# Patient Record
Sex: MALE | Race: WHITE | NOT HISPANIC OR LATINO | Employment: FULL TIME | URBAN - METROPOLITAN AREA
[De-identification: names, ages, dates, MRNs, and addresses within clinical notes are randomized per-mention and may not be internally consistent; named-entity substitution may affect disease eponyms.]

---

## 2017-04-14 ENCOUNTER — ALLSCRIPTS OFFICE VISIT (OUTPATIENT)
Dept: OTHER | Facility: OTHER | Age: 47
End: 2017-04-14

## 2017-04-17 DIAGNOSIS — J30.9 ALLERGIC RHINITIS: ICD-10-CM

## 2017-04-17 DIAGNOSIS — S29.019A STRAIN OF MUSCLE AND TENDON OF UNSPECIFIED WALL OF THORAX, INITIAL ENCOUNTER: ICD-10-CM

## 2017-04-17 DIAGNOSIS — M79.671 PAIN OF RIGHT FOOT: ICD-10-CM

## 2017-04-17 DIAGNOSIS — R07.9 CHEST PAIN: ICD-10-CM

## 2017-04-17 DIAGNOSIS — D47.3 ESSENTIAL HEMORRHAGIC THROMBOCYTHEMIA (HCC): ICD-10-CM

## 2017-04-17 DIAGNOSIS — K21.9 GASTRO-ESOPHAGEAL REFLUX DISEASE WITHOUT ESOPHAGITIS: ICD-10-CM

## 2017-04-17 DIAGNOSIS — M79.10 MYALGIA: ICD-10-CM

## 2017-04-17 DIAGNOSIS — K22.70 BARRETT'S ESOPHAGUS WITHOUT DYSPLASIA: ICD-10-CM

## 2017-04-17 DIAGNOSIS — E78.5 HYPERLIPIDEMIA: ICD-10-CM

## 2017-04-17 DIAGNOSIS — B00.9 HERPESVIRAL INFECTION: ICD-10-CM

## 2017-04-20 ENCOUNTER — TRANSCRIBE ORDERS (OUTPATIENT)
Dept: LAB | Facility: CLINIC | Age: 47
End: 2017-04-20

## 2017-04-20 ENCOUNTER — APPOINTMENT (OUTPATIENT)
Dept: LAB | Facility: CLINIC | Age: 47
End: 2017-04-20
Payer: COMMERCIAL

## 2017-04-20 DIAGNOSIS — E78.5 HYPERLIPIDEMIA: ICD-10-CM

## 2017-04-20 DIAGNOSIS — S29.019A STRAIN OF MUSCLE AND TENDON OF UNSPECIFIED WALL OF THORAX, INITIAL ENCOUNTER: ICD-10-CM

## 2017-04-20 DIAGNOSIS — D47.3 ESSENTIAL HEMORRHAGIC THROMBOCYTHEMIA (HCC): ICD-10-CM

## 2017-04-20 DIAGNOSIS — K22.70 BARRETT'S ESOPHAGUS WITHOUT DYSPLASIA: ICD-10-CM

## 2017-04-20 DIAGNOSIS — B00.9 HERPESVIRAL INFECTION: ICD-10-CM

## 2017-04-20 DIAGNOSIS — J30.9 ALLERGIC RHINITIS: ICD-10-CM

## 2017-04-20 DIAGNOSIS — K21.9 GASTRO-ESOPHAGEAL REFLUX DISEASE WITHOUT ESOPHAGITIS: ICD-10-CM

## 2017-04-20 LAB
ALBUMIN SERPL BCP-MCNC: 3.7 G/DL (ref 3.5–5)
ALP SERPL-CCNC: 87 U/L (ref 46–116)
ALT SERPL W P-5'-P-CCNC: 35 U/L (ref 12–78)
ANION GAP SERPL CALCULATED.3IONS-SCNC: 8 MMOL/L (ref 4–13)
AST SERPL W P-5'-P-CCNC: 21 U/L (ref 5–45)
BILIRUB SERPL-MCNC: 0.7 MG/DL (ref 0.2–1)
BILIRUB UR QL STRIP: NEGATIVE
BUN SERPL-MCNC: 12 MG/DL (ref 5–25)
CALCIUM SERPL-MCNC: 8.9 MG/DL (ref 8.3–10.1)
CHLORIDE SERPL-SCNC: 104 MMOL/L (ref 100–108)
CHOLEST SERPL-MCNC: 260 MG/DL (ref 50–200)
CLARITY UR: CLEAR
CO2 SERPL-SCNC: 27 MMOL/L (ref 21–32)
COLOR UR: YELLOW
CREAT SERPL-MCNC: 1.05 MG/DL (ref 0.6–1.3)
ERYTHROCYTE [DISTWIDTH] IN BLOOD BY AUTOMATED COUNT: 12.1 % (ref 11.6–15.1)
GFR SERPL CREATININE-BSD FRML MDRD: >60 ML/MIN/1.73SQ M
GLUCOSE P FAST SERPL-MCNC: 95 MG/DL (ref 65–99)
GLUCOSE UR STRIP-MCNC: NEGATIVE MG/DL
HCT VFR BLD AUTO: 43 % (ref 36.5–49.3)
HDLC SERPL-MCNC: 51 MG/DL (ref 40–60)
HGB BLD-MCNC: 14.7 G/DL (ref 12–17)
HGB UR QL STRIP.AUTO: NEGATIVE
KETONES UR STRIP-MCNC: NEGATIVE MG/DL
LDLC SERPL CALC-MCNC: 176 MG/DL (ref 0–100)
LEUKOCYTE ESTERASE UR QL STRIP: NEGATIVE
MCH RBC QN AUTO: 33.9 PG (ref 26.8–34.3)
MCHC RBC AUTO-ENTMCNC: 34.2 G/DL (ref 31.4–37.4)
MCV RBC AUTO: 99 FL (ref 82–98)
NITRITE UR QL STRIP: NEGATIVE
PH UR STRIP.AUTO: 6.5 [PH] (ref 4.5–8)
PLATELET # BLD AUTO: 438 THOUSANDS/UL (ref 149–390)
PMV BLD AUTO: 10.5 FL (ref 8.9–12.7)
POTASSIUM SERPL-SCNC: 3.7 MMOL/L (ref 3.5–5.3)
PROT SERPL-MCNC: 7.6 G/DL (ref 6.4–8.2)
PROT UR STRIP-MCNC: NEGATIVE MG/DL
RBC # BLD AUTO: 4.34 MILLION/UL (ref 3.88–5.62)
SODIUM SERPL-SCNC: 139 MMOL/L (ref 136–145)
SP GR UR STRIP.AUTO: 1.02 (ref 1–1.03)
TRIGL SERPL-MCNC: 163 MG/DL
TSH SERPL DL<=0.05 MIU/L-ACNC: 2.02 UIU/ML (ref 0.36–3.74)
UROBILINOGEN UR QL STRIP.AUTO: 0.2 E.U./DL
WBC # BLD AUTO: 7.08 THOUSAND/UL (ref 4.31–10.16)

## 2017-04-20 PROCEDURE — 85027 COMPLETE CBC AUTOMATED: CPT

## 2017-04-20 PROCEDURE — 80061 LIPID PANEL: CPT

## 2017-04-20 PROCEDURE — 84443 ASSAY THYROID STIM HORMONE: CPT

## 2017-04-20 PROCEDURE — 81003 URINALYSIS AUTO W/O SCOPE: CPT

## 2017-04-20 PROCEDURE — 80053 COMPREHEN METABOLIC PANEL: CPT

## 2017-04-20 PROCEDURE — 36415 COLL VENOUS BLD VENIPUNCTURE: CPT

## 2017-05-05 ENCOUNTER — APPOINTMENT (OUTPATIENT)
Dept: LAB | Facility: CLINIC | Age: 47
End: 2017-05-05
Payer: COMMERCIAL

## 2017-05-05 ENCOUNTER — ALLSCRIPTS OFFICE VISIT (OUTPATIENT)
Dept: OTHER | Facility: OTHER | Age: 47
End: 2017-05-05

## 2017-05-05 DIAGNOSIS — M79.10 MYALGIA: ICD-10-CM

## 2017-05-05 DIAGNOSIS — S29.019A STRAIN OF MUSCLE AND TENDON OF UNSPECIFIED WALL OF THORAX, INITIAL ENCOUNTER: ICD-10-CM

## 2017-05-05 DIAGNOSIS — M79.671 PAIN OF RIGHT FOOT: ICD-10-CM

## 2017-05-05 LAB
25(OH)D3 SERPL-MCNC: 13.2 NG/ML (ref 30–100)
ERYTHROCYTE [SEDIMENTATION RATE] IN BLOOD: 7 MM/HOUR (ref 0–10)
URATE SERPL-MCNC: 7.6 MG/DL (ref 4.2–8)

## 2017-05-05 PROCEDURE — 86431 RHEUMATOID FACTOR QUANT: CPT

## 2017-05-05 PROCEDURE — 86430 RHEUMATOID FACTOR TEST QUAL: CPT

## 2017-05-05 PROCEDURE — 82306 VITAMIN D 25 HYDROXY: CPT

## 2017-05-05 PROCEDURE — 36415 COLL VENOUS BLD VENIPUNCTURE: CPT

## 2017-05-05 PROCEDURE — 86038 ANTINUCLEAR ANTIBODIES: CPT

## 2017-05-05 PROCEDURE — 84550 ASSAY OF BLOOD/URIC ACID: CPT

## 2017-05-05 PROCEDURE — 85652 RBC SED RATE AUTOMATED: CPT

## 2017-05-05 PROCEDURE — 86618 LYME DISEASE ANTIBODY: CPT

## 2017-05-06 ENCOUNTER — GENERIC CONVERSION - ENCOUNTER (OUTPATIENT)
Dept: OTHER | Facility: OTHER | Age: 47
End: 2017-05-06

## 2017-05-08 ENCOUNTER — GENERIC CONVERSION - ENCOUNTER (OUTPATIENT)
Dept: OTHER | Facility: OTHER | Age: 47
End: 2017-05-08

## 2017-05-08 LAB
B BURGDOR IGG SER IA-ACNC: 0.39
B BURGDOR IGM SER IA-ACNC: 0.25
CRYOGLOB RF SER-ACNC: ABNORMAL [IU]/ML
RHEUMATOID FACT SER QL LA: POSITIVE
RYE IGE QN: NEGATIVE

## 2017-05-11 ENCOUNTER — HOSPITAL ENCOUNTER (OUTPATIENT)
Dept: RADIOLOGY | Facility: MEDICAL CENTER | Age: 47
Discharge: HOME | End: 2017-05-11
Payer: COMMERCIAL

## 2017-05-11 ENCOUNTER — TRANSCRIBE ORDERS (OUTPATIENT)
Dept: ADMINISTRATIVE | Facility: HOSPITAL | Age: 47
End: 2017-05-11

## 2017-05-11 DIAGNOSIS — S29.019A STRAIN OF MUSCLE AND TENDON OF UNSPECIFIED WALL OF THORAX, INITIAL ENCOUNTER: ICD-10-CM

## 2017-05-11 DIAGNOSIS — M79.10 MYALGIA: ICD-10-CM

## 2017-05-11 DIAGNOSIS — M79.671 PAIN OF RIGHT FOOT: ICD-10-CM

## 2017-05-11 PROCEDURE — 72072 X-RAY EXAM THORAC SPINE 3VWS: CPT

## 2017-05-12 ENCOUNTER — HOSPITAL ENCOUNTER (OUTPATIENT)
Dept: RADIOLOGY | Facility: MEDICAL CENTER | Age: 47
Discharge: HOME | End: 2017-05-12
Payer: COMMERCIAL

## 2017-05-12 ENCOUNTER — ALLSCRIPTS OFFICE VISIT (OUTPATIENT)
Dept: OTHER | Facility: OTHER | Age: 47
End: 2017-05-12

## 2017-05-12 DIAGNOSIS — R07.9 CHEST PAIN: ICD-10-CM

## 2017-05-12 DIAGNOSIS — S29.019A STRAIN OF MUSCLE AND TENDON OF UNSPECIFIED WALL OF THORAX, INITIAL ENCOUNTER: ICD-10-CM

## 2017-05-12 PROCEDURE — 71101 X-RAY EXAM UNILAT RIBS/CHEST: CPT

## 2017-05-14 ENCOUNTER — GENERIC CONVERSION - ENCOUNTER (OUTPATIENT)
Dept: OTHER | Facility: OTHER | Age: 47
End: 2017-05-14

## 2017-05-29 ENCOUNTER — OFFICE VISIT (OUTPATIENT)
Dept: URGENT CARE | Facility: MEDICAL CENTER | Age: 47
End: 2017-05-29
Payer: COMMERCIAL

## 2017-05-29 ENCOUNTER — HOSPITAL ENCOUNTER (OUTPATIENT)
Dept: RADIOLOGY | Facility: MEDICAL CENTER | Age: 47
Discharge: HOME | End: 2017-05-29
Admitting: FAMILY MEDICINE
Payer: COMMERCIAL

## 2017-05-29 DIAGNOSIS — M79.10 MYALGIA: ICD-10-CM

## 2017-05-29 DIAGNOSIS — B00.9 HERPESVIRAL INFECTION: ICD-10-CM

## 2017-05-29 DIAGNOSIS — E55.9 VITAMIN D DEFICIENCY: ICD-10-CM

## 2017-05-29 DIAGNOSIS — M25.531 PAIN IN RIGHT WRIST: ICD-10-CM

## 2017-05-29 DIAGNOSIS — E78.5 HYPERLIPIDEMIA: ICD-10-CM

## 2017-05-29 DIAGNOSIS — S69.91XA WRIST INJURY, RIGHT, INITIAL ENCOUNTER: ICD-10-CM

## 2017-05-29 DIAGNOSIS — R53.83 OTHER FATIGUE: ICD-10-CM

## 2017-05-29 DIAGNOSIS — R76.8 OTHER SPECIFIED ABNORMAL IMMUNOLOGICAL FINDINGS IN SERUM: ICD-10-CM

## 2017-05-29 PROCEDURE — 99203 OFFICE O/P NEW LOW 30 MIN: CPT

## 2017-05-29 PROCEDURE — 73110 X-RAY EXAM OF WRIST: CPT

## 2017-05-29 PROCEDURE — 29260 STRAPPING OF ELBOW OR WRIST: CPT

## 2017-06-09 ENCOUNTER — ALLSCRIPTS OFFICE VISIT (OUTPATIENT)
Dept: OTHER | Facility: OTHER | Age: 47
End: 2017-06-09

## 2017-08-05 DIAGNOSIS — D47.3 ESSENTIAL HEMORRHAGIC THROMBOCYTHEMIA (HCC): ICD-10-CM

## 2017-08-05 DIAGNOSIS — K22.70 BARRETT'S ESOPHAGUS WITHOUT DYSPLASIA: ICD-10-CM

## 2017-08-05 DIAGNOSIS — R76.8 OTHER SPECIFIED ABNORMAL IMMUNOLOGICAL FINDINGS IN SERUM: ICD-10-CM

## 2017-08-05 DIAGNOSIS — R53.83 OTHER FATIGUE: ICD-10-CM

## 2017-08-05 DIAGNOSIS — Z77.21 CONTACT WITH AND (SUSPECTED) EXPOSURE TO POTENTIALLY HAZARDOUS BODY FLUIDS: ICD-10-CM

## 2017-08-05 DIAGNOSIS — K21.9 GASTRO-ESOPHAGEAL REFLUX DISEASE WITHOUT ESOPHAGITIS: ICD-10-CM

## 2017-08-05 DIAGNOSIS — G47.19 OTHER HYPERSOMNIA: ICD-10-CM

## 2017-08-05 DIAGNOSIS — R31.9 HEMATURIA: ICD-10-CM

## 2017-08-05 DIAGNOSIS — M79.671 PAIN OF RIGHT FOOT: ICD-10-CM

## 2017-08-05 DIAGNOSIS — M79.10 MYALGIA: ICD-10-CM

## 2017-08-05 DIAGNOSIS — E55.9 VITAMIN D DEFICIENCY: ICD-10-CM

## 2017-08-05 DIAGNOSIS — E78.5 HYPERLIPIDEMIA: ICD-10-CM

## 2017-08-05 DIAGNOSIS — S29.019A STRAIN OF MUSCLE AND TENDON OF UNSPECIFIED WALL OF THORAX, INITIAL ENCOUNTER: ICD-10-CM

## 2017-08-15 ENCOUNTER — ALLSCRIPTS OFFICE VISIT (OUTPATIENT)
Dept: OTHER | Facility: OTHER | Age: 47
End: 2017-08-15

## 2017-08-15 ENCOUNTER — TRANSCRIBE ORDERS (OUTPATIENT)
Dept: LAB | Facility: CLINIC | Age: 47
End: 2017-08-15

## 2017-08-15 ENCOUNTER — APPOINTMENT (OUTPATIENT)
Dept: LAB | Facility: CLINIC | Age: 47
End: 2017-08-15
Payer: COMMERCIAL

## 2017-08-15 DIAGNOSIS — G47.19 OTHER HYPERSOMNIA: ICD-10-CM

## 2017-08-15 DIAGNOSIS — D47.3 ESSENTIAL HEMORRHAGIC THROMBOCYTHEMIA (HCC): ICD-10-CM

## 2017-08-15 DIAGNOSIS — Z77.21 CONTACT WITH AND (SUSPECTED) EXPOSURE TO POTENTIALLY HAZARDOUS BODY FLUIDS: ICD-10-CM

## 2017-08-15 DIAGNOSIS — E55.9 VITAMIN D DEFICIENCY: ICD-10-CM

## 2017-08-15 DIAGNOSIS — R76.8 OTHER SPECIFIED ABNORMAL IMMUNOLOGICAL FINDINGS IN SERUM: ICD-10-CM

## 2017-08-15 DIAGNOSIS — R53.83 OTHER FATIGUE: ICD-10-CM

## 2017-08-15 DIAGNOSIS — Z77.21 PERSONAL HISTORY OF EXPOSURE TO POTENTIALLY HAZARDOUS BODY FLUIDS: Primary | ICD-10-CM

## 2017-08-15 DIAGNOSIS — M79.10 MYALGIA: ICD-10-CM

## 2017-08-15 DIAGNOSIS — E78.5 HYPERLIPIDEMIA: ICD-10-CM

## 2017-08-15 LAB
25(OH)D3 SERPL-MCNC: 32.8 NG/ML (ref 30–100)
ALBUMIN SERPL BCP-MCNC: 3.6 G/DL (ref 3.5–5)
ALP SERPL-CCNC: 99 U/L (ref 46–116)
ALT SERPL W P-5'-P-CCNC: 36 U/L (ref 12–78)
ANION GAP SERPL CALCULATED.3IONS-SCNC: 8 MMOL/L (ref 4–13)
AST SERPL W P-5'-P-CCNC: 20 U/L (ref 5–45)
BACTERIA UR QL AUTO: ABNORMAL /HPF
BILIRUB SERPL-MCNC: 0.41 MG/DL (ref 0.2–1)
BILIRUB UR QL STRIP: NEGATIVE
BUN SERPL-MCNC: 10 MG/DL (ref 5–25)
CALCIUM SERPL-MCNC: 9.1 MG/DL (ref 8.3–10.1)
CHLORIDE SERPL-SCNC: 106 MMOL/L (ref 100–108)
CK SERPL-CCNC: 74 U/L (ref 39–308)
CLARITY UR: CLEAR
CO2 SERPL-SCNC: 27 MMOL/L (ref 21–32)
COLOR UR: YELLOW
CREAT SERPL-MCNC: 0.95 MG/DL (ref 0.6–1.3)
ERYTHROCYTE [DISTWIDTH] IN BLOOD BY AUTOMATED COUNT: 12 % (ref 11.6–15.1)
GFR SERPL CREATININE-BSD FRML MDRD: 96 ML/MIN/1.73SQ M
GLUCOSE P FAST SERPL-MCNC: 143 MG/DL (ref 65–99)
GLUCOSE UR STRIP-MCNC: NEGATIVE MG/DL
HCT VFR BLD AUTO: 44.6 % (ref 36.5–49.3)
HGB BLD-MCNC: 15.6 G/DL (ref 12–17)
HGB UR QL STRIP.AUTO: ABNORMAL
HYALINE CASTS #/AREA URNS LPF: ABNORMAL /LPF
KETONES UR STRIP-MCNC: NEGATIVE MG/DL
LEUKOCYTE ESTERASE UR QL STRIP: NEGATIVE
MCH RBC QN AUTO: 33.5 PG (ref 26.8–34.3)
MCHC RBC AUTO-ENTMCNC: 35 G/DL (ref 31.4–37.4)
MCV RBC AUTO: 96 FL (ref 82–98)
NITRITE UR QL STRIP: NEGATIVE
NON-SQ EPI CELLS URNS QL MICRO: ABNORMAL /HPF
PH UR STRIP.AUTO: 6 [PH] (ref 4.5–8)
PLATELET # BLD AUTO: 469 THOUSANDS/UL (ref 149–390)
PMV BLD AUTO: 9.9 FL (ref 8.9–12.7)
POTASSIUM SERPL-SCNC: 4 MMOL/L (ref 3.5–5.3)
PROT SERPL-MCNC: 7.6 G/DL (ref 6.4–8.2)
PROT UR STRIP-MCNC: ABNORMAL MG/DL
RBC # BLD AUTO: 4.66 MILLION/UL (ref 3.88–5.62)
RBC #/AREA URNS AUTO: ABNORMAL /HPF
SODIUM SERPL-SCNC: 141 MMOL/L (ref 136–145)
SP GR UR STRIP.AUTO: 1.01 (ref 1–1.03)
TSH SERPL DL<=0.05 MIU/L-ACNC: 1.3 UIU/ML (ref 0.36–3.74)
UROBILINOGEN UR QL STRIP.AUTO: 0.2 E.U./DL
WBC # BLD AUTO: 5.34 THOUSAND/UL (ref 4.31–10.16)
WBC #/AREA URNS AUTO: ABNORMAL /HPF

## 2017-08-15 PROCEDURE — 86696 HERPES SIMPLEX TYPE 2 TEST: CPT

## 2017-08-15 PROCEDURE — 80074 ACUTE HEPATITIS PANEL: CPT

## 2017-08-15 PROCEDURE — 82550 ASSAY OF CK (CPK): CPT

## 2017-08-15 PROCEDURE — 84443 ASSAY THYROID STIM HORMONE: CPT

## 2017-08-15 PROCEDURE — 87591 N.GONORRHOEAE DNA AMP PROB: CPT

## 2017-08-15 PROCEDURE — 86592 SYPHILIS TEST NON-TREP QUAL: CPT

## 2017-08-15 PROCEDURE — 36415 COLL VENOUS BLD VENIPUNCTURE: CPT

## 2017-08-15 PROCEDURE — 80053 COMPREHEN METABOLIC PANEL: CPT

## 2017-08-15 PROCEDURE — 87389 HIV-1 AG W/HIV-1&-2 AB AG IA: CPT

## 2017-08-15 PROCEDURE — 81001 URINALYSIS AUTO W/SCOPE: CPT

## 2017-08-15 PROCEDURE — 86695 HERPES SIMPLEX TYPE 1 TEST: CPT

## 2017-08-15 PROCEDURE — 86694 HERPES SIMPLEX NES ANTBDY: CPT

## 2017-08-15 PROCEDURE — 82306 VITAMIN D 25 HYDROXY: CPT

## 2017-08-15 PROCEDURE — 85027 COMPLETE CBC AUTOMATED: CPT

## 2017-08-15 PROCEDURE — 87491 CHLMYD TRACH DNA AMP PROBE: CPT

## 2017-08-16 ENCOUNTER — GENERIC CONVERSION - ENCOUNTER (OUTPATIENT)
Dept: OTHER | Facility: OTHER | Age: 47
End: 2017-08-16

## 2017-08-16 ENCOUNTER — TRANSCRIBE ORDERS (OUTPATIENT)
Dept: ADMINISTRATIVE | Facility: HOSPITAL | Age: 47
End: 2017-08-16

## 2017-08-16 DIAGNOSIS — G47.19 EXCESSIVE DAYTIME SLEEPINESS: Primary | ICD-10-CM

## 2017-08-16 LAB
HAV IGM SER QL: NORMAL
HBV CORE IGM SER QL: NORMAL
HBV SURFACE AG SER QL: NORMAL
HCV AB SER QL: NORMAL
HIV 1+2 AB+HIV1 P24 AG SERPL QL IA: NORMAL
HSV1 IGG SER IA-ACNC: <0.91 INDEX (ref 0–0.9)
HSV2 IGG SER IA-ACNC: <0.91 INDEX (ref 0–0.9)
RPR SER QL: NORMAL

## 2017-08-17 ENCOUNTER — TRANSCRIBE ORDERS (OUTPATIENT)
Dept: ADMINISTRATIVE | Facility: HOSPITAL | Age: 47
End: 2017-08-17

## 2017-08-17 ENCOUNTER — GENERIC CONVERSION - ENCOUNTER (OUTPATIENT)
Dept: OTHER | Facility: OTHER | Age: 47
End: 2017-08-17

## 2017-08-17 DIAGNOSIS — R31.9 HEMATURIA: Primary | ICD-10-CM

## 2017-08-17 LAB
CHLAMYDIA DNA CVX QL NAA+PROBE: NORMAL
N GONORRHOEA DNA GENITAL QL NAA+PROBE: NORMAL

## 2017-08-18 ENCOUNTER — GENERIC CONVERSION - ENCOUNTER (OUTPATIENT)
Dept: OTHER | Facility: OTHER | Age: 47
End: 2017-08-18

## 2017-08-18 LAB — HSV1+2 IGM SER IA-ACNC: 1.25 RATIO (ref 0–0.9)

## 2017-08-21 ENCOUNTER — HOSPITAL ENCOUNTER (OUTPATIENT)
Dept: ULTRASOUND IMAGING | Facility: HOSPITAL | Age: 47
Discharge: HOME | End: 2017-08-21
Payer: COMMERCIAL

## 2017-08-21 DIAGNOSIS — R31.9 HEMATURIA: ICD-10-CM

## 2017-08-21 PROCEDURE — 76770 US EXAM ABDO BACK WALL COMP: CPT

## 2017-09-05 ENCOUNTER — GENERIC CONVERSION - ENCOUNTER (OUTPATIENT)
Dept: OTHER | Facility: OTHER | Age: 47
End: 2017-09-05

## 2017-09-05 DIAGNOSIS — E78.5 HYPERLIPIDEMIA: ICD-10-CM

## 2017-09-05 DIAGNOSIS — M79.671 PAIN OF RIGHT FOOT: ICD-10-CM

## 2017-09-05 DIAGNOSIS — R31.9 HEMATURIA: ICD-10-CM

## 2017-09-23 ENCOUNTER — GENERIC CONVERSION - ENCOUNTER (OUTPATIENT)
Dept: OTHER | Facility: OTHER | Age: 47
End: 2017-09-23

## 2017-09-23 ENCOUNTER — LAB CONVERSION - ENCOUNTER (OUTPATIENT)
Dept: OTHER | Facility: OTHER | Age: 47
End: 2017-09-23

## 2017-09-23 LAB
A/G RATIO (HISTORICAL): 1.4 (CALC) (ref 1–2.5)
ALBUMIN SERPL BCP-MCNC: 4 G/DL (ref 3.6–5.1)
ALP SERPL-CCNC: 83 U/L (ref 40–115)
ALT SERPL W P-5'-P-CCNC: 36 U/L (ref 9–46)
AST SERPL W P-5'-P-CCNC: 26 U/L (ref 10–40)
BACTERIA UR QL AUTO: ABNORMAL /HPF
BILIRUB SERPL-MCNC: 0.4 MG/DL (ref 0.2–1.2)
BILIRUB UR QL STRIP: NEGATIVE
BUN SERPL-MCNC: 11 MG/DL (ref 7–25)
BUN/CREA RATIO (HISTORICAL): ABNORMAL (CALC) (ref 6–22)
CALCIUM SERPL-MCNC: 9.8 MG/DL (ref 8.6–10.3)
CHLORIDE SERPL-SCNC: 103 MMOL/L (ref 98–110)
CHOLEST SERPL-MCNC: 174 MG/DL
CHOLEST/HDLC SERPL: 3.7 (CALC)
CO2 SERPL-SCNC: 29 MMOL/L (ref 20–31)
COLOR UR: YELLOW
COMMENT (HISTORICAL): CLEAR
CREAT SERPL-MCNC: 0.99 MG/DL (ref 0.6–1.35)
DEPRECATED RDW RBC AUTO: 11.7 % (ref 11–15)
EGFR AFRICAN AMERICAN (HISTORICAL): 105 ML/MIN/1.73M2
EGFR-AMERICAN CALC (HISTORICAL): 90 ML/MIN/1.73M2
FECAL OCCULT BLOOD DIAGNOSTIC (HISTORICAL): NEGATIVE
GAMMA GLOBULIN (HISTORICAL): 2.8 G/DL (CALC) (ref 1.9–3.7)
GLUCOSE (HISTORICAL): 102 MG/DL (ref 65–99)
GLUCOSE (HISTORICAL): NEGATIVE
HCT VFR BLD AUTO: 42.5 % (ref 38.5–50)
HDLC SERPL-MCNC: 47 MG/DL
HGB BLD-MCNC: 14.5 G/DL (ref 13.2–17.1)
HYALINE CASTS #/AREA URNS LPF: ABNORMAL /LPF
KETONES UR STRIP-MCNC: NEGATIVE MG/DL
LDL CHOLESTEROL (HISTORICAL): 100 MG/DL (CALC)
LEUKOCYTE ESTERASE UR QL STRIP: NEGATIVE
MCH RBC QN AUTO: 33.1 PG (ref 27–33)
MCHC RBC AUTO-ENTMCNC: 34.1 G/DL (ref 32–36)
MCV RBC AUTO: 97 FL (ref 80–100)
NITRITE UR QL STRIP: NEGATIVE
NON-HDL-CHOL (CHOL-HDL) (HISTORICAL): 127 MG/DL (CALC)
PH UR STRIP.AUTO: 6.5 [PH] (ref 5–8)
PLATELET # BLD AUTO: 429 THOUSAND/UL (ref 140–400)
PMV BLD AUTO: 9.9 FL (ref 7.5–12.5)
POTASSIUM SERPL-SCNC: 4.8 MMOL/L (ref 3.5–5.3)
PROT UR STRIP-MCNC: ABNORMAL MG/DL
RBC # BLD AUTO: 4.38 MILLION/UL (ref 4.2–5.8)
RBC (HISTORICAL): ABNORMAL /HPF
SODIUM SERPL-SCNC: 139 MMOL/L (ref 135–146)
SP GR UR STRIP.AUTO: 1.02 (ref 1–1.03)
SQUAMOUS EPITHELIAL CELLS (HISTORICAL): ABNORMAL /HPF
TOTAL PROTEIN (HISTORICAL): 6.8 G/DL (ref 6.1–8.1)
TRIGL SERPL-MCNC: 173 MG/DL
TSH SERPL DL<=0.05 MIU/L-ACNC: 2.25 MIU/L (ref 0.4–4.5)
WBC # BLD AUTO: 7 THOUSAND/UL (ref 3.8–10.8)
WBC # BLD AUTO: ABNORMAL /HPF

## 2017-09-29 ENCOUNTER — GENERIC CONVERSION - ENCOUNTER (OUTPATIENT)
Dept: OTHER | Facility: OTHER | Age: 47
End: 2017-09-29

## 2017-09-30 ENCOUNTER — OFFICE VISIT (OUTPATIENT)
Dept: URGENT CARE | Facility: MEDICAL CENTER | Age: 47
End: 2017-09-30
Payer: COMMERCIAL

## 2017-09-30 ENCOUNTER — APPOINTMENT (OUTPATIENT)
Dept: RADIOLOGY | Facility: MEDICAL CENTER | Age: 47
End: 2017-09-30
Payer: COMMERCIAL

## 2017-09-30 DIAGNOSIS — M79.671 PAIN OF RIGHT FOOT: ICD-10-CM

## 2017-09-30 PROCEDURE — 73630 X-RAY EXAM OF FOOT: CPT

## 2017-09-30 PROCEDURE — G0382 LEV 3 HOSP TYPE B ED VISIT: HCPCS

## 2017-09-30 PROCEDURE — 99283 EMERGENCY DEPT VISIT LOW MDM: CPT

## 2017-10-07 NOTE — PROGRESS NOTES
Assessment  1  Right foot pain (729 5) (C18 795)    Discussion/Summary  Discussion Summary:   1  Right foot painshows no acute abnormality however if pain persists, radiologist recommends having an MRI of your foot performedTylenol/ibuprofen as needed for painup with your podiatrist for re-evaluation within 1 week  to ER with worsening symptoms, fever, worsening pain, redness or any new concerning symptoms  Understands and agrees with treatment plan: The treatment plan was reviewed with the patient/guardian  The patient/guardian understands and agrees with the treatment plan      Chief Complaint  1  Foot Problem  Chief Complaint Free Text Note Form: Pt with complaints of pain and swelling right foot for 2 days  States area of pain is dorsum and planter surface of foot below toes  States has had dull pain for 3 months but worse past 2 days with weight bearing and bending of toes  History of Present Illness  HPI: Patient presents today for right foot pain  Saw PCP for this about 3 months ago and they did see anything abnormal  However he was supposed to have x-ray and start PT however did not do either  Past couple of day, the patient has had increased pain and swelling  The patient states that he is having trouble walking  He rates his pain 7/10  Patient has not taken any meds PTA  No injury or trauma  Hospital Based Practices Required Assessment:   Pain Assessment   the patient states they have pain  The pain is located in the right foot  The patient describes the pain as aching  (on a scale of 0 to 10, the patient rates the pain at 7 )    Prefered Language is  english  Primary Language is  english  Review of Systems  Focused-Male:   Constitutional: no fever-and-no chills  Musculoskeletal: joint swelling  Integumentary: no skin wound  Neurological: no numbness-and-no tingling  ROS Reviewed:   ROS reviewed  Active Problems  1  Allergic rhinitis (477 9) (J30 9)   2   Anxiety (300 00) Ordered  Medication List Reviewed: The medication list was reviewed and updated today  Allergies  1  Zithromax TABS   2  Penicillins    Vitals  Signs   Recorded: [id number] 09:09AM   Temperature: 97 8 F  Heart Rate: 68  Respiration: 16  Systolic: 007  Diastolic: 82  O2 Saturation: 100  Pain Scale: 7    Physical Exam    Constitutional   General appearance: No acute distress, well appearing and well nourished  Pulmonary   Respiratory effort: No increased work of breathing or signs of respiratory distress  Auscultation of lungs: Clear to auscultation  Cardiovascular   Auscultation of heart: Normal rate and rhythm, normal S1 and S2, without murmurs  Musculoskeletal   Inspection/palpation of joints, bones, and muscles: Abnormal  -Mild swelling of the plantar aspect of the foot near the 2nd and 3rd MTP joints  There is tenderness upon palpation of the distal plantar aspect of the foot near the 2nd-4th MTP joints  Great toe is NTTP  2+ DP pulse with brisk cap refill  Skin   Skin and subcutaneous tissue: Normal without rashes or lesions  -No erythema or rash  Neurologic   Sensation: No sensory loss  Psychiatric   Orientation to person, place and time: Normal        Results/Data  * XR FOOT 3+ VIEW RIGHT [id number] 09:35AM Althea Ritchie Order Number: [id number]     Test Name Result Flag Reference   XR FOOT 3+ VW RIGHT (Report)     RIGHT FOOT     INDICATION: Right foot pain  Right foot pain over the past couple days along the distal dorsal and plantar surface of the 2nd metatarsal area  COMPARISON: None     VIEWS: 3     IMAGES: 3     FINDINGS:     There is no acute fracture or dislocation  There is a thin sliver of increased density at the base of the proximal phalanx of the 2nd digit on the oblique only  This likely represents superimposed soft tissue as there is no evidence of fracture  No degenerative changes  Os trigonum and type II accessory navicular bone are present       No lytic or blastic lesions are seen  Soft tissues are unremarkable  IMPRESSION:     No acute osseous abnormality  Consider MRI         Workstation performed: [id number]     Signed by:   Tim Dean MD   9/30/17                               Signatures   Electronically signed by : Sedrick Epstein [facility]; Sep 30 2017 10:41AM EST                       (Author)    Electronically signed by : ANNABEL Escobar ; Oct  6 2017  9:24PM EST                       (Co-author)

## 2017-10-31 ENCOUNTER — GENERIC CONVERSION - ENCOUNTER (OUTPATIENT)
Dept: OTHER | Facility: OTHER | Age: 47
End: 2017-10-31

## 2017-12-12 ENCOUNTER — GENERIC CONVERSION - ENCOUNTER (OUTPATIENT)
Dept: FAMILY MEDICINE CLINIC | Facility: CLINIC | Age: 47
End: 2017-12-12

## 2018-01-10 NOTE — RESULT NOTES
Verified Results  (1) COMPREHENSIVE METABOLIC PANEL [id number] 10:04EL Chrissy Regalado Order Number: [id number]     Test Name Result Flag Reference   GLUCOSE,RANDM 90 mg/dL     If the patient is fasting, the ADA then defines impaired fasting glucose as > 100 mg/dL and diabetes as > or equal to 123 mg/dL  SODIUM 138 mmol/L  136-145   POTASSIUM 4 3 mmol/L  3 5-5 3   CHLORIDE 104 mmol/L  100-108   CARBON DIOXIDE 29 mmol/L  21-32   ANION GAP (CALC) 5 mmol/L  4-13   BLOOD UREA NITROGEN 10 mg/dL  5-25   CREATININE 1 00 mg/dL  0 60-1 30   Standardized to IDMS reference method   CALCIUM 9 4 mg/dL  8 3-10 1   BILI, TOTAL 0 50 mg/dL  0 20-1 00   ALK PHOSPHATAS 87 U/L     ALT (SGPT) 32 U/L  12-78   AST(SGOT) 13 U/L  5-45   ALBUMIN 3 6 g/dL  3 5-5 0   TOTAL PROTEIN 7 3 g/dL  6 4-8 2   eGFR Non-African American      >60 0 ml/min/1 73sq [facility] Disease Education Program recommendations are as follows:  GFR calculation is accurate only with a steady state creatinine  Chronic Kidney disease less than 60 ml/min/1 73 sq  meters  Kidney failure less than 15 ml/min/1 73 sq  meters  (1) CBC/ PLT (NO DIFF) [id number] 12:28PM yjoti Kevin Order Number: [id number]     Test Name Result Flag Reference   HEMATOCRIT 43 4 %  36 5-49 3   HEMOGLOBIN 14 9 g/dL  12 0-17 0   MCHC 34 3 g/dL  31 4-37 4   MCH 33 1 pg  26 8-34 3   MCV 96 fL  82-98   PLATELET COUNT 726 Thousands/uL H 149-390   RBC COUNT 4 50 Million/uL  3 88-5 62   RDW 13 0 %  11 6-15 1   WBC COUNT 6 94 Thousand/uL  4 31-10 16   MPV 9 9 fL  8 9-12 7     (1) LIPID PANEL, FASTING [id number] 12:28PM jyoti Kevin Order Number: [id number]     Test Name Result Flag Reference   CHOLESTEROL 256 mg/dL H    HDL,DIRECT 51 mg/dL  40-60   Specimen collection should occur prior to Metamizole administration due to the potential for falsely depressed results     LDL CHOLESTEROL CALCULATED 167 mg/dL H 0-100   Triglyceride: Normal              <150 mg/dl       Borderline High    150-199 mg/dl       High               200-499 mg/dl       Very High          >499 mg/dl  Cholesterol:         Desirable        <200 mg/dl      Borderline High  200-239 mg/dl      High             >239 mg/dl  HDL Cholesterol:        High    >59 mg/dL      Low     <41 mg/dL  LDL CALCULATED:    This screening LDL is a calculated result  It does not have the accuracy of the Direct Measured LDL in the monitoring of patients with hyperlipidemia and/or statin therapy  Direct Measure LDL (PZI272) must be ordered separately in these patients  TRIGLYCERIDES 190 mg/dL H <=150   Specimen collection should occur prior to N-Acetylcysteine or Metamizole administration due to the potential for falsely depressed results  (1) TSH 08Sep2016 12:28PM [employer] Order Number: [id number]     Test Name Result Flag Reference   TSH 1 460 uIU/mL  0 358-3 740   Patients undergoing fluorescein dye angiography may retain small amounts of fluorescein in the body for 48-72 hours post procedure  Samples containing fluorescein can produce falsely depressed TSH values  If the patient had this procedure,a specimen should be resubmitted post fluorescein clearance       (1) URINALYSIS (will reflex a microscopy if leukocytes, occult blood, protein or nitrites are not within normal limits) 08Sep2016 12:28PM [employer] Order Number: [id number]     Test Name Result Flag Reference   COLOR Yellow     CLARITY Clear     SPECIFIC GRAVITY UA 1 012  1 003-1 030   PH UA 7 5  4 5-8 0   LEUKOCYTE ESTERASE UA Negative  Negative   NITRITE UA Negative  Negative   PROTEIN UA Negative mg/dl  Negative   GLUCOSE UA Negative mg/dl  Negative   KETONES UA Negative mg/dl  Negative   UROBILINOGEN UA 0 2 E U /dl  0 2, 1 0 E U /dl   BILIRUBIN UA Negative  Negative   BLOOD UA Negative  Negative

## 2018-01-11 NOTE — RESULT NOTES
Verified Results  (Q) LIPID PANEL WITH REFLEX TO DIRECT LDL [id number] 09:00AM James Escamilla     Test Name Result Flag Reference   CHOLESTEROL, TOTAL 174 mg/dL  <200   HDL CHOLESTEROL 47 mg/dL  >15   TRIGLICERIDES 427 mg/dL H <150   LDL-CHOLESTEROL 100 mg/dL (calc) H    Reference range: <100     Desirable range <100 mg/dL for patients with CHD or  diabetes and <70 mg/dL for diabetic patients with  known heart disease  LDL-C is now calculated using the Hector-Reed   calculation, which is a validated novel method providing   better accuracy than the Friedewald equation in the   estimation of LDL-C  Jessica Jarquin  [facility]  9864;520(43): 4484-8736   (http://[employer]/faq/WBZ780)   CHOL/HDLC RATIO 3 7 (calc)  <5 0   NON HDL CHOLESTEROL 127 mg/dL (calc)  <130   For patients with diabetes plus 1 major ASCVD risk   factor, treating to a non-HDL-C goal of <100 mg/dL   (LDL-C of <70 mg/dL) is considered a therapeutic   option       (Q) CBC (H/H, RBC, INDICES, WBC, PLT) 22Sep2017 09:00AM James Escamilla     Test Name Result Flag Reference   WHITE BLOOD CELL COUNT 7 0 Thousand/uL  3 8-10 8   RED BLOOD CELL COUNT 4 38 Million/uL  4 20-5 80   HEMOGLOBIN 14 5 g/dL  13 2-17 1   HEMATOCRIT 42 5 %  38 5-50 0   MCV 97 0 fL  80 0-100 0   MCH 33 1 pg H 27 0-33 0   MCHC 34 1 g/dL  32 0-36 0   RDW 11 7 %  11 0-15 0   PLATELET COUNT 516 Thousand/uL H 140-400   MPV 9 9 fL  7 5-12 5     (Q) TSH, 3RD GENERATION [id number] 09:00AM James Escamilla     Test Name Result Flag Reference   TSH 2 25 mIU/L  0 40-4 50     (Q) URINALYSIS REFLEX [id number] 09:00AM James Escamilla   REPORT COMMENT:  FASTING:YES     Test Name Result Flag Reference   COLOR YELLOW  YELLOW   APPEARANCE CLEAR  CLEAR   SPECIFIC GRAVITY 1 016  1 001-1 035   PH 6 5  5 0-8 0   GLUCOSE NEGATIVE  NEGATIVE   BILIRUBIN NEGATIVE  NEGATIVE   KETONES NEGATIVE  NEGATIVE   OCCULT BLOOD NEGATIVE  NEGATIVE   PROTEIN TRACE A NEGATIVE   NITRITE NEGATIVE  NEGATIVE   LEUKOCYTE ESTERASE NEGATIVE  NEGATIVE   WBC 0-5 /HPF  < OR = 5   RBC NONE SEEN /HPF  < OR = 2   SQUAMOUS EPITHELIAL CELLS NONE SEEN /HPF  < OR = 5   BACTERIA NONE SEEN /HPF  NONE SEEN   HYALINE CAST NONE SEEN /LPF  NONE SEEN     (1) COMPREHENSIVE METABOLIC PANEL [id number] 78:40JN James Escamilla     Test Name Result Flag Reference   GLUCOSE 102 mg/dL H 65-99   Fasting reference interval     For someone without known diabetes, a glucose value  between 100 and 125 mg/dL is consistent with  prediabetes and should be confirmed with a  follow-up test    UREA NITROGEN (BUN) 11 mg/dL  7-25   CREATININE 0 99 mg/dL  0 60-1 35   eGFR NON-AFR   AMERICAN 90 mL/min/1 73m2  > OR = 60   eGFR AFRICAN AMERICAN 105 mL/min/1 73m2  > OR = 60   BUN/CREATININE RATIO   8-43   NOT APPLICABLE (calc)   SODIUM 139 mmol/L  135-146   POTASSIUM 4 8 mmol/L  3 5-5 3   CHLORIDE 103 mmol/L     CARBON DIOXIDE 29 mmol/L  20-31   CALCIUM 9 8 mg/dL  8 6-10 3   PROTEIN, TOTAL 6 8 g/dL  6 1-8 1   ALBUMIN 4 0 g/dL  3 6-5 1   GLOBULIN 2 8 g/dL (calc)  1 9-3 7   ALBUMIN/GLOBULIN RATIO 1 4 (calc)  1 0-2 5   BILIRUBIN, TOTAL 0 4 mg/dL  0 2-1 2   ALKALINE PHOSPHATASE 83 U/L     AST 26 U/L  10-40   ALT 36 U/L  9-46

## 2018-01-12 VITALS
WEIGHT: 211 LBS | BODY MASS INDEX: 29.54 KG/M2 | HEART RATE: 88 BPM | HEIGHT: 71 IN | DIASTOLIC BLOOD PRESSURE: 78 MMHG | SYSTOLIC BLOOD PRESSURE: 132 MMHG | RESPIRATION RATE: 16 BRPM

## 2018-01-12 VITALS
DIASTOLIC BLOOD PRESSURE: 90 MMHG | BODY MASS INDEX: 29.61 KG/M2 | SYSTOLIC BLOOD PRESSURE: 118 MMHG | RESPIRATION RATE: 16 BRPM | WEIGHT: 211.5 LBS | HEIGHT: 71 IN | HEART RATE: 72 BPM

## 2018-01-12 NOTE — RESULT NOTES
Verified Results  (1) CHLAMYDIA/GC AMPLIFIED DNA, PCR [id number] 02:18PM James Escamilla     Test Name Result Flag Reference   CHLAMYDIA,AMPLIFIED DNA PROBE   C  trachomatis Amplified DNA Negative   C  trachomatis Amplified DNA Negative   For optimal microbe detection, urine samples should be a first catch specimen (20-60 ml of urine)  Patient should not have urinated for at least 1 hour prior to collection  A specimen not collected in this manner may have falsely negative results     N  GONORRHOEAE AMPLIFIED DNA   N  gonorrhoeae Amplified DNA Negative   N  gonorrhoeae Amplified DNA Negative

## 2018-01-13 VITALS
HEART RATE: 80 BPM | DIASTOLIC BLOOD PRESSURE: 84 MMHG | TEMPERATURE: 98.3 F | SYSTOLIC BLOOD PRESSURE: 118 MMHG | BODY MASS INDEX: 29.68 KG/M2 | RESPIRATION RATE: 20 BRPM | WEIGHT: 212 LBS | HEIGHT: 71 IN

## 2018-01-13 NOTE — RESULT NOTES
Verified Results  (1) RHEUMATOID FACTOR SCREEN [id number] 11:31AM Thenquentin Thong Order Number: [id number]     Test Name Result Flag Reference   RHEUMATOID FACTOR Positive A Negative   See RF Quantitation   RF QUANTITATION 10 IU/mL A (none)

## 2018-01-13 NOTE — RESULT NOTES
Verified Results  (Q) LIPID PANEL WITH REFLEX TO DIRECT LDL 22Sep2017 09:00AM James Escamilla     Test Name Result Flag Reference   CHOLESTEROL, TOTAL 174 mg/dL  <200   HDL CHOLESTEROL 47 mg/dL  >81   TRIGLICERIDES 739 mg/dL H <150   LDL-CHOLESTEROL 100 mg/dL (calc) H    Reference range: <100     Desirable range <100 mg/dL for patients with CHD or  diabetes and <70 mg/dL for diabetic patients with  known heart disease  LDL-C is now calculated using the Hector-Reed   calculation, which is a validated novel method providing   better accuracy than the Friedewald equation in the   estimation of LDL-C  Valinda Boast et al  [place] 415727;219(51): 5643-8133   (http://[employer]/faq/KNT199)   CHOL/HDLC RATIO 3 7 (calc)  <5 0   NON HDL CHOLESTEROL 127 mg/dL (calc)  <130   For patients with diabetes plus 1 major ASCVD risk   factor, treating to a non-HDL-C goal of <100 mg/dL   (LDL-C of <70 mg/dL) is considered a therapeutic   option       (Q) CBC (H/H, RBC, INDICES, WBC, PLT) 22Sep2017 09:00AM James Escamilla     Test Name Result Flag Reference   WHITE BLOOD CELL COUNT 7 0 Thousand/uL  3 8-10 8   RED BLOOD CELL COUNT 4 38 Million/uL  4 20-5 80   HEMOGLOBIN 14 5 g/dL  13 2-17 1   HEMATOCRIT 42 5 %  38 5-50 0   MCV 97 0 fL  80 0-100 0   MCH 33 1 pg H 27 0-33 0   MCHC 34 1 g/dL  32 0-36 0   RDW 11 7 %  11 0-15 0   PLATELET COUNT 651 Thousand/uL H 140-400   MPV 9 9 fL  7 5-12 5     (Q) TSH, 3RD GENERATION [id number] 09:00AM James Escamilla     Test Name Result Flag Reference   TSH 2 25 mIU/L  0 40-4 50     (Q) URINALYSIS REFLEX [id number] 09:00AM James Escamilla   REPORT COMMENT:  FASTING:YES     Test Name Result Flag Reference   COLOR YELLOW  YELLOW   APPEARANCE CLEAR  CLEAR   SPECIFIC GRAVITY 1 016  1 001-1 035   PH 6 5  5 0-8 0   GLUCOSE NEGATIVE  NEGATIVE   BILIRUBIN NEGATIVE  NEGATIVE   KETONES NEGATIVE  NEGATIVE   OCCULT BLOOD NEGATIVE  NEGATIVE   PROTEIN TRACE A NEGATIVE   NITRITE NEGATIVE  NEGATIVE   LEUKOCYTE ESTERASE NEGATIVE  NEGATIVE   WBC 0-5 /HPF  < OR = 5   RBC NONE SEEN /HPF  < OR = 2   SQUAMOUS EPITHELIAL CELLS NONE SEEN /HPF  < OR = 5   BACTERIA NONE SEEN /HPF  NONE SEEN   HYALINE CAST NONE SEEN /LPF  NONE SEEN

## 2018-01-13 NOTE — RESULT NOTES
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

## 2018-01-14 VITALS
BODY MASS INDEX: 29.75 KG/M2 | WEIGHT: 212.5 LBS | RESPIRATION RATE: 20 BRPM | SYSTOLIC BLOOD PRESSURE: 120 MMHG | HEIGHT: 71 IN | HEART RATE: 80 BPM | DIASTOLIC BLOOD PRESSURE: 64 MMHG

## 2018-01-14 VITALS
SYSTOLIC BLOOD PRESSURE: 118 MMHG | DIASTOLIC BLOOD PRESSURE: 72 MMHG | WEIGHT: 209.13 LBS | RESPIRATION RATE: 16 BRPM | HEIGHT: 71 IN | HEART RATE: 80 BPM | TEMPERATURE: 98.6 F | BODY MASS INDEX: 29.28 KG/M2

## 2018-01-15 NOTE — RESULT NOTES
Verified Results  (1) LYME ANTIBODY PROFILE W/REFLEX TO WESTERN BLOT [id number] 11:31AM Asa Cuff Order Number: [id number]     Test Name Result Flag Reference   LYME IGG 0 39  0 00-0 79   NEGATIVE(0 00-0 79)-Absence of detectable Borrelia IgG Antibodies  A negative result does not exclude the possibility of Borrelia infection  If early Lyme disease is suspected,a second sample should be collected & tested 4 weeks after initial testing  LYME IGM 0 25  0 00-0 79   NEGATIVE (0 00-0 79)-Absence of detectable Borrelia IgM antibodies  A negative result does not exclude the possibility of Borrelia infection  If early lyme disease is suspected, a second sample should be collected & tested 4 weeks after initial testing

## 2018-01-15 NOTE — RESULT NOTES
Verified Results  * XR CHEST PA & LATERAL [id number] 01:38PM Aleta Mercer Order Number: [id number]     Test Name Result Flag Reference   XR CHEST PA & LATERAL (Report)     CHEST - DUAL ENERGY     INDICATION: Left side rib/chest pain  COMPARISON: 1/20/2015 and prior     VIEWS: PA (including soft tissue/bone algorithms) and lateral projections; 4 images     FINDINGS:     The cardiomediastinal silhouette is stable  The lungs are clear  No pleural effusion  Questionable thickening of the cortex of the lateral aspect of the left 9th rib  Possible old, healing or healed rib fracture  No evidence for acute fracture or complication  IMPRESSION:     No active disease  Questionable thickening of the cortex of the lateral aspect of the left 9th rib  Possible old, healing or healed rib fracture  No evidence for acute fracture or complication  Workstation performed: [id number]     Signed by:   Dilan Cedillo MD   2/11/16     XR RIBS 2 VIEW LEFT [id number] 01:38PM Kai Ivory Order Number: [id number]     Test Name Result Flag Reference   XR RIBS 2 VW LEFT (Report)     LEFT RIBS     INDICATION: Left rib/medial chest pain  COMPARISON:  Concurrent chest  Prior chest 11/1/2015 and earlier studies     VIEWS: 3 views left hemithorax; 4 images     FINDINGS:     There is no acute fracture or pathologic bone lesion  Questionable cortical thickening of the lateral aspect of the left 9th rib versus bony overlap  Possible old, healed injury  No acute or displaced fracture  Soft tissues are unremarkable  The visualized left hemithorax is unremarkable  There is no pneumothorax  IMPRESSION:     No acute injury, pneumothorax or hemothorax  Questionable thickening of the cortex of the lateral aspect of the left 9th rib  Possible old, healing or healed rib fracture          Workstation performed: [id number]     Signed by:   Dilan Cedillo MD   2/11/16

## 2018-01-16 NOTE — RESULT NOTES
Verified Results  (1) HIV AG/AB [employer] GEN [id number] 01:31PM Asa Cuff Order Number: [id number]     Test Name Result Flag Reference   HIV 1/2 AND P24 Non-Reactive  Non-Reactive   This test detects HIV 1, HIV2 and p24 Antigen

## 2018-01-16 NOTE — RESULT NOTES
Verified Results  (1) CBC/ PLT (NO DIFF) [id number] 01:31PM Gokul Escamilla     Test Name Result Flag Reference   HEMATOCRIT 44 6 %  36 5-49 3   HEMOGLOBIN 15 6 g/dL  12 0-17 0   MCHC 35 0 g/dL  31 4-37 4   MCH 33 5 pg  26 8-34 3   MCV 96 fL  82-98   PLATELET COUNT 829 Thousands/uL H 149-390   RBC COUNT 4 66 Million/uL  3 88-5 62   RDW 12 0 %  11 6-15 1   WBC COUNT 5 34 Thousand/uL  4 31-10 16   MPV 9 9 fL  8 9-12 7     (1) COMPREHENSIVE METABOLIC PANEL [id number] 74:56MW James Escamilla     Test Name Result Flag Reference   SODIUM 141 mmol/L  136-145   POTASSIUM 4 0 mmol/L  3 5-5 3   CHLORIDE 106 mmol/L  100-108   CARBON DIOXIDE 27 mmol/L  21-32   ANION GAP (CALC) 8 mmol/L  4-13   BLOOD UREA NITROGEN 10 mg/dL  5-25   CREATININE 0 95 mg/dL  0 60-1 30   Standardized to IDMS reference method   CALCIUM 9 1 mg/dL  8 3-10 1   BILI, TOTAL 0 41 mg/dL  0 20-1 00   ALK PHOSPHATAS 99 U/L     ALT (SGPT) 36 U/L  12-78   Specimen collection should occur prior to Sulfasalazine and/or Sulfapyridine administration due to the potential for falsely depressed results  AST(SGOT) 20 U/L  5-45   Specimen collection should occur prior to Sulfasalazine administration due to the potential for falsely depressed results  ALBUMIN 3 6 g/dL  3 5-5 0   TOTAL PROTEIN 7 6 g/dL  6 4-8 2   eGFR 96 ml/min/1 73sq m     [facility] Disease Education Program recommendations are as follows:  GFR calculation is accurate only with a steady state creatinine  Chronic Kidney disease less than 60 ml/min/1 73 sq  meters  Kidney failure less than 15 ml/min/1 73 sq  meters  GLUCOSE FASTING 143 mg/dL H 65-99   Specimen collection should occur prior to Sulfasalazine administration due to the potential for falsely depressed results  Specimen collection should occur prior to Sulfapyridine administration due to the potential for falsely elevated results       (1) TSH [id number] 01:31PGokul Hartmann     Test Name Result Flag Reference   TSH 1 300 uIU/mL  0 358-3 740   Patients undergoing fluorescein dye angiography may retain small amounts of fluorescein in the body for 48-72 hours post procedure  Samples containing fluorescein can produce falsely depressed TSH values  If the patient had this procedure,a specimen should be resubmitted post fluorescein clearance       (1) URINALYSIS (will reflex a microscopy if leukocytes, occult blood, protein or nitrites are not within normal limits) [id number] 01:31PANNABEL ShankarEb thompsonothy     Test Name Result Flag Reference   COLOR Yellow     CLARITY Clear     SPECIFIC GRAVITY UA 1 014  1 003-1 030   PH UA 6 0  4 5-8 0   LEUKOCYTE ESTERASE UA Negative  Negative   NITRITE UA Negative  Negative   PROTEIN UA 30 (1+) mg/dl A Negative   GLUCOSE UA Negative mg/dl  Negative   KETONES UA Negative mg/dl  Negative   UROBILINOGEN UA 0 2 E U /dl  0 2, 1 0 E U /dl   BILIRUBIN UA Negative  Negative   BLOOD UA Large A Negative   BACTERIA None Seen /hpf  None Seen, Occasional   EPITHELIAL CELLS None Seen /hpf  None Seen, Occasional   HYALINE CASTS 25-50 /lpf A None Seen   RBC UA None Seen /hpf  None Seen   WBC UA None Seen /hpf  None Seen     (1) ACUTE HEPATITIS PANEL [id number] 01:KORIN ShankarJames thompson     Test Name Result Flag Reference   HEPATITIS B SURFACE ANTIGEN Non-reactive  Non-reactive, NonReactive - Confirmed   HEPATITIS A IGM ANTIBODY Non-reactive  Non-reactive, Equivocal-Suggest Recollect   HEPATITIS C ANTIBODY Non-reactive  Non-reactive   HEPATITIS B CORE IGM ANTIBODY Non-reactive  Non-reactive     (1) VITAMIN D 25-HYDROXY [id number] 01:KORIN James Escamilla     Test Name Result Flag Reference   VIT D 25-HYDROX 32 8 ng/mL  30 0-100 0   This assay is a certified procedure of the CDC Vitamin D Standardization Certification Program (VDSCP)     Deficiency <20ng/ml   Insufficiency 20-30ng/ml   Sufficient  ng/ml     *Patients undergoing fluorescein dye angiography may retain small amounts of fluorescein in the body for 48-72 hours post procedure  Samples containing fluorescein can produce falsely elevated Vitamin D values  If the patient had this procedure, a specimen should be resubmitted post fluorescein clearance       (1) CK (CPK) [id number] 01:31PM Sara Escamilla     Test Name Result Flag Reference   CK (CPK) 74 U/L

## 2018-01-16 NOTE — RESULT NOTES
Verified Results  (1) RPR [id number] 01:31PM Katlyn Donald Order Number: [id number]     Test Name Result Flag Reference   RPR Non-Reactive  Non-Reactive

## 2018-01-16 NOTE — RESULT NOTES
Verified Results  (1) BANG SCREEN W/REFLEX TO TITER/PATTERN [id number] 11:31AM Caro Breed Order Number: [id number]     Test Name Result Flag Reference   BANG SCREEN   Negative  Negative

## 2018-01-18 NOTE — RESULT NOTES
Verified Results  (1) VITAMIN D 25-HYDROXY [id number] 11:31AM [email or web address] Order Number: [id number]     Test Name Result Flag Reference   VIT D 25-HYDROX 13 2 ng/mL L 30 0-100 0   This assay is a certified procedure of the CDC Vitamin D Standardization Certification Program (VDSCP)     Deficiency <20ng/ml   Insufficiency 20-30ng/ml   Sufficient  ng/ml     *Patients undergoing fluorescein dye angiography may retain small amounts of fluorescein in the body for 48-72 hours post procedure  Samples containing fluorescein can produce falsely elevated Vitamin D values  If the patient had this procedure, a specimen should be resubmitted post fluorescein clearance  (1) SED RATE [id number] 11:31AM [email or web address] Order Number: [id number]     Test Name Result Flag Reference   SED RATE 7 mm/hour  0-10     (1) URIC ACID [id number] 11:31AM [email or web address] Order Number: [id number]     Test Name Result Flag Reference   URIC ACID 7 6 mg/dL  4 2-8 0   Specimen collection should occur prior to Metamizole administration due to the potential for falsely depressed results  Plan  Vitamin D deficiency    · Vitamin D3 5000 UNIT Oral Tablet; One daily   · (1) VITAMIN D 25-HYDROXY; Status:Active;  Requested for:[id number];

## 2018-01-18 NOTE — RESULT NOTES
Verified Results  (1) HERPES I/II ANTIBODIES, IGM [id number] 01:31PM Loral[facility] Meigs Order Number: [id number]     Test Name Result Flag Reference   HSVI/II COMB AB IGM 1 25 Ratio H 0 00 - 0 90   Negative        <0 91                                   Equivocal 0 91 - 1 09                                   Positive        >1 09    Performed at:  [address] [place] [address]  [id number]  : Annie Carreon MD, Phone:  [phone]

## 2018-01-22 VITALS
RESPIRATION RATE: 68 BRPM | BODY MASS INDEX: 29.7 KG/M2 | HEART RATE: 16 BPM | HEIGHT: 71 IN | WEIGHT: 212.13 LBS | DIASTOLIC BLOOD PRESSURE: 74 MMHG | SYSTOLIC BLOOD PRESSURE: 120 MMHG

## 2018-03-05 DIAGNOSIS — K22.70 BARRETT'S ESOPHAGUS WITHOUT DYSPLASIA: ICD-10-CM

## 2018-03-05 DIAGNOSIS — E78.5 HYPERLIPIDEMIA: ICD-10-CM

## 2018-03-05 DIAGNOSIS — R31.9 HEMATURIA: ICD-10-CM

## 2018-03-05 DIAGNOSIS — G47.19 OTHER HYPERSOMNIA: ICD-10-CM

## 2018-03-05 DIAGNOSIS — K21.9 GASTRO-ESOPHAGEAL REFLUX DISEASE WITHOUT ESOPHAGITIS: ICD-10-CM

## 2018-03-05 DIAGNOSIS — M79.10 MYALGIA: ICD-10-CM

## 2018-03-05 DIAGNOSIS — E55.9 VITAMIN D DEFICIENCY: ICD-10-CM

## 2018-03-05 DIAGNOSIS — R76.8 OTHER SPECIFIED ABNORMAL IMMUNOLOGICAL FINDINGS IN SERUM: ICD-10-CM

## 2018-03-05 DIAGNOSIS — B00.9 HERPESVIRAL INFECTION: ICD-10-CM

## 2018-03-05 DIAGNOSIS — D47.3 ESSENTIAL HEMORRHAGIC THROMBOCYTHEMIA (HCC): ICD-10-CM

## 2018-04-10 ENCOUNTER — TRANSCRIBE ORDERS (OUTPATIENT)
Dept: ADMINISTRATIVE | Facility: HOSPITAL | Age: 48
End: 2018-04-10

## 2018-05-13 DIAGNOSIS — B00.9 HERPES SIMPLEX TYPE 1 INFECTION: Primary | ICD-10-CM

## 2018-05-14 PROBLEM — G47.19 EXCESSIVE DAYTIME SLEEPINESS: Status: ACTIVE | Noted: 2017-08-15

## 2018-05-14 PROBLEM — R76.8 ELEVATED RHEUMATOID FACTOR: Status: ACTIVE | Noted: 2017-05-09

## 2018-05-14 PROBLEM — R53.83 FATIGUE: Status: ACTIVE | Noted: 2017-06-09

## 2018-05-14 PROBLEM — E55.9 VITAMIN D DEFICIENCY: Status: ACTIVE | Noted: 2017-05-06

## 2018-05-14 PROBLEM — R31.9 HEMATURIA: Status: ACTIVE | Noted: 2017-08-17

## 2018-05-14 RX ORDER — VALACYCLOVIR HYDROCHLORIDE 500 MG/1
TABLET, FILM COATED ORAL
Qty: 30 TABLET | Refills: 5 | Status: SHIPPED | OUTPATIENT
Start: 2018-05-14 | End: 2018-11-19 | Stop reason: SDUPTHER

## 2018-06-17 DIAGNOSIS — E78.5 HYPERLIPIDEMIA, UNSPECIFIED HYPERLIPIDEMIA TYPE: Primary | ICD-10-CM

## 2018-06-18 RX ORDER — ATORVASTATIN CALCIUM 10 MG/1
TABLET, FILM COATED ORAL
Qty: 90 TABLET | Refills: 3 | Status: SHIPPED | OUTPATIENT
Start: 2018-06-18 | End: 2019-06-26 | Stop reason: SDUPTHER

## 2018-08-31 ENCOUNTER — TELEPHONE (OUTPATIENT)
Dept: FAMILY MEDICINE CLINIC | Facility: CLINIC | Age: 48
End: 2018-08-31

## 2018-08-31 DIAGNOSIS — K21.9 GASTROESOPHAGEAL REFLUX DISEASE, ESOPHAGITIS PRESENCE NOT SPECIFIED: Primary | ICD-10-CM

## 2018-08-31 DIAGNOSIS — R59.1 LYMPHADENOPATHY: ICD-10-CM

## 2018-08-31 DIAGNOSIS — E55.9 VITAMIN D DEFICIENCY: ICD-10-CM

## 2018-08-31 DIAGNOSIS — D75.839 THROMBOCYTOSIS: ICD-10-CM

## 2018-08-31 DIAGNOSIS — E78.5 HYPERLIPIDEMIA, UNSPECIFIED HYPERLIPIDEMIA TYPE: ICD-10-CM

## 2018-11-19 DIAGNOSIS — B00.9 HERPES SIMPLEX TYPE 1 INFECTION: ICD-10-CM

## 2018-11-19 RX ORDER — VALACYCLOVIR HYDROCHLORIDE 500 MG/1
TABLET, FILM COATED ORAL
Qty: 30 TABLET | Refills: 5 | Status: SHIPPED | OUTPATIENT
Start: 2018-11-19 | End: 2019-05-21 | Stop reason: SDUPTHER

## 2018-12-13 LAB
25(OH)D3 SERPL-MCNC: 26 NG/ML (ref 30–100)
ALBUMIN SERPL-MCNC: 4 G/DL (ref 3.6–5.1)
ALBUMIN/GLOB SERPL: 1.5 (CALC) (ref 1–2.5)
ALP SERPL-CCNC: 92 U/L (ref 40–115)
ALT SERPL-CCNC: 33 U/L (ref 9–46)
AST SERPL-CCNC: 27 U/L (ref 10–40)
BACTERIA UR QL AUTO: NORMAL /HPF
BASOPHILS # BLD AUTO: 90 CELLS/UL (ref 0–200)
BASOPHILS NFR BLD AUTO: 1.3 %
BILIRUB SERPL-MCNC: 0.5 MG/DL (ref 0.2–1.2)
BUN SERPL-MCNC: 14 MG/DL (ref 7–25)
BUN/CREAT SERPL: NORMAL (CALC) (ref 6–22)
CALCIUM SERPL-MCNC: 9.4 MG/DL (ref 8.6–10.3)
CHLORIDE SERPL-SCNC: 105 MMOL/L (ref 98–110)
CHOLEST SERPL-MCNC: 232 MG/DL
CHOLEST/HDLC SERPL: 4.5 (CALC)
CO2 SERPL-SCNC: 26 MMOL/L (ref 20–32)
CREAT SERPL-MCNC: 0.97 MG/DL (ref 0.6–1.35)
EOSINOPHIL # BLD AUTO: 297 CELLS/UL (ref 15–500)
EOSINOPHIL NFR BLD AUTO: 4.3 %
ERYTHROCYTE [DISTWIDTH] IN BLOOD BY AUTOMATED COUNT: 11.8 % (ref 11–15)
GLOBULIN SER CALC-MCNC: 2.7 G/DL (CALC) (ref 1.9–3.7)
GLUCOSE SERPL-MCNC: 90 MG/DL (ref 65–99)
HCT VFR BLD AUTO: 41 % (ref 38.5–50)
HDLC SERPL-MCNC: 51 MG/DL
HGB BLD-MCNC: 14.1 G/DL (ref 13.2–17.1)
HYALINE CASTS #/AREA URNS LPF: NORMAL /LPF
LDLC SERPL CALC-MCNC: 142 MG/DL (CALC)
LYMPHOCYTES # BLD AUTO: 2270 CELLS/UL (ref 850–3900)
LYMPHOCYTES NFR BLD AUTO: 32.9 %
MCH RBC QN AUTO: 32.6 PG (ref 27–33)
MCHC RBC AUTO-ENTMCNC: 34.4 G/DL (ref 32–36)
MCV RBC AUTO: 94.9 FL (ref 80–100)
MONOCYTES # BLD AUTO: 573 CELLS/UL (ref 200–950)
MONOCYTES NFR BLD AUTO: 8.3 %
NEUTROPHILS # BLD AUTO: 3671 CELLS/UL (ref 1500–7800)
NEUTROPHILS NFR BLD AUTO: 53.2 %
NONHDLC SERPL-MCNC: 181 MG/DL (CALC)
PLATELET # BLD AUTO: 390 THOUSAND/UL (ref 140–400)
PMV BLD REES-ECKER: 10 FL (ref 7.5–12.5)
POTASSIUM SERPL-SCNC: 4.4 MMOL/L (ref 3.5–5.3)
PROT SERPL-MCNC: 6.7 G/DL (ref 6.1–8.1)
RBC # BLD AUTO: 4.32 MILLION/UL (ref 4.2–5.8)
RBC #/AREA URNS HPF: NORMAL /HPF
SL AMB EGFR AFRICAN AMERICAN: 107 ML/MIN/1.73M2
SL AMB EGFR NON AFRICAN AMERICAN: 92 ML/MIN/1.73M2
SODIUM SERPL-SCNC: 141 MMOL/L (ref 135–146)
SQUAMOUS #/AREA URNS HPF: NORMAL /HPF
TRIGL SERPL-MCNC: 251 MG/DL
TSH SERPL-ACNC: 2.3 MIU/L (ref 0.4–4.5)
WBC # BLD AUTO: 6.9 THOUSAND/UL (ref 3.8–10.8)
WBC #/AREA URNS HPF: NORMAL /HPF

## 2018-12-14 RX ORDER — OMEPRAZOLE 40 MG/1
40 CAPSULE, DELAYED RELEASE ORAL
COMMUNITY
End: 2018-12-17 | Stop reason: ALTCHOICE

## 2018-12-14 RX ORDER — MAG HYDROX/ALUMINUM HYD/SIMETH 400-400-40
SUSPENSION, ORAL (FINAL DOSE FORM) ORAL DAILY
COMMUNITY
Start: 2017-05-06 | End: 2018-12-17

## 2018-12-17 ENCOUNTER — OFFICE VISIT (OUTPATIENT)
Dept: FAMILY MEDICINE CLINIC | Facility: CLINIC | Age: 48
End: 2018-12-17
Payer: COMMERCIAL

## 2018-12-17 VITALS
HEIGHT: 72 IN | HEART RATE: 88 BPM | WEIGHT: 215 LBS | BODY MASS INDEX: 29.12 KG/M2 | DIASTOLIC BLOOD PRESSURE: 88 MMHG | SYSTOLIC BLOOD PRESSURE: 120 MMHG

## 2018-12-17 DIAGNOSIS — E66.3 OVERWEIGHT (BMI 25.0-29.9): ICD-10-CM

## 2018-12-17 DIAGNOSIS — K21.9 GASTROESOPHAGEAL REFLUX DISEASE, ESOPHAGITIS PRESENCE NOT SPECIFIED: Primary | ICD-10-CM

## 2018-12-17 DIAGNOSIS — K22.70 BARRETT'S ESOPHAGUS WITHOUT DYSPLASIA: ICD-10-CM

## 2018-12-17 DIAGNOSIS — Z00.00 HEALTH CARE MAINTENANCE: ICD-10-CM

## 2018-12-17 DIAGNOSIS — J30.9 ALLERGIC RHINITIS, UNSPECIFIED SEASONALITY, UNSPECIFIED TRIGGER: ICD-10-CM

## 2018-12-17 DIAGNOSIS — Z23 NEED FOR INFLUENZA VACCINATION: ICD-10-CM

## 2018-12-17 DIAGNOSIS — E78.5 HYPERLIPIDEMIA, UNSPECIFIED HYPERLIPIDEMIA TYPE: ICD-10-CM

## 2018-12-17 DIAGNOSIS — E55.9 VITAMIN D DEFICIENCY: ICD-10-CM

## 2018-12-17 DIAGNOSIS — B00.9 HERPES SIMPLEX TYPE 1 INFECTION: ICD-10-CM

## 2018-12-17 PROCEDURE — 90686 IIV4 VACC NO PRSV 0.5 ML IM: CPT

## 2018-12-17 PROCEDURE — 99214 OFFICE O/P EST MOD 30 MIN: CPT | Performed by: FAMILY MEDICINE

## 2018-12-17 PROCEDURE — 90471 IMMUNIZATION ADMIN: CPT

## 2018-12-17 RX ORDER — ERGOCALCIFEROL 1.25 MG/1
50000 CAPSULE ORAL WEEKLY
Qty: 4 CAPSULE | Refills: 11 | Status: SHIPPED | OUTPATIENT
Start: 2018-12-17 | End: 2020-08-06

## 2018-12-17 NOTE — PATIENT INSTRUCTIONS
Low Fat Diet   AMBULATORY CARE:   A low-fat diet  is an eating plan that is low in total fat, unhealthy fat, and cholesterol  You may need to follow a low-fat diet if you have trouble digesting or absorbing fat  You may also need to follow this diet if you have high cholesterol  You can also lower your cholesterol by increasing the amount of fiber in your diet  Soluble fiber is a type of fiber that helps to decrease cholesterol levels  Different types of fat in food:   · Limit unhealthy fats  A diet that is high in cholesterol, saturated fat, and trans fat may cause unhealthy cholesterol levels  Unhealthy cholesterol levels increase your risk of heart disease  ¨ Cholesterol:  Limit intake of cholesterol to less than 200 mg per day  Cholesterol is found in meat, eggs, and dairy  ¨ Saturated fat:  Limit saturated fat to less than 7% of your total daily calories  Ask your dietitian how many calories you need each day  Saturated fat is found in butter, cheese, ice cream, whole milk, and palm oil  Saturated fat is also found in meat, such as beef, pork, chicken skin, and processed meats  Processed meats include sausage, hot dogs, and bologna  ¨ Trans fat:  Avoid trans fat as much as possible  Trans fat is used in fried and baked foods  Foods that say trans fat free on the label may still have up to 0 5 grams of trans fat per serving  · Include healthy fats  Replace foods that are high in saturated and trans fat with foods high in healthy fats  This may help to decrease high cholesterol levels  ¨ Monounsaturated fats: These are found in avocados, nuts, and vegetable oils, such as olive, canola, and sunflower oil  ¨ Polyunsaturated fats: These can be found in vegetable oils, such as soybean or corn oil  Omega-3 fats can help to decrease the risk of heart disease  Omega-3 fats are found in fish, such as salmon, herring, trout, and tuna   Omega-3 fats can also be found in plant foods, such as walnuts, flaxseed, soybeans, and canola oil    Foods to limit or avoid:   · Grains:      ¨ Snacks that are made with partially hydrogenated oils, such as chips, regular crackers, and butter-flavored popcorn    ¨ High-fat baked goods, such as biscuits, croissants, doughnuts, pies, cookies, and pastries    · Dairy:      ¨ Whole milk, 2% milk, and yogurt and ice cream made with whole milk    ¨ Half and half creamer, heavy cream, and whipping cream    ¨ Cheese, cream cheese, and sour cream    · Meats and proteins:      ¨ High-fat cuts of meat (T-bone steak, regular hamburger, and ribs)    ¨ Fried meat, poultry (turkey and chicken), and fish    ¨ Poultry (chicken and turkey) with skin    ¨ Cold cuts (salami or bologna), hot dogs, santiago, and sausage    ¨ Whole eggs and egg yolks    · Vegetables and fruits with added fat:      ¨ Fried vegetables or vegetables in butter or high-fat sauces, such as cream or cheese sauces    ¨ Fried fruit or fruit served with butter or cream    · Fats:      ¨ Butter, stick margarine, and shortening    ¨ Coconut, palm oil, and palm kernel oil  Foods to include:   · Grains:      ¨ Whole-grain breads, cereals, pasta, and brown rice    ¨ Low-fat crackers and pretzels    · Vegetables and fruits:      ¨ Fresh, frozen, or canned vegetables (no salt or low-sodium)    ¨ Fresh, frozen, dried, or canned fruit (canned in light syrup or fruit juice)    ¨ Avocado    · Low-fat dairy products:      ¨ Nonfat (skim) or 1% milk    ¨ Nonfat or low-fat cheese, yogurt, and cottage cheese    · Meats and proteins:      ¨ Chicken or turkey with no skin    ¨ Baked or broiled fish    ¨ Lean beef and pork (loin, round, extra lean hamburger)    ¨ Beans and peas, unsalted nuts, soy products    ¨ Egg whites and substitutes    ¨ Seeds and nuts    · Fats:      ¨ Unsaturated oil, such as canola, olive, peanut, soybean, or sunflower oil    ¨ Soft or liquid margarine and vegetable oil spread    ¨ Low-fat salad dressing  Other ways to decrease fat:   · Read food labels before you buy foods  Choose foods that have less than 30% of calories from fat  Choose low-fat or fat-free dairy products  Remember that fat free does not mean calorie free  These foods still contain calories, and too many calories can lead to weight gain  · Trim fat from meat and avoid fried food  Trim all visible fat from meat before you cook it  Remove the skin from poultry  Do not márquez meat, fish, or poultry  Bake, roast, boil, or broil these foods instead  Avoid fried foods  Eat a baked potato instead of Western Scarlett fries  Steam vegetables instead of sautéing them in butter  · Add less fat to foods  Use imitation santiago bits on salads and baked potatoes instead of regular santiago bits  Use fat-free or low-fat salad dressings instead of regular dressings  Use low-fat or nonfat butter-flavored topping instead of regular butter or margarine on popcorn and other foods  Ways to decrease fat in recipes:  Replace high-fat ingredients with low-fat or nonfat ones  This may cause baked goods to be drier than usual  You may need to use nonfat cooking spray on pans to prevent food from sticking  You also may need to change the amount of other ingredients, such as water, in the recipe  Try the following:  · Use low-fat or light margarine instead of regular margarine or shortening  · Use lean ground turkey breast or chicken, or lean ground beef (less than 5% fat) instead of hamburger  · Add 1 teaspoon of canola oil to 8 ounces of skim milk instead of using cream or half and half  · Use grated zucchini, carrots, or apples in breads instead of coconut  · Use blenderized, low-fat cottage cheese, plain tofu, or low-fat ricotta cheese instead of cream cheese  · Use 1 egg white and 1 teaspoon of canola oil, or use ¼ cup (2 ounces) of fat-free egg substitute instead of a whole egg       · Replace half of the oil that is called for in a recipe with applesauce when you bake  Use 3 tablespoons of cocoa powder and 1 tablespoon of canola oil instead of a square of baking chocolate  How to increase fiber:  Eat enough high-fiber foods to get 20 to 30 grams of fiber every day  Slowly increase your fiber intake to avoid stomach cramps, gas, and other problems  · Eat 3 ounces of whole-grain foods each day  An ounce is about 1 slice of bread  Eat whole-grain breads, such as whole-wheat bread  Whole wheat, whole-wheat flour, or other whole grains should be listed as the first ingredient on the food label  Replace white flour with whole-grain flour or use half of each in recipes  Whole-grain flour is heavier than white flour, so you may have to add more yeast or baking powder  · Eat a high-fiber cereal for breakfast   Oatmeal is a good source of soluble fiber  Look for cereals that have bran or fiber in the name  Choose whole-grain products, such as brown rice, barley, and whole-wheat pasta  · Eat more beans, peas, and lentils  For example, add beans to soups or salads  Eat at least 5 cups of fruits and vegetables each day  Eat fruits and vegetables with the peel because the peel is high in fiber  © 2017 [employer] Information is for End User's use only and may not be sold, redistributed or otherwise used for commercial purposes  All illustrations and images included in CareNotes® are the copyrighted property of [employer]  or Timmy Mckenzie  The above information is an  only  It is not intended as medical advice for individual conditions or treatments  Talk to your doctor, nurse or pharmacist before following any medical regimen to see if it is safe and effective for you    Return in 4 months for office visit blood work

## 2018-12-17 NOTE — ASSESSMENT & PLAN NOTE
Patient follow low-fat low-cholesterol diet increase exercise repeat in 4 months    If not at goal increase statin

## 2018-12-17 NOTE — PROGRESS NOTES
Assessment/Plan:  1  GERD/Brennan's esophagus, stable status post EGD 12/17 patient follows with Gastroenterology  2  Allergic rhinitis, currently asymptomatic  3  Vitamin-D deficiency I changed to vitamin-D 85894 units weekly  4  Hyperlipidemia  Patient wishes not increase statin at the present time will increase his exercise and follow low cholesterol low fat diet  If not at goal in 4 months I will consider raising statin therapy dose  5  Herpes simplex, stable continue present therapy  6  Health care maintenance, influenza vaccine given today  7  Patient return in 4 months for office visit blood work, sooner if needed  Health care maintenance  Influenza vaccine given    GERD (gastroesophageal reflux disease)  Stable follows with Gastroenterology    Brennan esophagus  Status post EGD 12/17 follows with Gastroenterology    Allergic rhinitis  Asymptomatic at present    Vitamin D deficiency  Change vitamin-D to 86013 units weekly    Hyperlipidemia  Patient follow low-fat low-cholesterol diet increase exercise repeat in 4 months  If not at goal increase statin    Herpes simplex type 1 infection  Stable continue present therapy       Diagnoses and all orders for this visit:    Gastroesophageal reflux disease, esophagitis presence not specified  -     Comprehensive metabolic panel; Future  -     Lipid Panel with Direct LDL reflex; Future  -     Vitamin D 25 hydroxy; Future    Brennan's esophagus without dysplasia  -     Comprehensive metabolic panel; Future  -     Lipid Panel with Direct LDL reflex; Future  -     Vitamin D 25 hydroxy; Future    Allergic rhinitis, unspecified seasonality, unspecified trigger  -     Comprehensive metabolic panel; Future  -     Lipid Panel with Direct LDL reflex; Future  -     Vitamin D 25 hydroxy; Future    Vitamin D deficiency  -     ergocalciferol (VITAMIN D2) 50,000 units;  Take 1 capsule (50,000 Units total) by mouth once a week  -     Comprehensive metabolic panel; Future  -     Lipid Panel with Direct LDL reflex; Future  -     Vitamin D 25 hydroxy; Future    Hyperlipidemia, unspecified hyperlipidemia type  -     Comprehensive metabolic panel; Future  -     Lipid Panel with Direct LDL reflex; Future  -     Vitamin D 25 hydroxy; Future    Health care maintenance  -     Comprehensive metabolic panel; Future  -     Lipid Panel with Direct LDL reflex; Future  -     Vitamin D 25 hydroxy; Future    Herpes simplex type 1 infection  -     Comprehensive metabolic panel; Future  -     Lipid Panel with Direct LDL reflex; Future  -     Vitamin D 25 hydroxy; Future    Need for influenza vaccination  -     SYRINGE/SINGLE-DOSE VIAL: influenza vaccine, [id number], quadrivalent, 0 5 mL, preservative-free (AFLURIA, FLUARIX, FLULAVAL, FLUZONE)    Overweight (BMI 25 0-29  9)          Subjective: Follow up to chronic conditions and review bw results  [facility] [place]     Patient ID: Cooper Reynaga is a 50 y o  male  Patient states for the past months he has not really been eating well stops exercising  His cholesterol has risen  Patient does forget to take his vitamin-D many days  Patient states he does take his atorvastatin 10 mg daily  Blood work was reviewed with the patient        The following portions of the patient's history were reviewed and updated as appropriate: allergies, current medications, past family history, past medical history, past social history, past surgical history and problem list     Review of Systems   Constitutional: Negative  HENT: Negative  Eyes: Negative  Respiratory: Negative  Cardiovascular: Negative  Gastrointestinal: Negative  Endocrine: Negative  Genitourinary: Negative  Musculoskeletal: Negative  Skin: Negative  Allergic/Immunologic: Positive for environmental allergies  Neurological: Negative  Hematological: Negative  Psychiatric/Behavioral: Negative            Objective:      /88   Pulse 88   Ht 6' (1 829 m) Wt 97 5 kg (215 lb)   BMI 29 16 kg/m²          Physical Exam   Constitutional: He is oriented to person, place, and time  He appears well-developed and well-nourished  HENT:   Head: Normocephalic and atraumatic  Mouth/Throat: Oropharynx is clear and moist    Eyes: Pupils are equal, round, and reactive to light  Conjunctivae and EOM are normal  No scleral icterus  Neck: Neck supple  No thyromegaly present  Cardiovascular: Normal rate, regular rhythm and normal heart sounds  Pulmonary/Chest: Effort normal and breath sounds normal    Abdominal: Soft  Bowel sounds are normal  There is no tenderness  Musculoskeletal: He exhibits no edema  Lymphadenopathy:     He has no cervical adenopathy  Neurological: He is alert and oriented to person, place, and time  No cranial nerve deficit  Skin: Skin is warm and dry  Psychiatric: He has a normal mood and affect  BMI Counseling: Body mass index is 29 16 kg/m²  Discussed the patient's BMI with him  The BMI is above average  BMI counseling and education was provided to the patient  Nutrition recommendations include reducing intake of cholesterol  Exercise recommendations include moderate aerobic physical activity for 150 minutes/week

## 2019-02-05 ENCOUNTER — OFFICE VISIT (OUTPATIENT)
Dept: FAMILY MEDICINE CLINIC | Facility: CLINIC | Age: 49
End: 2019-02-05
Payer: COMMERCIAL

## 2019-02-05 VITALS
WEIGHT: 213 LBS | TEMPERATURE: 97.8 F | SYSTOLIC BLOOD PRESSURE: 112 MMHG | HEIGHT: 72 IN | DIASTOLIC BLOOD PRESSURE: 80 MMHG | BODY MASS INDEX: 28.85 KG/M2

## 2019-02-05 DIAGNOSIS — J30.9 ALLERGIC RHINITIS, UNSPECIFIED SEASONALITY, UNSPECIFIED TRIGGER: Primary | ICD-10-CM

## 2019-02-05 DIAGNOSIS — K21.9 GASTROESOPHAGEAL REFLUX DISEASE, ESOPHAGITIS PRESENCE NOT SPECIFIED: ICD-10-CM

## 2019-02-05 DIAGNOSIS — H65.03 BILATERAL ACUTE SEROUS OTITIS MEDIA, RECURRENCE NOT SPECIFIED: ICD-10-CM

## 2019-02-05 DIAGNOSIS — E66.3 OVERWEIGHT (BMI 25.0-29.9): ICD-10-CM

## 2019-02-05 DIAGNOSIS — J02.9 SORE THROAT: ICD-10-CM

## 2019-02-05 DIAGNOSIS — H69.80 DYSFUNCTION OF EUSTACHIAN TUBE, UNSPECIFIED LATERALITY: ICD-10-CM

## 2019-02-05 DIAGNOSIS — K22.70 BARRETT'S ESOPHAGUS WITHOUT DYSPLASIA: ICD-10-CM

## 2019-02-05 LAB — S PYO AG THROAT QL: NEGATIVE

## 2019-02-05 PROCEDURE — 3008F BODY MASS INDEX DOCD: CPT | Performed by: FAMILY MEDICINE

## 2019-02-05 PROCEDURE — 96372 THER/PROPH/DIAG INJ SC/IM: CPT | Performed by: FAMILY MEDICINE

## 2019-02-05 PROCEDURE — 1036F TOBACCO NON-USER: CPT | Performed by: FAMILY MEDICINE

## 2019-02-05 PROCEDURE — 87880 STREP A ASSAY W/OPTIC: CPT | Performed by: FAMILY MEDICINE

## 2019-02-05 PROCEDURE — 99214 OFFICE O/P EST MOD 30 MIN: CPT | Performed by: FAMILY MEDICINE

## 2019-02-05 RX ORDER — TRIAMCINOLONE ACETONIDE 40 MG/ML
40 INJECTION, SUSPENSION INTRA-ARTICULAR; INTRAMUSCULAR ONCE
Status: COMPLETED | OUTPATIENT
Start: 2019-02-05 | End: 2019-02-05

## 2019-02-05 RX ORDER — OMEPRAZOLE 20 MG/1
20 CAPSULE, DELAYED RELEASE ORAL DAILY
Qty: 30 CAPSULE | Refills: 5 | Status: SHIPPED | OUTPATIENT
Start: 2019-02-05 | End: 2019-10-29 | Stop reason: SDUPTHER

## 2019-02-05 RX ADMIN — TRIAMCINOLONE ACETONIDE 40 MG: 40 INJECTION, SUSPENSION INTRA-ARTICULAR; INTRAMUSCULAR at 09:46

## 2019-02-05 NOTE — PATIENT INSTRUCTIONS
Complete barium swallow  Return in 1 week if still symptoms  BMI is 28 prefer to 25 follow low-fat low-cholesterol diet increase exercise    Recheck 1 month

## 2019-02-05 NOTE — PROGRESS NOTES
Assessment/Plan:  1  Allergic rhinitis/eustachian tube dysfunction/serous otitis media, Kenalog 40 mg IM was given  2  GERD 3  Brennan's esophagus, omeprazole started  Barium swallow was ordered  4  Sore throat  Probably secondary to above rapid strep was negative  5  Recheck 1 month, sooner if needed       Allergic rhinitis  Kenalog 40 IM    GERD (gastroesophageal reflux disease)  Barium swallow was ordered, omeprazole was started    Brennan esophagus  Status post EGD 12/12/2017 follows with Gastroenterology       Diagnoses and all orders for this visit:    Allergic rhinitis, unspecified seasonality, unspecified trigger  -     triamcinolone acetonide (KENALOG-40) 40 mg/mL injection 40 mg; Inject 1 mL (40 mg total) into a muscle once     Gastroesophageal reflux disease, esophagitis presence not specified  -     omeprazole (PriLOSEC) 20 mg delayed release capsule; Take 1 capsule (20 mg total) by mouth daily  -     FL barium swallow; Future    Bilateral acute serous otitis media, recurrence not specified  -     triamcinolone acetonide (KENALOG-40) 40 mg/mL injection 40 mg; Inject 1 mL (40 mg total) into a muscle once     Dysfunction of Eustachian tube, unspecified laterality  -     triamcinolone acetonide (KENALOG-40) 40 mg/mL injection 40 mg; Inject 1 mL (40 mg total) into a muscle once     Brennan's esophagus without dysplasia  -     omeprazole (PriLOSEC) 20 mg delayed release capsule; Take 1 capsule (20 mg total) by mouth daily  -     FL barium swallow; Future    Overweight (BMI 25 0-29  9)    Sore throat          Subjective:   C/o lost voice for a couple days  He questions if it's from reflux  He had reflux surgery  This symptom was prior to surgery too   mjs     Patient ID: Zabrina Schwarz is a 50 y o  male  Patient has been having some postnasal drip also some reflux symptoms he did have GERD surgery approximately 2-3 years ago  Patient was worse last week    No fever chills no sinus pain or pressure  The following portions of the patient's history were reviewed and updated as appropriate: allergies, current medications, past family history, past medical history, past social history, past surgical history and problem list     Review of Systems   Constitutional:        HPI   HENT:        HPI   Eyes: Negative  Respiratory: Negative  Cardiovascular: Negative  Gastrointestinal:        HPI   Endocrine: Negative  Genitourinary: Negative  Musculoskeletal: Negative  Skin: Negative  Allergic/Immunologic: Positive for environmental allergies  Neurological: Negative  Hematological: Negative  Psychiatric/Behavioral: Negative  Objective:      /80   Temp 97 8 °F (36 6 °C)   Ht 6' (1 829 m)   Wt 96 6 kg (213 lb)   BMI 28 89 kg/m²          Physical Exam   Constitutional: He is oriented to person, place, and time  He appears well-developed and well-nourished  HENT:   Head: Normocephalic and atraumatic  Mouth/Throat: No oropharyngeal exudate  Both tympanic membranes dull with fluid no injection positive allergic turbinates negative sinus tenderness to percussion positive clear postnasal drip negative pharyngeal injection or exudate   Eyes: Pupils are equal, round, and reactive to light  Conjunctivae and EOM are normal  No scleral icterus  Neck: Neck supple  No thyromegaly present  Cardiovascular: Normal rate, regular rhythm and normal heart sounds  Pulmonary/Chest: Effort normal and breath sounds normal    Abdominal: Soft  Bowel sounds are normal  There is no tenderness  Musculoskeletal: He exhibits no edema  Lymphadenopathy:     He has no cervical adenopathy  Neurological: He is alert and oriented to person, place, and time  No cranial nerve deficit  Skin: Skin is warm and dry  Psychiatric: He has a normal mood and affect  BMI Counseling: Body mass index is 28 89 kg/m²  Discussed the patient's BMI with him  The BMI is above average  BMI counseling and education was provided to the patient  Nutrition recommendations include reducing intake of cholesterol  Exercise recommendations include exercising 3-5 times per week

## 2019-02-12 ENCOUNTER — HOSPITAL ENCOUNTER (OUTPATIENT)
Dept: RADIOLOGY | Facility: HOSPITAL | Age: 49
Discharge: HOME | End: 2019-02-12
Payer: COMMERCIAL

## 2019-02-12 DIAGNOSIS — K22.70 BARRETT'S ESOPHAGUS WITHOUT DYSPLASIA: ICD-10-CM

## 2019-02-12 DIAGNOSIS — K21.9 GASTROESOPHAGEAL REFLUX DISEASE, ESOPHAGITIS PRESENCE NOT SPECIFIED: ICD-10-CM

## 2019-02-12 PROCEDURE — 74220 X-RAY XM ESOPHAGUS 1CNTRST: CPT

## 2019-05-21 DIAGNOSIS — B00.9 HERPES SIMPLEX TYPE 1 INFECTION: ICD-10-CM

## 2019-05-21 RX ORDER — VALACYCLOVIR HYDROCHLORIDE 500 MG/1
TABLET, FILM COATED ORAL
Qty: 30 TABLET | Refills: 5 | Status: SHIPPED | OUTPATIENT
Start: 2019-05-21 | End: 2019-10-29

## 2019-06-26 DIAGNOSIS — E78.5 HYPERLIPIDEMIA, UNSPECIFIED HYPERLIPIDEMIA TYPE: ICD-10-CM

## 2019-06-26 RX ORDER — ATORVASTATIN CALCIUM 10 MG/1
TABLET, FILM COATED ORAL
Qty: 30 TABLET | Refills: 11 | Status: SHIPPED | OUTPATIENT
Start: 2019-06-26 | End: 2020-08-06 | Stop reason: SDUPTHER

## 2019-10-09 ENCOUNTER — APPOINTMENT (OUTPATIENT)
Dept: LAB | Facility: CLINIC | Age: 49
End: 2019-10-09
Payer: COMMERCIAL

## 2019-10-09 DIAGNOSIS — K21.9 GASTROESOPHAGEAL REFLUX DISEASE, ESOPHAGITIS PRESENCE NOT SPECIFIED: ICD-10-CM

## 2019-10-09 DIAGNOSIS — Z00.00 HEALTH CARE MAINTENANCE: ICD-10-CM

## 2019-10-09 DIAGNOSIS — J30.9 ALLERGIC RHINITIS, UNSPECIFIED SEASONALITY, UNSPECIFIED TRIGGER: ICD-10-CM

## 2019-10-09 DIAGNOSIS — E78.5 HYPERLIPIDEMIA, UNSPECIFIED HYPERLIPIDEMIA TYPE: ICD-10-CM

## 2019-10-09 DIAGNOSIS — E55.9 VITAMIN D DEFICIENCY: ICD-10-CM

## 2019-10-09 DIAGNOSIS — B00.9 HERPES SIMPLEX TYPE 1 INFECTION: ICD-10-CM

## 2019-10-09 DIAGNOSIS — K22.70 BARRETT'S ESOPHAGUS WITHOUT DYSPLASIA: ICD-10-CM

## 2019-10-09 LAB
25(OH)D3 SERPL-MCNC: 48.7 NG/ML (ref 30–100)
ALBUMIN SERPL BCP-MCNC: 3.6 G/DL (ref 3.5–5)
ALP SERPL-CCNC: 94 U/L (ref 46–116)
ALT SERPL W P-5'-P-CCNC: 39 U/L (ref 12–78)
ANION GAP SERPL CALCULATED.3IONS-SCNC: 3 MMOL/L (ref 4–13)
AST SERPL W P-5'-P-CCNC: 25 U/L (ref 5–45)
BILIRUB SERPL-MCNC: 0.53 MG/DL (ref 0.2–1)
BUN SERPL-MCNC: 14 MG/DL (ref 5–25)
CALCIUM SERPL-MCNC: 8.9 MG/DL (ref 8.3–10.1)
CHLORIDE SERPL-SCNC: 106 MMOL/L (ref 100–108)
CHOLEST SERPL-MCNC: 217 MG/DL (ref 50–200)
CO2 SERPL-SCNC: 29 MMOL/L (ref 21–32)
CREAT SERPL-MCNC: 0.98 MG/DL (ref 0.6–1.3)
GFR SERPL CREATININE-BSD FRML MDRD: 90 ML/MIN/1.73SQ M
GLUCOSE P FAST SERPL-MCNC: 96 MG/DL (ref 65–99)
HDLC SERPL-MCNC: 57 MG/DL (ref 40–60)
LDLC SERPL CALC-MCNC: 109 MG/DL (ref 0–100)
POTASSIUM SERPL-SCNC: 3.9 MMOL/L (ref 3.5–5.3)
PROT SERPL-MCNC: 7.6 G/DL (ref 6.4–8.2)
SODIUM SERPL-SCNC: 138 MMOL/L (ref 136–145)
TRIGL SERPL-MCNC: 256 MG/DL

## 2019-10-09 PROCEDURE — 80053 COMPREHEN METABOLIC PANEL: CPT

## 2019-10-09 PROCEDURE — 80061 LIPID PANEL: CPT

## 2019-10-09 PROCEDURE — 36415 COLL VENOUS BLD VENIPUNCTURE: CPT

## 2019-10-09 PROCEDURE — 82306 VITAMIN D 25 HYDROXY: CPT

## 2019-10-29 ENCOUNTER — OFFICE VISIT (OUTPATIENT)
Dept: FAMILY MEDICINE CLINIC | Facility: CLINIC | Age: 49
End: 2019-10-29
Payer: COMMERCIAL

## 2019-10-29 VITALS
DIASTOLIC BLOOD PRESSURE: 96 MMHG | HEIGHT: 72 IN | HEART RATE: 76 BPM | RESPIRATION RATE: 18 BRPM | SYSTOLIC BLOOD PRESSURE: 142 MMHG | WEIGHT: 217 LBS | BODY MASS INDEX: 29.39 KG/M2 | TEMPERATURE: 97 F

## 2019-10-29 DIAGNOSIS — K22.70 BARRETT'S ESOPHAGUS WITHOUT DYSPLASIA: ICD-10-CM

## 2019-10-29 DIAGNOSIS — K21.9 GASTROESOPHAGEAL REFLUX DISEASE, ESOPHAGITIS PRESENCE NOT SPECIFIED: ICD-10-CM

## 2019-10-29 DIAGNOSIS — R07.9 CHEST PAIN, UNSPECIFIED TYPE: ICD-10-CM

## 2019-10-29 DIAGNOSIS — R07.81 RIB PAIN ON LEFT SIDE: ICD-10-CM

## 2019-10-29 DIAGNOSIS — Z00.00 HEALTH CARE MAINTENANCE: ICD-10-CM

## 2019-10-29 DIAGNOSIS — Z23 ENCOUNTER FOR IMMUNIZATION: Primary | ICD-10-CM

## 2019-10-29 PROCEDURE — 93000 ELECTROCARDIOGRAM COMPLETE: CPT | Performed by: FAMILY MEDICINE

## 2019-10-29 PROCEDURE — 90686 IIV4 VACC NO PRSV 0.5 ML IM: CPT | Performed by: FAMILY MEDICINE

## 2019-10-29 PROCEDURE — 99214 OFFICE O/P EST MOD 30 MIN: CPT | Performed by: FAMILY MEDICINE

## 2019-10-29 PROCEDURE — 3008F BODY MASS INDEX DOCD: CPT | Performed by: FAMILY MEDICINE

## 2019-10-29 PROCEDURE — 90471 IMMUNIZATION ADMIN: CPT | Performed by: FAMILY MEDICINE

## 2019-10-29 RX ORDER — OMEPRAZOLE 40 MG/1
40 CAPSULE, DELAYED RELEASE ORAL DAILY
Qty: 30 CAPSULE | Refills: 5 | Status: SHIPPED | OUTPATIENT
Start: 2019-10-29 | End: 2020-08-06

## 2019-10-29 NOTE — PROGRESS NOTES
Assessment and Plan:  1  Chest pain 2  Left leg pain, EKG shows no acute changes and no change from previous  Will check rib x-ray chest x-ray  Patient use ice and Tylenol   3  Brennan's esophagus 4  GERD, will check barium swallow  Increase omeprazole to 40 mg daily  5  Health care maintenance, influenza vaccine given today  6  Patient to return in 2 weeks for recheck  If worsen report to [facility] Emergency Department    Problem List Items Addressed This Visit        Digestive    Brennan esophagus      Barium swallow ordered         Relevant Orders    FL barium swallow    XR ribs left w pa chest min 3 views    GERD (gastroesophageal reflux disease)      Barium swallow ordered         Relevant Orders    FL barium swallow    XR ribs left w pa chest min 3 views       Other    Health care maintenance      Influenza vaccine given           Other Visit Diagnoses     Encounter for immunization    -  Primary    Relevant Orders    influenza vaccine, [id number], quadrivalent, 0 5 mL, preservative-free, for adult and pediatric patients 6 mos+ (AFLURIA, FLUARIX, FLULAVAL, FLUZONE)    Chest pain, unspecified type        Relevant Orders    FL barium swallow    XR ribs left w pa chest min 3 views    Rib pain on left side        Relevant Orders    FL barium swallow    XR ribs left w pa chest min 3 views                 Diagnoses and all orders for this visit:    Encounter for immunization  -     influenza vaccine, [id number], quadrivalent, 0 5 mL, preservative-free, for adult and pediatric patients 6 mos+ (AFLURIA, FLUARIX, FLULAVAL, FLUZONE)    Chest pain, unspecified type  -     FL barium swallow; Future  -     XR ribs left w pa chest min 3 views; Future    Brennan's esophagus without dysplasia  -     FL barium swallow; Future  -     XR ribs left w pa chest min 3 views; Future    Gastroesophageal reflux disease, esophagitis presence not specified  -     FL barium swallow;  Future  -     XR ribs left w pa chest min 3 views; Future    Rib pain on left side  -     FL barium swallow; Future  -     XR ribs left w pa chest min 3 views; Future    Health care maintenance              Subjective:      Patient ID: Roger Boston is a 52 y o  male  CC:    Chief Complaint   Patient presents with    Sternum Pain     Patient present today for a dull pain on his right next to his sternum for the past week  Per patient it is getting worse and he stated it might be reflux related  Pt has history of Esophagogastric Fundoplasty 3 years ago  HPI:     The past 2 weeks patient has had chest pain mainly left-sided anterior chest wall  No substernal chest pain no shortness of breath nausea vomiting palpitations diaphoresis  Patient denies any history of trauma to the area  No shortness of breath a rapid pulse      The following portions of the patient's history were reviewed and updated as appropriate: allergies, current medications, past family history, past medical history, past social history, past surgical history and problem list       Review of Systems   Constitutional: Negative for chills and fever  HENT: Negative  Eyes: Negative  Respiratory:         HPI   Cardiovascular:         HPI   Gastrointestinal:         HPI   Endocrine: Negative  Genitourinary: Negative  Musculoskeletal:         HPI   Skin: Negative for rash  Allergic/Immunologic: Negative  Neurological: Negative  Psychiatric/Behavioral: Negative  Data to review:       Objective:    Vitals:    10/29/19 1312   BP: 142/96   BP Location: Left arm   Patient Position: Sitting   Cuff Size: Standard   Pulse: 76   Resp: 18   Temp: (!) 97 °F (36 1 °C)   TempSrc: Temporal   Weight: 98 4 kg (217 lb)   Height: 6' (1 829 m)        Physical Exam   Constitutional: He is oriented to person, place, and time  He appears well-developed and well-nourished  HENT:   Head: Normocephalic and atraumatic     Mouth/Throat: Oropharynx is clear and moist  Eyes: Pupils are equal, round, and reactive to light  Conjunctivae and EOM are normal  No scleral icterus  Neck: Neck supple  No JVD present  Cardiovascular: Normal rate, regular rhythm and normal heart sounds  Pulmonary/Chest: Effort normal and breath sounds normal  He exhibits tenderness  Between 8-9 rib anterior sternal attachment mild pain negative deformity negative crepitus   Abdominal: Soft  Bowel sounds are normal  He exhibits no distension and no mass  There is no tenderness  There is no rebound and no guarding  Musculoskeletal: He exhibits tenderness  He exhibits no edema or deformity  Chest wall tenderness see pulmonary   Neurological: He is alert and oriented to person, place, and time  No cranial nerve deficit  Skin: Skin is warm and dry  Psychiatric: He has a normal mood and affect

## 2019-10-29 NOTE — PATIENT INSTRUCTIONS
Complete rib/chest x-ray and barium swallow  Increase omeprazole to 40 mg daily  Patient use ice and Tylenol for the pain    If pain worsen report to [facility] Emergency Department

## 2020-01-10 ENCOUNTER — LAB REQUISITION (OUTPATIENT)
Dept: LAB | Facility: HOSPITAL | Age: 50
End: 2020-01-10
Payer: COMMERCIAL

## 2020-01-10 DIAGNOSIS — K22.70 BARRETT'S ESOPHAGUS WITHOUT DYSPLASIA: ICD-10-CM

## 2020-01-10 PROCEDURE — 88305 TISSUE EXAM BY PATHOLOGIST: CPT | Performed by: PATHOLOGY

## 2020-04-19 ENCOUNTER — APPOINTMENT (EMERGENCY)
Dept: CT IMAGING | Facility: HOSPITAL | Age: 50
End: 2020-04-19
Payer: COMMERCIAL

## 2020-04-19 ENCOUNTER — HOSPITAL ENCOUNTER (EMERGENCY)
Facility: HOSPITAL | Age: 50
Discharge: HOME | End: 2020-04-19
Attending: EMERGENCY MEDICINE | Admitting: EMERGENCY MEDICINE
Payer: COMMERCIAL

## 2020-04-19 ENCOUNTER — APPOINTMENT (EMERGENCY)
Dept: RADIOLOGY | Facility: HOSPITAL | Age: 50
End: 2020-04-19
Payer: COMMERCIAL

## 2020-04-19 VITALS
HEART RATE: 93 BPM | BODY MASS INDEX: 29.42 KG/M2 | SYSTOLIC BLOOD PRESSURE: 152 MMHG | DIASTOLIC BLOOD PRESSURE: 88 MMHG | TEMPERATURE: 98.6 F | OXYGEN SATURATION: 97 % | WEIGHT: 216.93 LBS | RESPIRATION RATE: 14 BRPM
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
## 2020-04-19 DIAGNOSIS — S42.209A PROXIMAL HUMERUS FRACTURE: Primary | ICD-10-CM

## 2020-04-19 LAB
ALBUMIN SERPL BCP-MCNC: 4 G/DL (ref 3.5–5)
ALP SERPL-CCNC: 97 U/L (ref 46–116)
ALT SERPL W P-5'-P-CCNC: 56 U/L (ref 12–78)
ANION GAP SERPL CALCULATED.3IONS-SCNC: 9 MMOL/L (ref 4–13)
AST SERPL W P-5'-P-CCNC: 33 U/L (ref 5–45)
BASOPHILS # BLD AUTO: 0.08 THOUSANDS/ΜL (ref 0–0.1)
BASOPHILS NFR BLD AUTO: 1 % (ref 0–1)
BILIRUB SERPL-MCNC: 0.3 MG/DL (ref 0.2–1)
BUN SERPL-MCNC: 18 MG/DL (ref 5–25)
CALCIUM SERPL-MCNC: 9.3 MG/DL (ref 8.3–10.1)
CHLORIDE SERPL-SCNC: 101 MMOL/L (ref 100–108)
CO2 SERPL-SCNC: 28 MMOL/L (ref 21–32)
CREAT SERPL-MCNC: 1.09 MG/DL (ref 0.6–1.3)
EOSINOPHIL # BLD AUTO: 0.21 THOUSAND/ΜL (ref 0–0.61)
EOSINOPHIL NFR BLD AUTO: 2 % (ref 0–6)
ERYTHROCYTE [DISTWIDTH] IN BLOOD BY AUTOMATED COUNT: 11.9 % (ref 11.6–15.1)
GFR SERPL CREATININE-BSD FRML MDRD: 79 ML/MIN/1.73SQ M
GLUCOSE SERPL-MCNC: 108 MG/DL (ref 65–140)
HCT VFR BLD AUTO: 42.2 % (ref 36.5–49.3)
HGB BLD-MCNC: 14.1 G/DL (ref 12–17)
IMM GRANULOCYTES # BLD AUTO: 0.04 THOUSAND/UL (ref 0–0.2)
IMM GRANULOCYTES NFR BLD AUTO: 0 % (ref 0–2)
LYMPHOCYTES # BLD AUTO: 2.25 THOUSANDS/ΜL (ref 0.6–4.47)
LYMPHOCYTES NFR BLD AUTO: 23 % (ref 14–44)
MCH RBC QN AUTO: 31.8 PG (ref 26.8–34.3)
MCHC RBC AUTO-ENTMCNC: 33.4 G/DL (ref 31.4–37.4)
MCV RBC AUTO: 95 FL (ref 82–98)
MONOCYTES # BLD AUTO: 0.71 THOUSAND/ΜL (ref 0.17–1.22)
MONOCYTES NFR BLD AUTO: 7 % (ref 4–12)
NEUTROPHILS # BLD AUTO: 6.43 THOUSANDS/ΜL (ref 1.85–7.62)
NEUTS SEG NFR BLD AUTO: 67 % (ref 43–75)
NRBC BLD AUTO-RTO: 0 /100 WBCS
PLATELET # BLD AUTO: 439 THOUSANDS/UL (ref 149–390)
PMV BLD AUTO: 8.7 FL (ref 8.9–12.7)
POTASSIUM SERPL-SCNC: 3.7 MMOL/L (ref 3.5–5.3)
PROT SERPL-MCNC: 7.8 G/DL (ref 6.4–8.2)
RBC # BLD AUTO: 4.43 MILLION/UL (ref 3.88–5.62)
SODIUM SERPL-SCNC: 138 MMOL/L (ref 136–145)
WBC # BLD AUTO: 9.72 THOUSAND/UL (ref 4.31–10.16)

## 2020-04-19 PROCEDURE — 29105 APPLICATION LONG ARM SPLINT: CPT | Performed by: EMERGENCY MEDICINE

## 2020-04-19 PROCEDURE — 96375 TX/PRO/DX INJ NEW DRUG ADDON: CPT

## 2020-04-19 PROCEDURE — 36415 COLL VENOUS BLD VENIPUNCTURE: CPT | Performed by: EMERGENCY MEDICINE

## 2020-04-19 PROCEDURE — 72125 CT NECK SPINE W/O DYE: CPT

## 2020-04-19 PROCEDURE — 70450 CT HEAD/BRAIN W/O DYE: CPT

## 2020-04-19 PROCEDURE — 99284 EMERGENCY DEPT VISIT MOD MDM: CPT

## 2020-04-19 PROCEDURE — 96374 THER/PROPH/DIAG INJ IV PUSH: CPT

## 2020-04-19 PROCEDURE — 73060 X-RAY EXAM OF HUMERUS: CPT

## 2020-04-19 PROCEDURE — 71260 CT THORAX DX C+: CPT

## 2020-04-19 PROCEDURE — 85025 COMPLETE CBC W/AUTO DIFF WBC: CPT | Performed by: EMERGENCY MEDICINE

## 2020-04-19 PROCEDURE — 96361 HYDRATE IV INFUSION ADD-ON: CPT

## 2020-04-19 PROCEDURE — 80053 COMPREHEN METABOLIC PANEL: CPT | Performed by: EMERGENCY MEDICINE

## 2020-04-19 PROCEDURE — 99285 EMERGENCY DEPT VISIT HI MDM: CPT | Performed by: EMERGENCY MEDICINE

## 2020-04-19 PROCEDURE — 74177 CT ABD & PELVIS W/CONTRAST: CPT

## 2020-04-19 RX ORDER — NAPROXEN 500 MG/1
500 TABLET ORAL 2 TIMES DAILY WITH MEALS
Qty: 14 TABLET | Refills: 0 | Status: SHIPPED | OUTPATIENT
Start: 2020-04-19 | End: 2021-11-03

## 2020-04-19 RX ORDER — FENTANYL CITRATE 50 UG/ML
50 INJECTION, SOLUTION INTRAMUSCULAR; INTRAVENOUS ONCE
Status: COMPLETED | OUTPATIENT
Start: 2020-04-19 | End: 2020-04-19

## 2020-04-19 RX ORDER — ONDANSETRON 2 MG/ML
4 INJECTION INTRAMUSCULAR; INTRAVENOUS ONCE
Status: COMPLETED | OUTPATIENT
Start: 2020-04-19 | End: 2020-04-19

## 2020-04-19 RX ORDER — FAMOTIDINE 20 MG/1
20 TABLET, FILM COATED ORAL 2 TIMES DAILY
Qty: 14 TABLET | Refills: 0 | Status: SHIPPED | OUTPATIENT
Start: 2020-04-19 | End: 2021-11-03

## 2020-04-19 RX ADMIN — SODIUM CHLORIDE 1000 ML: 0.9 INJECTION, SOLUTION INTRAVENOUS at 13:34

## 2020-04-19 RX ADMIN — IOHEXOL 100 ML: 350 INJECTION, SOLUTION INTRAVENOUS at 14:08

## 2020-04-19 RX ADMIN — ONDANSETRON 4 MG: 2 INJECTION INTRAMUSCULAR; INTRAVENOUS at 13:36

## 2020-04-19 RX ADMIN — FENTANYL CITRATE 50 MCG: 50 INJECTION, SOLUTION INTRAMUSCULAR; INTRAVENOUS at 13:39

## 2020-07-25 DIAGNOSIS — E78.5 HYPERLIPIDEMIA, UNSPECIFIED HYPERLIPIDEMIA TYPE: ICD-10-CM

## 2020-07-25 RX ORDER — ATORVASTATIN CALCIUM 10 MG/1
TABLET, FILM COATED ORAL
Qty: 30 TABLET | Refills: 11 | OUTPATIENT
Start: 2020-07-25

## 2020-07-30 ENCOUNTER — TELEPHONE (OUTPATIENT)
Dept: FAMILY MEDICINE CLINIC | Facility: CLINIC | Age: 50
End: 2020-07-30

## 2020-07-30 DIAGNOSIS — E55.9 VITAMIN D DEFICIENCY: ICD-10-CM

## 2020-07-30 DIAGNOSIS — K21.9 GASTROESOPHAGEAL REFLUX DISEASE, ESOPHAGITIS PRESENCE NOT SPECIFIED: ICD-10-CM

## 2020-07-30 DIAGNOSIS — R53.83 FATIGUE, UNSPECIFIED TYPE: ICD-10-CM

## 2020-07-30 DIAGNOSIS — E78.5 HYPERLIPIDEMIA, UNSPECIFIED HYPERLIPIDEMIA TYPE: Primary | ICD-10-CM

## 2020-08-04 ENCOUNTER — APPOINTMENT (OUTPATIENT)
Dept: LAB | Facility: CLINIC | Age: 50
End: 2020-08-04
Payer: COMMERCIAL

## 2020-08-04 DIAGNOSIS — E78.5 HYPERLIPIDEMIA, UNSPECIFIED HYPERLIPIDEMIA TYPE: ICD-10-CM

## 2020-08-04 DIAGNOSIS — E55.9 VITAMIN D DEFICIENCY: ICD-10-CM

## 2020-08-04 DIAGNOSIS — R53.83 FATIGUE, UNSPECIFIED TYPE: ICD-10-CM

## 2020-08-04 DIAGNOSIS — K21.9 GASTROESOPHAGEAL REFLUX DISEASE, ESOPHAGITIS PRESENCE NOT SPECIFIED: ICD-10-CM

## 2020-08-04 LAB
25(OH)D3 SERPL-MCNC: 59.9 NG/ML (ref 30–100)
ALBUMIN SERPL BCP-MCNC: 3.5 G/DL (ref 3.5–5)
ALP SERPL-CCNC: 96 U/L (ref 46–116)
ALT SERPL W P-5'-P-CCNC: 41 U/L (ref 12–78)
ANION GAP SERPL CALCULATED.3IONS-SCNC: 5 MMOL/L (ref 4–13)
AST SERPL W P-5'-P-CCNC: 24 U/L (ref 5–45)
BACTERIA UR QL AUTO: ABNORMAL /HPF
BILIRUB SERPL-MCNC: 0.53 MG/DL (ref 0.2–1)
BILIRUB UR QL STRIP: NEGATIVE
BUN SERPL-MCNC: 17 MG/DL (ref 5–25)
CALCIUM SERPL-MCNC: 9 MG/DL (ref 8.3–10.1)
CHLORIDE SERPL-SCNC: 108 MMOL/L (ref 100–108)
CHOLEST SERPL-MCNC: 213 MG/DL (ref 50–200)
CLARITY UR: CLEAR
CO2 SERPL-SCNC: 27 MMOL/L (ref 21–32)
COLOR UR: YELLOW
CREAT SERPL-MCNC: 0.88 MG/DL (ref 0.6–1.3)
ERYTHROCYTE [DISTWIDTH] IN BLOOD BY AUTOMATED COUNT: 12.4 % (ref 11.6–15.1)
GFR SERPL CREATININE-BSD FRML MDRD: 101 ML/MIN/1.73SQ M
GLUCOSE P FAST SERPL-MCNC: 85 MG/DL (ref 65–99)
GLUCOSE UR STRIP-MCNC: NEGATIVE MG/DL
HCT VFR BLD AUTO: 43.1 % (ref 36.5–49.3)
HDLC SERPL-MCNC: 50 MG/DL
HGB BLD-MCNC: 14.3 G/DL (ref 12–17)
HGB UR QL STRIP.AUTO: NEGATIVE
HYALINE CASTS #/AREA URNS LPF: ABNORMAL /LPF
KETONES UR STRIP-MCNC: NEGATIVE MG/DL
LDLC SERPL CALC-MCNC: 129 MG/DL (ref 0–100)
LEUKOCYTE ESTERASE UR QL STRIP: NEGATIVE
MCH RBC QN AUTO: 32.3 PG (ref 26.8–34.3)
MCHC RBC AUTO-ENTMCNC: 33.2 G/DL (ref 31.4–37.4)
MCV RBC AUTO: 97 FL (ref 82–98)
NITRITE UR QL STRIP: NEGATIVE
NON-SQ EPI CELLS URNS QL MICRO: ABNORMAL /HPF
PH UR STRIP.AUTO: 6 [PH]
PLATELET # BLD AUTO: 420 THOUSANDS/UL (ref 149–390)
PMV BLD AUTO: 9.4 FL (ref 8.9–12.7)
POTASSIUM SERPL-SCNC: 4.1 MMOL/L (ref 3.5–5.3)
PROT SERPL-MCNC: 7.3 G/DL (ref 6.4–8.2)
PROT UR STRIP-MCNC: ABNORMAL MG/DL
RBC # BLD AUTO: 4.43 MILLION/UL (ref 3.88–5.62)
RBC #/AREA URNS AUTO: ABNORMAL /HPF
SODIUM SERPL-SCNC: 140 MMOL/L (ref 136–145)
SP GR UR STRIP.AUTO: 1.02 (ref 1–1.03)
TRIGL SERPL-MCNC: 168 MG/DL
TSH SERPL DL<=0.05 MIU/L-ACNC: 2.15 UIU/ML (ref 0.36–3.74)
UROBILINOGEN UR QL STRIP.AUTO: 0.2 E.U./DL
WBC # BLD AUTO: 7.39 THOUSAND/UL (ref 4.31–10.16)
WBC #/AREA URNS AUTO: ABNORMAL /HPF

## 2020-08-04 PROCEDURE — 85027 COMPLETE CBC AUTOMATED: CPT

## 2020-08-04 PROCEDURE — 82306 VITAMIN D 25 HYDROXY: CPT

## 2020-08-04 PROCEDURE — 80053 COMPREHEN METABOLIC PANEL: CPT

## 2020-08-04 PROCEDURE — 80061 LIPID PANEL: CPT

## 2020-08-04 PROCEDURE — 81001 URINALYSIS AUTO W/SCOPE: CPT

## 2020-08-04 PROCEDURE — 36415 COLL VENOUS BLD VENIPUNCTURE: CPT

## 2020-08-04 PROCEDURE — 84443 ASSAY THYROID STIM HORMONE: CPT

## 2020-08-06 ENCOUNTER — OFFICE VISIT (OUTPATIENT)
Dept: FAMILY MEDICINE CLINIC | Facility: CLINIC | Age: 50
End: 2020-08-06
Payer: COMMERCIAL

## 2020-08-06 VITALS
TEMPERATURE: 98 F | SYSTOLIC BLOOD PRESSURE: 128 MMHG | DIASTOLIC BLOOD PRESSURE: 76 MMHG | HEART RATE: 72 BPM | HEIGHT: 72 IN | WEIGHT: 209 LBS | RESPIRATION RATE: 16 BRPM | BODY MASS INDEX: 28.31 KG/M2

## 2020-08-06 DIAGNOSIS — K21.9 GASTROESOPHAGEAL REFLUX DISEASE, ESOPHAGITIS PRESENCE NOT SPECIFIED: ICD-10-CM

## 2020-08-06 DIAGNOSIS — E55.9 VITAMIN D DEFICIENCY: ICD-10-CM

## 2020-08-06 DIAGNOSIS — E78.5 HYPERLIPIDEMIA, UNSPECIFIED HYPERLIPIDEMIA TYPE: ICD-10-CM

## 2020-08-06 DIAGNOSIS — R20.2 PARESTHESIA OF LEFT LEG: ICD-10-CM

## 2020-08-06 DIAGNOSIS — K22.70 BARRETT'S ESOPHAGUS WITHOUT DYSPLASIA: Primary | ICD-10-CM

## 2020-08-06 PROBLEM — R76.8 ELEVATED RHEUMATOID FACTOR: Status: RESOLVED | Noted: 2017-05-09 | Resolved: 2020-08-06

## 2020-08-06 PROCEDURE — 3008F BODY MASS INDEX DOCD: CPT | Performed by: FAMILY MEDICINE

## 2020-08-06 PROCEDURE — 3725F SCREEN DEPRESSION PERFORMED: CPT | Performed by: FAMILY MEDICINE

## 2020-08-06 PROCEDURE — 1036F TOBACCO NON-USER: CPT | Performed by: FAMILY MEDICINE

## 2020-08-06 PROCEDURE — 99214 OFFICE O/P EST MOD 30 MIN: CPT | Performed by: FAMILY MEDICINE

## 2020-08-06 RX ORDER — ATORVASTATIN CALCIUM 10 MG/1
10 TABLET, FILM COATED ORAL DAILY
Qty: 30 TABLET | Refills: 11 | Status: SHIPPED | OUTPATIENT
Start: 2020-08-06 | End: 2021-09-29

## 2020-08-06 NOTE — PATIENT INSTRUCTIONS
Loosen belt and use vitamin-B complex daily    If left thigh still with any symptomatology remaining next 2-3 weeks return to office  Continue low-fat diet diet exercise  Return in 6 months for office visit blood work

## 2020-08-06 NOTE — PROGRESS NOTES
Assessment and Plan:  1  Hyperlipidemia, stable atorvastatin 10 mg reordered  2  Brennan's esophagus, stable follow-up Gastroenterology per their instructions  3  GERD, stable status post Carlos fundoplication is on Pepcid p r n   4  Vitamin-D deficiency, stable off of vitamin-D 09307 units prescription  5  Paresthesia left anterior thigh  Question meralgia paresthetica verses mild neuritis  Patient is to lose his belt  Use vitamin-B complex daily  Return in 2-3 weeks if still symptoms  6  BMI 28 35  Diet exercise weight loss recommended patient did lose 7 lb since last office visit  7   Return in 6 months for office visit blood work         Problem List Items Addressed This Visit        Digestive    Brennan esophagus - Primary     Using Pepcid follow-up with Gastroenterology per their instructions         Relevant Orders    CBC    Comprehensive metabolic panel    Lipid Panel with Direct LDL reflex    UA (URINE) with reflex to Scope    Vitamin D 25 hydroxy    GERD (gastroesophageal reflux disease)     Stable on Pepcid status post Carlos fundoplication         Relevant Orders    CBC    Comprehensive metabolic panel    Lipid Panel with Direct LDL reflex    UA (URINE) with reflex to Scope    Vitamin D 25 hydroxy       Other    Hyperlipidemia     Stable continue atorvastatin 10 mg daily low-fat diet recommended         Relevant Medications    atorvastatin (LIPITOR) 10 mg tablet    Other Relevant Orders    CBC    Comprehensive metabolic panel    Lipid Panel with Direct LDL reflex    UA (URINE) with reflex to Scope    Vitamin D 25 hydroxy    Vitamin D deficiency     Stable patient is off of his vitamin-D 17916 units         Relevant Orders    CBC    Comprehensive metabolic panel    Lipid Panel with Direct LDL reflex    UA (URINE) with reflex to Scope    Vitamin D 25 hydroxy      Other Visit Diagnoses     Paresthesia of left leg        Relevant Orders    CBC    Comprehensive metabolic panel    Lipid Panel with Direct LDL reflex    UA (URINE) with reflex to Scope    Vitamin D 25 hydroxy                 Diagnoses and all orders for this visit:    Brennan's esophagus without dysplasia  -     CBC; Future  -     Comprehensive metabolic panel; Future  -     Lipid Panel with Direct LDL reflex; Future  -     UA (URINE) with reflex to Scope; Future  -     Vitamin D 25 hydroxy; Future    Hyperlipidemia, unspecified hyperlipidemia type  -     atorvastatin (LIPITOR) 10 mg tablet; Take 1 tablet (10 mg total) by mouth daily  -     CBC; Future  -     Comprehensive metabolic panel; Future  -     Lipid Panel with Direct LDL reflex; Future  -     UA (URINE) with reflex to Scope; Future  -     Vitamin D 25 hydroxy; Future    Gastroesophageal reflux disease, esophagitis presence not specified  -     CBC; Future  -     Comprehensive metabolic panel; Future  -     Lipid Panel with Direct LDL reflex; Future  -     UA (URINE) with reflex to Scope; Future  -     Vitamin D 25 hydroxy; Future    Vitamin D deficiency  -     CBC; Future  -     Comprehensive metabolic panel; Future  -     Lipid Panel with Direct LDL reflex; Future  -     UA (URINE) with reflex to Scope; Future  -     Vitamin D 25 hydroxy; Future    Paresthesia of left leg  -     CBC; Future  -     Comprehensive metabolic panel; Future  -     Lipid Panel with Direct LDL reflex; Future  -     UA (URINE) with reflex to Scope; Future  -     Vitamin D 25 hydroxy; Future    Other orders  -     b complex vitamins tablet; Take 1 tablet by mouth daily              Subjective:      Patient ID: Dwight Rosales is a 52 y o  male  CC:    Chief Complaint   Patient presents with    Follow-up     pt here for a follow up and to review lab results  PREET Gomez       HPI:    Patient doing well at the present time is lost 7 lb    Patient tells me 4 months ago he did fracture his right shoulder he is doing well out of the splint he is following up with orthopedics and has no restrictions at the present time blood work was discussed  The following portions of the patient's history were reviewed and updated as appropriate: allergies, current medications, past family history, past medical history, past social history, past surgical history and problem list       Review of Systems   Constitutional: Negative  HENT: Negative  Eyes: Negative  Respiratory: Negative  Cardiovascular: Negative  Gastrointestinal: Negative  Endocrine: Negative  Genitourinary: Negative  Musculoskeletal:        HPI   Skin: Negative  Allergic/Immunologic: Negative  Neurological:        Patient does note occasionally the anterior left thigh feels "numb" no weakness or rash   Hematological: Negative  Psychiatric/Behavioral: Negative  Data to review:       Objective:    Vitals:    08/06/20 1823   BP: 128/76   BP Location: Left arm   Patient Position: Sitting   Cuff Size: Large   Pulse: 72   Resp: 16   Temp: 98 °F (36 7 °C)   TempSrc: Temporal   Weight: 94 8 kg (209 lb)   Height: 6' (1 829 m)        Physical Exam   Constitutional: He is oriented to person, place, and time  HENT:   Head: Normocephalic and atraumatic  Eyes: No scleral icterus  Neck: Normal range of motion  Neck supple  No muscular tenderness present  Carotid bruit is not present  No neck rigidity  Cardiovascular: Normal rate, regular rhythm and normal heart sounds  Pulmonary/Chest: Effort normal and breath sounds normal    Abdominal: Soft  Normal appearance and bowel sounds are normal  There is no abdominal tenderness  Musculoskeletal:      Right lower leg: No edema  Left lower leg: No edema  Comments: Left thigh without tenderness rash   Lymphadenopathy:     He has no cervical adenopathy  Neurological: He is alert and oriented to person, place, and time  He displays no weakness  No cranial nerve deficit or sensory deficit  Coordination normal    Skin: Skin is warm and dry  No rash noted     Psychiatric: Mood normal  BMI Counseling: Body mass index is 28 35 kg/m²  The BMI is above normal  Nutrition recommendations include moderation in carbohydrate intake and reducing intake of cholesterol  Exercise recommendations include exercising 3-5 times per week

## 2020-12-15 ENCOUNTER — TELEMEDICINE (OUTPATIENT)
Dept: FAMILY MEDICINE CLINIC | Facility: CLINIC | Age: 50
End: 2020-12-15
Payer: COMMERCIAL

## 2020-12-15 DIAGNOSIS — R09.81 HEAD CONGESTION: ICD-10-CM

## 2020-12-15 DIAGNOSIS — R05.9 COUGH: ICD-10-CM

## 2020-12-15 DIAGNOSIS — J30.9 ALLERGIC RHINITIS, UNSPECIFIED SEASONALITY, UNSPECIFIED TRIGGER: ICD-10-CM

## 2020-12-15 DIAGNOSIS — J02.9 SORE THROAT: ICD-10-CM

## 2020-12-15 DIAGNOSIS — R09.89 CHEST CONGESTION: ICD-10-CM

## 2020-12-15 DIAGNOSIS — M79.10 MYALGIA: ICD-10-CM

## 2020-12-15 DIAGNOSIS — J01.90 ACUTE SINUSITIS, RECURRENCE NOT SPECIFIED, UNSPECIFIED LOCATION: ICD-10-CM

## 2020-12-15 DIAGNOSIS — J02.9 SORE THROAT: Primary | ICD-10-CM

## 2020-12-15 PROCEDURE — U0003 INFECTIOUS AGENT DETECTION BY NUCLEIC ACID (DNA OR RNA); SEVERE ACUTE RESPIRATORY SYNDROME CORONAVIRUS 2 (SARS-COV-2) (CORONAVIRUS DISEASE [COVID-19]), AMPLIFIED PROBE TECHNIQUE, MAKING USE OF HIGH THROUGHPUT TECHNOLOGIES AS DESCRIBED BY CMS-2020-01-R: HCPCS | Performed by: FAMILY MEDICINE

## 2020-12-15 PROCEDURE — 99214 OFFICE O/P EST MOD 30 MIN: CPT | Performed by: FAMILY MEDICINE

## 2020-12-15 RX ORDER — DOXYCYCLINE HYCLATE 100 MG/1
100 CAPSULE ORAL EVERY 12 HOURS SCHEDULED
Qty: 20 CAPSULE | Refills: 0 | Status: SHIPPED | OUTPATIENT
Start: 2020-12-15 | End: 2020-12-25

## 2020-12-15 RX ORDER — METHYLPREDNISOLONE 4 MG/1
TABLET ORAL
Qty: 1 EACH | Refills: 0 | Status: SHIPPED | OUTPATIENT
Start: 2020-12-15 | End: 2020-12-21

## 2020-12-15 RX ORDER — BENZONATATE 200 MG/1
200 CAPSULE ORAL 3 TIMES DAILY PRN
Qty: 30 CAPSULE | Refills: 1 | Status: SHIPPED | OUTPATIENT
Start: 2020-12-15 | End: 2020-12-25

## 2020-12-15 RX ORDER — AZELASTINE 1 MG/ML
2 SPRAY, METERED NASAL 2 TIMES DAILY
Qty: 30 ML | Refills: 3 | Status: SHIPPED | OUTPATIENT
Start: 2020-12-15 | End: 2021-03-09

## 2020-12-16 ENCOUNTER — TELEMEDICINE (OUTPATIENT)
Dept: FAMILY MEDICINE CLINIC | Facility: CLINIC | Age: 50
End: 2020-12-16
Payer: COMMERCIAL

## 2020-12-16 DIAGNOSIS — U07.1 COVID-19 VIRUS INFECTION: Primary | ICD-10-CM

## 2020-12-16 LAB — SARS-COV-2 RNA SPEC QL NAA+PROBE: DETECTED

## 2020-12-16 PROCEDURE — 99213 OFFICE O/P EST LOW 20 MIN: CPT | Performed by: FAMILY MEDICINE

## 2020-12-18 ENCOUNTER — TELEMEDICINE (OUTPATIENT)
Dept: FAMILY MEDICINE CLINIC | Facility: CLINIC | Age: 50
End: 2020-12-18
Payer: COMMERCIAL

## 2020-12-18 DIAGNOSIS — U07.1 COVID-19 VIRUS INFECTION: Primary | ICD-10-CM

## 2020-12-18 PROCEDURE — 1036F TOBACCO NON-USER: CPT | Performed by: FAMILY MEDICINE

## 2020-12-18 PROCEDURE — 99213 OFFICE O/P EST LOW 20 MIN: CPT | Performed by: FAMILY MEDICINE

## 2020-12-18 RX ORDER — ALBUTEROL SULFATE 90 UG/1
2 AEROSOL, METERED RESPIRATORY (INHALATION) EVERY 4 HOURS PRN
Qty: 1 INHALER | Refills: 0 | Status: SHIPPED | OUTPATIENT
Start: 2020-12-18 | End: 2021-01-17

## 2020-12-21 ENCOUNTER — TELEMEDICINE (OUTPATIENT)
Dept: FAMILY MEDICINE CLINIC | Facility: CLINIC | Age: 50
End: 2020-12-21
Payer: COMMERCIAL

## 2020-12-21 DIAGNOSIS — U07.1 COVID-19 VIRUS INFECTION: Primary | ICD-10-CM

## 2020-12-21 PROCEDURE — 99213 OFFICE O/P EST LOW 20 MIN: CPT | Performed by: FAMILY MEDICINE

## 2020-12-22 ENCOUNTER — TELEMEDICINE (OUTPATIENT)
Dept: FAMILY MEDICINE CLINIC | Facility: CLINIC | Age: 50
End: 2020-12-22
Payer: COMMERCIAL

## 2020-12-22 DIAGNOSIS — U07.1 COVID-19 VIRUS INFECTION: Primary | ICD-10-CM

## 2020-12-22 PROCEDURE — 99213 OFFICE O/P EST LOW 20 MIN: CPT | Performed by: FAMILY MEDICINE

## 2020-12-22 RX ORDER — DEXTROMETHORPHAN HYDROBROMIDE AND PROMETHAZINE HYDROCHLORIDE 15; 6.25 MG/5ML; MG/5ML
5 SOLUTION ORAL 4 TIMES DAILY PRN
Qty: 240 ML | Refills: 0 | Status: SHIPPED | OUTPATIENT
Start: 2020-12-22 | End: 2021-11-03

## 2020-12-23 ENCOUNTER — TELEMEDICINE (OUTPATIENT)
Dept: FAMILY MEDICINE CLINIC | Facility: CLINIC | Age: 50
End: 2020-12-23
Payer: COMMERCIAL

## 2020-12-23 DIAGNOSIS — U07.1 COVID-19 VIRUS INFECTION: Primary | ICD-10-CM

## 2020-12-23 PROCEDURE — 99213 OFFICE O/P EST LOW 20 MIN: CPT | Performed by: FAMILY MEDICINE

## 2020-12-28 ENCOUNTER — TELEMEDICINE (OUTPATIENT)
Dept: FAMILY MEDICINE CLINIC | Facility: CLINIC | Age: 50
End: 2020-12-28
Payer: COMMERCIAL

## 2020-12-28 VITALS — TEMPERATURE: 99.8 F | OXYGEN SATURATION: 96 %

## 2020-12-28 DIAGNOSIS — U07.1 COVID-19 VIRUS INFECTION: Primary | ICD-10-CM

## 2020-12-28 PROCEDURE — 99213 OFFICE O/P EST LOW 20 MIN: CPT | Performed by: NURSE PRACTITIONER

## 2020-12-28 PROCEDURE — 1036F TOBACCO NON-USER: CPT | Performed by: NURSE PRACTITIONER

## 2020-12-30 ENCOUNTER — TELEMEDICINE (OUTPATIENT)
Dept: FAMILY MEDICINE CLINIC | Facility: CLINIC | Age: 50
End: 2020-12-30
Payer: COMMERCIAL

## 2020-12-30 VITALS — TEMPERATURE: 99 F

## 2020-12-30 DIAGNOSIS — U07.1 COVID-19 VIRUS INFECTION: Primary | ICD-10-CM

## 2020-12-30 PROCEDURE — 99213 OFFICE O/P EST LOW 20 MIN: CPT | Performed by: NURSE PRACTITIONER

## 2021-01-02 ENCOUNTER — OFFICE VISIT (OUTPATIENT)
Dept: URGENT CARE | Facility: MEDICAL CENTER | Age: 51
End: 2021-01-02
Payer: COMMERCIAL

## 2021-01-02 ENCOUNTER — APPOINTMENT (OUTPATIENT)
Dept: RADIOLOGY | Facility: MEDICAL CENTER | Age: 51
End: 2021-01-02
Payer: COMMERCIAL

## 2021-01-02 VITALS
DIASTOLIC BLOOD PRESSURE: 85 MMHG | SYSTOLIC BLOOD PRESSURE: 139 MMHG | HEART RATE: 103 BPM | OXYGEN SATURATION: 96 % | TEMPERATURE: 98.6 F | BODY MASS INDEX: 27.09 KG/M2 | RESPIRATION RATE: 20 BRPM | WEIGHT: 200 LBS | HEIGHT: 72 IN

## 2021-01-02 DIAGNOSIS — S93.401A SPRAIN OF RIGHT ANKLE, UNSPECIFIED LIGAMENT, INITIAL ENCOUNTER: ICD-10-CM

## 2021-01-02 DIAGNOSIS — S99.911A ANKLE INJURY, RIGHT, INITIAL ENCOUNTER: ICD-10-CM

## 2021-01-02 DIAGNOSIS — S99.911A ANKLE INJURY, RIGHT, INITIAL ENCOUNTER: Primary | ICD-10-CM

## 2021-01-02 PROCEDURE — 73610 X-RAY EXAM OF ANKLE: CPT

## 2021-01-02 PROCEDURE — 99213 OFFICE O/P EST LOW 20 MIN: CPT | Performed by: FAMILY MEDICINE

## 2021-01-02 NOTE — PROGRESS NOTES
[facility] [address] [address] Now        NAME: Karen Bowman is a 48 y o  male  : 1970    MRN: [id number]  DATE: 2021  TIME: 6:50 PM    Assessment and Plan   Ankle injury, right, initial encounter [D26 913S]  1  Ankle injury, right, initial encounter  XR ankle 3+ vw right   2  Sprain of right ankle, unspecified ligament, initial encounter           Patient Instructions       Follow up with PCP in 3-5 days  Proceed to  ER if symptoms worsen  Chief Complaint     Chief Complaint   Patient presents with    Ankle Pain     Patient states he started with some mild pain inhis R ankle yesterday  Today th epain is worse  IT is worse when he walks on it or puts any pressrue on it  Patient denies any trauma         History of Present Illness       51-year-old male here today with right ankle pain since yesterday  Denies any recent fall or trauma  He has some pain when he squeezes the medial aspect of his right ankle  He has also noticed some swelling of the medial and lateral aspect  Describes previous history of ankle sprain but cannot remember which ankle he sprain  He has been taking Tylenol with some mild improvement  He is able to bear weight  Review of Systems   Review of Systems   Constitutional: Negative  Musculoskeletal: Positive for arthralgias and joint swelling           Current Medications       Current Outpatient Medications:     atorvastatin (LIPITOR) 10 mg tablet, Take 1 tablet (10 mg total) by mouth daily, Disp: 30 tablet, Rfl: 11    azelastine (ASTELIN) 0 1 % nasal spray, 2 sprays into each nostril 2 (two) times a day Use in each nostril as directed, Disp: 30 mL, Rfl: 3    b complex vitamins tablet, Take 1 tablet by mouth daily, Disp: , Rfl:     albuterol (Ventolin HFA) 90 mcg/act inhaler, Inhale 2 puffs every 4 (four) hours as needed for wheezing or shortness of breath (cough) (Patient not taking: Reported on 2021), Disp: 1 Inhaler, Rfl: 0    famotidine (PEPCID) 20 mg tablet, Take 1 tablet (20 mg total) by mouth 2 (two) times a day for 7 days, Disp: 14 tablet, Rfl: 0    naproxen (NAPROSYN) 500 mg tablet, Take 1 tablet (500 mg total) by mouth 2 (two) times a day with meals (Patient not taking: Reported on 1/2/2021), Disp: 14 tablet, Rfl: 0    Promethazine-DM (PHENERGAN-DM) 6 25-15 mg/5 mL oral syrup, Take 5 mL by mouth 4 (four) times a day as needed for cough (Patient not taking: Reported on 1/2/2021), Disp: 240 mL, Rfl: 0    Current Allergies     Allergies as of 01/02/2021 - Reviewed 01/02/2021   Allergen Reaction Noted    Azithromycin GI Intolerance 06/20/2014    Penicillins  11/22/2008            The following portions of the patient's history were reviewed and updated as appropriate: allergies, current medications, past family history, past medical history, past social history, past surgical history and problem list      Past Medical History:   Diagnosis Date    COVID-19        Past Surgical History:   Procedure Laterality Date    DEEP NECK LYMPH NODE BIOPSY / EXCISION      cervical node biopsy with dissection of jugular nodes    ESOPHAGOGASTRIC FUNDOPLASTY      Nissen Fundoplication       Family History   Problem Relation Age of Onset    Heart attack Father         Anteroapical MI         Medications have been verified  Objective   /85   Pulse 103   Temp 98 6 °F (37 °C)   Resp 20   Ht 6' (1 829 m)   Wt 90 7 kg (200 lb)   SpO2 96%   BMI 27 12 kg/m²   No LMP for male patient  Physical Exam     Physical Exam  Vitals signs and nursing note reviewed  Constitutional:       Appearance: Normal appearance  Musculoskeletal:         General: Tenderness present  Comments: Right lower extremity:  Full range on plantar flexion dorsiflexion  Noticeable effusion of the medial and lateral malleolus, warm to touch but no evidence of ecchymosis  There is some pain on inversion of the ankle    There is good capillary refill and tactile sensation distal to the injury  Neurological:      Mental Status: He is alert

## 2021-01-02 NOTE — PATIENT INSTRUCTIONS
X-ray of right ankle reveals no fracture subluxation there is significant soft tissue swelling of the medial and lateral malleolus  Patient's right foot was wrapped with Ace wrap, placed in ankle air brace  Advised to apply ice when needed, keep right foot elevated, recommended ibuprofen  If pain persists or worsen after 7 days he is to consult with primary care provider  He expressed understanding  Ankle Sprain   WHAT YOU NEED TO KNOW:   An ankle sprain happens when 1 or more ligaments in your ankle joint stretch or tear  Ligaments are tough tissues that connect bones  Ligaments support your joints and keep your bones in place  DISCHARGE INSTRUCTIONS:   Return to the emergency department if:   · You have severe pain in your ankle  · Your foot or toes are cold or numb  · Your ankle becomes more weak or unstable (wobbly)  · You are unable to put any weight on your ankle or foot  · Your swelling has increased or returned  Call your doctor if:   · Your pain does not go away, even after treatment  · You have questions or concerns about your condition or care  Medicines: You may need any of the following:  · NSAIDs , such as ibuprofen, help decrease swelling, pain, and fever  This medicine is available with or without a doctor's order  NSAIDs can cause stomach bleeding or kidney problems in certain people  If you take blood thinner medicine, always ask your healthcare provider if NSAIDs are safe for you  Always read the medicine label and follow directions  · Acetaminophen  decreases pain and fever  It is available without a doctor's order  Ask how much to take and how often to take it  Follow directions  Read the labels of all other medicines you are using to see if they also contain acetaminophen, or ask your doctor or pharmacist  Acetaminophen can cause liver damage if not taken correctly  Do not use more than 4 grams (4,000 milligrams) total of acetaminophen in one day  · Prescription pain medicine  may be given  Ask your healthcare provider how to take this medicine safely  Some prescription pain medicines contain acetaminophen  Do not take other medicines that contain acetaminophen without talking to your healthcare provider  Too much acetaminophen may cause liver damage  Prescription pain medicine may cause constipation  Ask your healthcare provider how to prevent or treat constipation  · Take your medicine as directed  Contact your healthcare provider if you think your medicine is not helping or if you have side effects  Tell him or her if you are allergic to any medicine  Keep a list of the medicines, vitamins, and herbs you take  Include the amounts, and when and why you take them  Bring the list or the pill bottles to follow-up visits  Carry your medicine list with you in case of an emergency  Self-care:   · Use support devices,  such as a brace, cast, or splint, to limit your movement and protect your joint  You may need to use crutches to decrease your pain as you move around  · Go to physical therapy as directed  A physical therapist teaches you exercises to help improve movement and strength, and to decrease pain  · Rest  your ankle so that it can heal  Return to normal activities as directed  · Apply ice  on your ankle for 15 to 20 minutes every hour or as directed  Use an ice pack, or put crushed ice in a plastic bag  Cover it with a towel  Ice helps prevent tissue damage and decreases swelling and pain  · Compress  your ankle  Ask if you should wrap an elastic bandage around your injured ligament  An elastic bandage provides support and helps decrease swelling and movement so your joint can heal  Wear as long as directed  · Elevate  your ankle above the level of your heart as often as you can  This will help decrease swelling and pain  Prop your ankle on pillows or blankets to keep it elevated comfortably         Prevent another ankle sprain:   · Let your ankle heal   Find out how long your ligament needs to heal  Do not do any physical activity until your healthcare provider says it is okay  If you start activity too soon, you may develop a more serious injury  · Always warm up and stretch  before you exercise or play sports  · Use the right equipment  Always wear shoes that fit well and are made for the activity that you are doing  You may also need ankle supports, elbow and knee pads, or braces  Follow up with your doctor as directed:  Write down your questions so you remember to ask them during your visits  © Copyright [address] [place] [address] Drive Information is for End User's use only and may not be sold, redistributed or otherwise used for commercial purposes  All illustrations and images included in CareNotes® are the copyrighted property of A D A M , Inc  or [facility] [employer] [employer]   The above information is an  only  It is not intended as medical advice for individual conditions or treatments  Talk to your doctor, nurse or pharmacist before following any medical regimen to see if it is safe and effective for you

## 2021-01-04 ENCOUNTER — LAB (OUTPATIENT)
Dept: LAB | Facility: HOSPITAL | Age: 51
End: 2021-01-04
Payer: COMMERCIAL

## 2021-01-04 ENCOUNTER — TRANSCRIBE ORDERS (OUTPATIENT)
Dept: ADMINISTRATIVE | Facility: HOSPITAL | Age: 51
End: 2021-01-04

## 2021-01-04 DIAGNOSIS — M10.271: Primary | ICD-10-CM

## 2021-01-04 DIAGNOSIS — M10.271: ICD-10-CM

## 2021-01-04 LAB
CRP SERPL QL: 160 MG/L
ERYTHROCYTE [SEDIMENTATION RATE] IN BLOOD: 43 MM/HOUR (ref 0–19)
URATE SERPL-MCNC: 5.1 MG/DL (ref 4.2–8)

## 2021-01-04 PROCEDURE — 86140 C-REACTIVE PROTEIN: CPT

## 2021-01-04 PROCEDURE — 85652 RBC SED RATE AUTOMATED: CPT

## 2021-01-04 PROCEDURE — 84550 ASSAY OF BLOOD/URIC ACID: CPT

## 2021-01-04 PROCEDURE — 36415 COLL VENOUS BLD VENIPUNCTURE: CPT

## 2021-01-05 ENCOUNTER — TELEMEDICINE (OUTPATIENT)
Dept: FAMILY MEDICINE CLINIC | Facility: CLINIC | Age: 51
End: 2021-01-05
Payer: COMMERCIAL

## 2021-01-05 VITALS — TEMPERATURE: 99 F

## 2021-01-05 DIAGNOSIS — U07.1 COVID-19 VIRUS INFECTION: Primary | ICD-10-CM

## 2021-01-05 PROCEDURE — 1036F TOBACCO NON-USER: CPT | Performed by: PHYSICIAN ASSISTANT

## 2021-01-05 PROCEDURE — 99213 OFFICE O/P EST LOW 20 MIN: CPT | Performed by: PHYSICIAN ASSISTANT

## 2021-01-05 RX ORDER — CEFUROXIME AXETIL 250 MG/1
250 TABLET ORAL EVERY 12 HOURS SCHEDULED
Qty: 20 TABLET | Refills: 0 | Status: SHIPPED | OUTPATIENT
Start: 2021-01-05 | End: 2021-01-15

## 2021-01-05 NOTE — ASSESSMENT & PLAN NOTE
Day 26 or greater  Patient continues to have low-grade fevers  at night with fatigue weakness  He is status post doxycycline in December and has only a minimal cough  Over the weekend he developed swelling in his ankle and had a negative x-ray negative uric acid and a very elevated CRP at 160 and was given prednisone by his podiatrist   At this point time I am covering him for possible so secondary bacterial infection with Ceftin twice daily  He is going to try to go back to work  Further workup should be initiated if he finishes his prednisone taper and antibiotic and a symptoms do not resolve

## 2021-01-05 NOTE — PATIENT INSTRUCTIONS
Problem List Items Addressed This Visit        Other    COVID-19 virus infection - Primary     Day 32 or greater  Patient continues to have low-grade fevers  at night with fatigue weakness  He is status post doxycycline in December and has only a minimal cough  Over the weekend he developed swelling in his ankle and had a negative x-ray negative uric acid and a very elevated CRP at 160 and was given prednisone by his podiatrist   At this point time I am covering him for possible so secondary bacterial infection with Ceftin twice daily  He is going to try to go back to work  Further workup should be initiated if he finishes his prednisone taper and antibiotic and a symptoms do not resolve           Relevant Medications    cefuroxime (CEFTIN) 250 mg tablet

## 2021-01-15 ENCOUNTER — TRANSCRIBE ORDERS (OUTPATIENT)
Dept: ADMINISTRATIVE | Facility: HOSPITAL | Age: 51
End: 2021-01-15

## 2021-01-15 ENCOUNTER — LAB (OUTPATIENT)
Dept: LAB | Facility: HOSPITAL | Age: 51
End: 2021-01-15
Attending: INTERNAL MEDICINE
Payer: COMMERCIAL

## 2021-01-15 DIAGNOSIS — Z79.899 ENCOUNTER FOR LONG-TERM (CURRENT) USE OF OTHER MEDICATIONS: Primary | ICD-10-CM

## 2021-01-15 DIAGNOSIS — Z79.899 ENCOUNTER FOR LONG-TERM (CURRENT) USE OF OTHER MEDICATIONS: ICD-10-CM

## 2021-01-15 DIAGNOSIS — M25.571 RIGHT ANKLE PAIN, UNSPECIFIED CHRONICITY: ICD-10-CM

## 2021-01-15 LAB
ALBUMIN SERPL BCP-MCNC: 3.3 G/DL (ref 3.5–5)
ALP SERPL-CCNC: 100 U/L (ref 46–116)
ALT SERPL W P-5'-P-CCNC: 58 U/L (ref 12–78)
ANION GAP SERPL CALCULATED.3IONS-SCNC: 3 MMOL/L (ref 4–13)
APAP SERPL-MCNC: <2 UG/ML (ref 10–20)
AST SERPL W P-5'-P-CCNC: 34 U/L (ref 5–45)
BASOPHILS # BLD AUTO: 0.06 THOUSANDS/ΜL (ref 0–0.1)
BASOPHILS NFR BLD AUTO: 1 % (ref 0–1)
BILIRUB SERPL-MCNC: 0.29 MG/DL (ref 0.2–1)
BUN SERPL-MCNC: 14 MG/DL (ref 5–25)
CALCIUM ALBUM COR SERPL-MCNC: 9.6 MG/DL (ref 8.3–10.1)
CALCIUM SERPL-MCNC: 9 MG/DL (ref 8.3–10.1)
CHLORIDE SERPL-SCNC: 104 MMOL/L (ref 100–108)
CO2 SERPL-SCNC: 31 MMOL/L (ref 21–32)
CREAT SERPL-MCNC: 0.91 MG/DL (ref 0.6–1.3)
CRP SERPL QL: 11.2 MG/L
EOSINOPHIL # BLD AUTO: 0.23 THOUSAND/ΜL (ref 0–0.61)
EOSINOPHIL NFR BLD AUTO: 3 % (ref 0–6)
ERYTHROCYTE [DISTWIDTH] IN BLOOD BY AUTOMATED COUNT: 12.2 % (ref 11.6–15.1)
ERYTHROCYTE [SEDIMENTATION RATE] IN BLOOD: 34 MM/HOUR (ref 0–19)
GFR SERPL CREATININE-BSD FRML MDRD: 98 ML/MIN/1.73SQ M
GLUCOSE SERPL-MCNC: 93 MG/DL (ref 65–140)
HCT VFR BLD AUTO: 39.2 % (ref 36.5–49.3)
HGB BLD-MCNC: 12.8 G/DL (ref 12–17)
IMM GRANULOCYTES # BLD AUTO: 0.06 THOUSAND/UL (ref 0–0.2)
IMM GRANULOCYTES NFR BLD AUTO: 1 % (ref 0–2)
LYMPHOCYTES # BLD AUTO: 2.23 THOUSANDS/ΜL (ref 0.6–4.47)
LYMPHOCYTES NFR BLD AUTO: 29 % (ref 14–44)
MCH RBC QN AUTO: 31.3 PG (ref 26.8–34.3)
MCHC RBC AUTO-ENTMCNC: 32.7 G/DL (ref 31.4–37.4)
MCV RBC AUTO: 96 FL (ref 82–98)
MONOCYTES # BLD AUTO: 0.55 THOUSAND/ΜL (ref 0.17–1.22)
MONOCYTES NFR BLD AUTO: 7 % (ref 4–12)
NEUTROPHILS # BLD AUTO: 4.61 THOUSANDS/ΜL (ref 1.85–7.62)
NEUTS SEG NFR BLD AUTO: 59 % (ref 43–75)
NRBC BLD AUTO-RTO: 0 /100 WBCS
PLATELET # BLD AUTO: 437 THOUSANDS/UL (ref 149–390)
PMV BLD AUTO: 9.6 FL (ref 8.9–12.7)
POTASSIUM SERPL-SCNC: 4.3 MMOL/L (ref 3.5–5.3)
PROT SERPL-MCNC: 7.2 G/DL (ref 6.4–8.2)
RBC # BLD AUTO: 4.09 MILLION/UL (ref 3.88–5.62)
SODIUM SERPL-SCNC: 138 MMOL/L (ref 136–145)
WBC # BLD AUTO: 7.74 THOUSAND/UL (ref 4.31–10.16)

## 2021-01-15 PROCEDURE — 85652 RBC SED RATE AUTOMATED: CPT

## 2021-01-15 PROCEDURE — 86038 ANTINUCLEAR ANTIBODIES: CPT

## 2021-01-15 PROCEDURE — 82164 ANGIOTENSIN I ENZYME TEST: CPT

## 2021-01-15 PROCEDURE — 86235 NUCLEAR ANTIGEN ANTIBODY: CPT

## 2021-01-15 PROCEDURE — 84165 PROTEIN E-PHORESIS SERUM: CPT

## 2021-01-15 PROCEDURE — 86140 C-REACTIVE PROTEIN: CPT

## 2021-01-15 PROCEDURE — 86618 LYME DISEASE ANTIBODY: CPT

## 2021-01-15 PROCEDURE — 86225 DNA ANTIBODY NATIVE: CPT

## 2021-01-15 PROCEDURE — 80053 COMPREHEN METABOLIC PANEL: CPT

## 2021-01-15 PROCEDURE — 86200 CCP ANTIBODY: CPT

## 2021-01-15 PROCEDURE — 81374 HLA I TYPING 1 ANTIGEN LR: CPT

## 2021-01-15 PROCEDURE — 86617 LYME DISEASE ANTIBODY: CPT

## 2021-01-15 PROCEDURE — 36415 COLL VENOUS BLD VENIPUNCTURE: CPT

## 2021-01-15 PROCEDURE — 84165 PROTEIN E-PHORESIS SERUM: CPT | Performed by: PATHOLOGY

## 2021-01-15 PROCEDURE — 80143 DRUG ASSAY ACETAMINOPHEN: CPT

## 2021-01-15 PROCEDURE — 85025 COMPLETE CBC W/AUTO DIFF WBC: CPT

## 2021-01-15 PROCEDURE — 86430 RHEUMATOID FACTOR TEST QUAL: CPT

## 2021-01-16 LAB
DSDNA AB SER-ACNC: <1 IU/ML (ref 0–9)
ENA RNP AB SER-ACNC: 0.2 AI (ref 0–0.9)
ENA SM AB SER-ACNC: <0.2 AI (ref 0–0.9)
ENA SS-A AB SER-ACNC: <0.2 AI (ref 0–0.9)
ENA SS-B AB SER-ACNC: <0.2 AI (ref 0–0.9)

## 2021-01-18 LAB
ACE SERPL-CCNC: 27 U/L (ref 14–82)
ALBUMIN SERPL ELPH-MCNC: 3.77 G/DL (ref 3.5–5)
ALBUMIN SERPL ELPH-MCNC: 57.1 % (ref 52–65)
ALPHA1 GLOB SERPL ELPH-MCNC: 0.3 G/DL (ref 0.1–0.4)
ALPHA1 GLOB SERPL ELPH-MCNC: 4.5 % (ref 2.5–5)
ALPHA2 GLOB SERPL ELPH-MCNC: 0.62 G/DL (ref 0.4–1.2)
ALPHA2 GLOB SERPL ELPH-MCNC: 9.4 % (ref 7–13)
BETA GLOB ABNORMAL SERPL ELPH-MCNC: 0.5 G/DL (ref 0.4–0.8)
BETA1 GLOB SERPL ELPH-MCNC: 7.5 % (ref 5–13)
BETA2 GLOB SERPL ELPH-MCNC: 6.9 % (ref 2–8)
BETA2+GAMMA GLOB SERPL ELPH-MCNC: 0.46 G/DL (ref 0.2–0.5)
GAMMA GLOB ABNORMAL SERPL ELPH-MCNC: 0.96 G/DL (ref 0.5–1.6)
GAMMA GLOB SERPL ELPH-MCNC: 14.6 % (ref 12–22)
IGG/ALB SER: 1.33 {RATIO} (ref 1.1–1.8)
PROT PATTERN SERPL ELPH-IMP: NORMAL
PROT SERPL-MCNC: 6.6 G/DL (ref 6.4–8.2)
RHEUMATOID FACT SER QL LA: NEGATIVE
RYE IGE QN: NEGATIVE

## 2021-01-19 LAB
B BURGDOR IGG+IGM SER-ACNC: 214
CCP IGA+IGG SERPL IA-ACNC: 5 UNITS (ref 0–19)

## 2021-01-20 LAB

## 2021-01-23 LAB — HLA-B27 QL NAA+PROBE: NEGATIVE

## 2021-03-09 DIAGNOSIS — J30.9 ALLERGIC RHINITIS, UNSPECIFIED SEASONALITY, UNSPECIFIED TRIGGER: ICD-10-CM

## 2021-03-09 RX ORDER — AZELASTINE 1 MG/ML
SPRAY, METERED NASAL
Qty: 1 BOTTLE | Refills: 1 | Status: SHIPPED | OUTPATIENT
Start: 2021-03-09 | End: 2021-03-23

## 2021-03-23 DIAGNOSIS — J30.9 ALLERGIC RHINITIS, UNSPECIFIED SEASONALITY, UNSPECIFIED TRIGGER: ICD-10-CM

## 2021-03-23 RX ORDER — AZELASTINE 1 MG/ML
SPRAY, METERED NASAL
Qty: 1 BOTTLE | Refills: 1 | Status: SHIPPED | OUTPATIENT
Start: 2021-03-23 | End: 2021-11-03

## 2021-05-22 ENCOUNTER — APPOINTMENT (OUTPATIENT)
Dept: LAB | Facility: HOSPITAL | Age: 51
End: 2021-05-22
Payer: COMMERCIAL

## 2021-05-22 DIAGNOSIS — K21.9 GASTROESOPHAGEAL REFLUX DISEASE, UNSPECIFIED WHETHER ESOPHAGITIS PRESENT: ICD-10-CM

## 2021-05-22 DIAGNOSIS — R14.0 BLOATING: ICD-10-CM

## 2021-05-22 DIAGNOSIS — Z98.890 HISTORY OF NISSEN FUNDOPLICATION: ICD-10-CM

## 2021-05-22 DIAGNOSIS — K22.70 BARRETT'S ESOPHAGUS WITHOUT DYSPLASIA: ICD-10-CM

## 2021-05-22 DIAGNOSIS — R11.0 NAUSEA: ICD-10-CM

## 2021-05-22 LAB
ALBUMIN SERPL BCP-MCNC: 3.7 G/DL (ref 3.5–5)
ALP SERPL-CCNC: 91 U/L (ref 46–116)
ALT SERPL W P-5'-P-CCNC: 53 U/L (ref 12–78)
ANION GAP SERPL CALCULATED.3IONS-SCNC: 8 MMOL/L (ref 4–13)
AST SERPL W P-5'-P-CCNC: 28 U/L (ref 5–45)
BASOPHILS # BLD AUTO: 0.08 THOUSANDS/ΜL (ref 0–0.1)
BASOPHILS NFR BLD AUTO: 1 % (ref 0–1)
BILIRUB SERPL-MCNC: 0.55 MG/DL (ref 0.2–1)
BUN SERPL-MCNC: 13 MG/DL (ref 5–25)
CALCIUM SERPL-MCNC: 9.4 MG/DL (ref 8.3–10.1)
CHLORIDE SERPL-SCNC: 105 MMOL/L (ref 100–108)
CO2 SERPL-SCNC: 28 MMOL/L (ref 21–32)
CREAT SERPL-MCNC: 0.96 MG/DL (ref 0.6–1.3)
EOSINOPHIL # BLD AUTO: 0.31 THOUSAND/ΜL (ref 0–0.61)
EOSINOPHIL NFR BLD AUTO: 5 % (ref 0–6)
ERYTHROCYTE [DISTWIDTH] IN BLOOD BY AUTOMATED COUNT: 12.1 % (ref 11.6–15.1)
GFR SERPL CREATININE-BSD FRML MDRD: 92 ML/MIN/1.73SQ M
GLUCOSE P FAST SERPL-MCNC: 106 MG/DL (ref 65–99)
HCT VFR BLD AUTO: 44.1 % (ref 36.5–49.3)
HGB BLD-MCNC: 14.5 G/DL (ref 12–17)
IMM GRANULOCYTES # BLD AUTO: 0.01 THOUSAND/UL (ref 0–0.2)
IMM GRANULOCYTES NFR BLD AUTO: 0 % (ref 0–2)
LYMPHOCYTES # BLD AUTO: 1.88 THOUSANDS/ΜL (ref 0.6–4.47)
LYMPHOCYTES NFR BLD AUTO: 27 % (ref 14–44)
MCH RBC QN AUTO: 31.2 PG (ref 26.8–34.3)
MCHC RBC AUTO-ENTMCNC: 32.9 G/DL (ref 31.4–37.4)
MCV RBC AUTO: 95 FL (ref 82–98)
MONOCYTES # BLD AUTO: 0.61 THOUSAND/ΜL (ref 0.17–1.22)
MONOCYTES NFR BLD AUTO: 9 % (ref 4–12)
NEUTROPHILS # BLD AUTO: 4.03 THOUSANDS/ΜL (ref 1.85–7.62)
NEUTS SEG NFR BLD AUTO: 58 % (ref 43–75)
NRBC BLD AUTO-RTO: 0 /100 WBCS
PLATELET # BLD AUTO: 403 THOUSANDS/UL (ref 149–390)
PMV BLD AUTO: 9.2 FL (ref 8.9–12.7)
POTASSIUM SERPL-SCNC: 4.2 MMOL/L (ref 3.5–5.3)
PROT SERPL-MCNC: 7.4 G/DL (ref 6.4–8.2)
RBC # BLD AUTO: 4.65 MILLION/UL (ref 3.88–5.62)
SODIUM SERPL-SCNC: 141 MMOL/L (ref 136–145)
WBC # BLD AUTO: 6.92 THOUSAND/UL (ref 4.31–10.16)

## 2021-05-22 PROCEDURE — 80053 COMPREHEN METABOLIC PANEL: CPT

## 2021-05-22 PROCEDURE — 85025 COMPLETE CBC W/AUTO DIFF WBC: CPT

## 2021-05-22 PROCEDURE — 36415 COLL VENOUS BLD VENIPUNCTURE: CPT

## 2021-06-09 ENCOUNTER — OFFICE VISIT (OUTPATIENT)
Dept: URGENT CARE | Facility: MEDICAL CENTER | Age: 51
End: 2021-06-09
Payer: COMMERCIAL

## 2021-06-09 VITALS
OXYGEN SATURATION: 98 % | HEART RATE: 88 BPM | SYSTOLIC BLOOD PRESSURE: 167 MMHG | DIASTOLIC BLOOD PRESSURE: 91 MMHG | RESPIRATION RATE: 16 BRPM | TEMPERATURE: 99.3 F

## 2021-06-09 DIAGNOSIS — L03.90 CELLULITIS, UNSPECIFIED CELLULITIS SITE: ICD-10-CM

## 2021-06-09 DIAGNOSIS — B35.6 TINEA CRURIS: Primary | ICD-10-CM

## 2021-06-09 PROCEDURE — 99213 OFFICE O/P EST LOW 20 MIN: CPT | Performed by: PHYSICIAN ASSISTANT

## 2021-06-09 RX ORDER — CLOTRIMAZOLE 1 %
CREAM (GRAM) TOPICAL 2 TIMES DAILY
Qty: 30 G | Refills: 0 | Status: SHIPPED | OUTPATIENT
Start: 2021-06-09 | End: 2021-11-03

## 2021-06-09 RX ORDER — CLINDAMYCIN HYDROCHLORIDE 300 MG/1
300 CAPSULE ORAL 4 TIMES DAILY
Qty: 40 CAPSULE | Refills: 0 | Status: SHIPPED | OUTPATIENT
Start: 2021-06-09 | End: 2021-06-19

## 2021-06-09 NOTE — PROGRESS NOTES
[address] [facility]        NAME: Judge Gallagher is a 48 y o  male  : 1970    MRN: [id number]  DATE: 2021  TIME: 9:12 AM    /91   Pulse 88   Temp 99 3 °F (37 4 °C) (Tympanic)   Resp 16   SpO2 98%     Assessment and Plan   Tinea cruris [B35 6]  1  Tinea cruris  clindamycin (CLEOCIN) 300 MG capsule    clotrimazole (LOTRIMIN) 1 % cream   2  Cellulitis, unspecified cellulitis site  clindamycin (CLEOCIN) 300 MG capsule    clotrimazole (LOTRIMIN) 1 % cream         Patient Instructions       Follow up with PCP in 3-5 days  Proceed to  ER if symptoms worsen  Chief Complaint     Chief Complaint   Patient presents with    Rash     Patient relates started with a localized rash to right groin area  C/O itching  Denies fever  History of Present Illness       Pt with itching rash to right groin for several days and started with pimple in middle       Review of Systems   Review of Systems   Constitutional: Negative  HENT: Negative  Eyes: Negative  Respiratory: Negative  Cardiovascular: Negative  Gastrointestinal: Negative  Endocrine: Negative  Genitourinary: Negative  Musculoskeletal: Negative  Skin: Positive for rash  Allergic/Immunologic: Negative  Neurological: Negative  Hematological: Negative  Psychiatric/Behavioral: Negative  All other systems reviewed and are negative          Current Medications       Current Outpatient Medications:     atorvastatin (LIPITOR) 10 mg tablet, Take 1 tablet (10 mg total) by mouth daily, Disp: 30 tablet, Rfl: 11    azelastine (ASTELIN) 0 1 % nasal spray, SPRAY 2 SPRAYS INTO EACH NOSTRIL 2 TIMES A DAY USE IN EACH NOSTRIL AS DIRECTED (Patient not taking: Reported on 2021), Disp: 1 Bottle, Rfl: 1    b complex vitamins tablet, Take 1 tablet by mouth daily, Disp: , Rfl:     clindamycin (CLEOCIN) 300 MG capsule, Take 1 capsule (300 mg total) by mouth 4 (four) times a day for 10 days, Disp: 40 capsule, Rfl: 0    clotrimazole (LOTRIMIN) 1 % cream, Apply topically 2 (two) times a day, Disp: 30 g, Rfl: 0    famotidine (PEPCID) 20 mg tablet, Take 1 tablet (20 mg total) by mouth 2 (two) times a day for 7 days, Disp: 14 tablet, Rfl: 0    naproxen (NAPROSYN) 500 mg tablet, Take 1 tablet (500 mg total) by mouth 2 (two) times a day with meals (Patient not taking: Reported on 1/2/2021), Disp: 14 tablet, Rfl: 0    omeprazole (PriLOSEC) 20 mg delayed release capsule, Take 20 mg by mouth, Disp: , Rfl:     Promethazine-DM (PHENERGAN-DM) 6 25-15 mg/5 mL oral syrup, Take 5 mL by mouth 4 (four) times a day as needed for cough (Patient not taking: Reported on 1/2/2021), Disp: 240 mL, Rfl: 0    Sutab 4890-904-101 MG TABS, TAKE 1 KIT BY MOUTH SEE ADMINISTRATION INSTRUCTIONS (Patient not taking: Reported on 6/9/2021), Disp: 24 tablet, Rfl: 0    Current Allergies     Allergies as of 06/09/2021 - Reviewed 06/09/2021   Allergen Reaction Noted    Azithromycin GI Intolerance 06/20/2014    Penicillins  11/22/2008            The following portions of the patient's history were reviewed and updated as appropriate: allergies, current medications, past family history, past medical history, past social history, past surgical history and problem list      Past Medical History:   Diagnosis Date    Anxiety     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    Diagnosed 2013 with EGD biopsy, Dr Divina Malagon  Biopsies confirmed again in 2014 in 2015  Dr Divina Malagon, EGD 12/12/2017 Positive intestinal metaplasia without dysplasia  EGD 2020 long segment Brennan's esophagus, biopsy negative       COVID-19     GERD (gastroesophageal reflux disease) 8/30/2012    Description: s/p Carlos fundoplication, Dr Tiesha Rios    Herpes simplex type 1 infection 6/18/2014    Hyperlipidemia 8/30/2012    Lymphadenopathy 8/30/2012     Benign Cervical per Dr Vania Amin    Vitamin D deficiency 5/6/2017       Past Surgical History:   Procedure Laterality Date    DEEP NECK LYMPH NODE BIOPSY / EXCISION      cervical node biopsy with dissection of jugular nodes    EGD  01/2020    Dr Ramya King, [facility]  Nissen fundoplication evidencing gastric fundus without abnormality, Long segment Brennan's esophagus without dysplasia, bx negative   ESOPHAGOGASTRIC FUNDOPLASTY  03/2016    Nissen Fundoplication, Dr Jalloh Corporal        Family History   Problem Relation Age of Onset    Heart attack Father         Anteroapical MI         Medications have been verified  Objective   /91   Pulse 88   Temp 99 3 °F (37 4 °C) (Tympanic)   Resp 16   SpO2 98%        Physical Exam     Physical Exam  Vitals signs and nursing note reviewed  Constitutional:       Appearance: Normal appearance  He is normal weight  HENT:      Head: Normocephalic and atraumatic  Right Ear: Tympanic membrane, ear canal and external ear normal       Left Ear: Tympanic membrane, ear canal and external ear normal       Nose: Nose normal       Mouth/Throat:      Mouth: Mucous membranes are moist       Pharynx: Oropharynx is clear  Eyes:      Conjunctiva/sclera: Conjunctivae normal       Pupils: Pupils are equal, round, and reactive to light  Neck:      Musculoskeletal: Normal range of motion and neck supple  Cardiovascular:      Rate and Rhythm: Normal rate and regular rhythm  Pulses: Normal pulses  Heart sounds: Normal heart sounds  Pulmonary:      Effort: Pulmonary effort is normal       Breath sounds: Normal breath sounds  Abdominal:      General: Abdomen is flat  Bowel sounds are normal    Musculoskeletal: Normal range of motion  Skin:     General: Skin is warm  Capillary Refill: Capillary refill takes less than 2 seconds  Comments: Right groin tinea cruris,   Pea size tender swelling erythema to area    Neurological:      General: No focal deficit present  Mental Status: He is alert and oriented to person, place, and time     Psychiatric:         Mood and Affect: Mood normal          Behavior: Behavior normal

## 2021-06-09 NOTE — PATIENT INSTRUCTIONS
Cellulitis   WHAT YOU NEED TO KNOW:   Cellulitis is a skin infection caused by bacteria  Cellulitis may go away on its own or you may need treatment  Your healthcare provider may draw a [place] around the outside edges of your cellulitis  If your cellulitis spreads, your healthcare provider will see it outside of the [place]  DISCHARGE INSTRUCTIONS:   Call 911 if:   · You have sudden trouble breathing or chest pain  Return to the emergency department if:   · Your wound gets larger and more painful  · You feel a crackling under your skin when you touch it  · You have purple dots or bumps on your skin, or you see bleeding under your skin  · You have new swelling and pain in your legs  · The red, warm, swollen area gets larger  · You see red streaks coming from the infected area  Contact your healthcare provider if:   · You have a fever  · Your fever or pain does not go away or gets worse  · The area does not get smaller after 2 days of antibiotics  · Your skin is flaking or peeling off  · You have questions or concerns about your condition or care  Medicines:   · Antibiotics  help treat the bacterial infection  · NSAIDs , such as ibuprofen, help decrease swelling, pain, and fever  NSAIDs can cause stomach bleeding or kidney problems in certain people  If you take blood thinner medicine, always ask if NSAIDs are safe for you  Always read the medicine label and follow directions  Do not give these medicines to children under 10months of age without direction from your child's healthcare provider  · Acetaminophen  decreases pain and fever  It is available without a doctor's order  Ask how much to take and how often to take it  Follow directions  Read the labels of all other medicines you are using to see if they also contain acetaminophen, or ask your doctor or pharmacist  Acetaminophen can cause liver damage if not taken correctly   Do not use more than 4 grams (4,000 milligrams) total of acetaminophen in one day  · Take your medicine as directed  Contact your healthcare provider if you think your medicine is not helping or if you have side effects  Tell him or her if you are allergic to any medicine  Keep a list of the medicines, vitamins, and herbs you take  Include the amounts, and when and why you take them  Bring the list or the pill bottles to follow-up visits  Carry your medicine list with you in case of an emergency  Self-care:   · Elevate the area above the level of your heart  as often as you can  This will help decrease swelling and pain  Prop the area on pillows or blankets to keep it elevated comfortably  · Clean the area daily until the wound scabs over  Gently wash the area with soap and water  Pat dry  Use dressings as directed  · Place cool or warm, wet cloths on the area as directed  Use clean cloths and clean water  Leave it on the area until the cloth is room temperature  Pat the area dry with a clean, dry cloth  The cloths may help decrease pain  Prevent cellulitis:   · Do not scratch bug bites or areas of injury  You increase your risk for cellulitis by scratching these areas  · Do not share personal items, such as towels, clothing, and razors  · Clean exercise equipment  with germ-killing  before and after you use it  · Wash your hands often  Use soap and water  Wash your hands after you use the bathroom, change a child's diapers, or sneeze  Wash your hands before you prepare or eat food  Use lotion to prevent dry, cracked skin  · Wear pressure stockings as directed  You may be told to wear the stockings if you have peripheral edema  The stockings improve blood flow and decrease swelling  · Treat athlete's foot  This can help prevent the spread of a bacterial skin infection  Follow up with your healthcare provider within 3 days, or as directed:   Your healthcare provider will check if your cellulitis is getting better  You may need different medicine  Write down your questions so you remember to ask them during your visits  © Copyright [address] Information is for End User's use only and may not be sold, redistributed or otherwise used for commercial purposes  All illustrations and images included in CareNotes® are the copyrighted property of [employer]  or [employer]   The above information is an  only  It is not intended as medical advice for individual conditions or treatments  Talk to your doctor, nurse or pharmacist before following any medical regimen to see if it is safe and effective for you  Jock Itch   WHAT YOU NEED TO KNOW:   Jock itch is a rash on your groin  The groin is the area between your abdomen and legs  Jock itch is usually easy to treat and prevent  It is caused by a fungus  The fungus also causes athlete's foot  DISCHARGE INSTRUCTIONS:   Medicines:   · Fungus medicine:  Jock itch is usually treated with a cream that kills the fungus  Apply the cream to the rash and the skin around it as directed  You may need to apply the cream 1 to 2 times each day for 2 weeks  You may be given this medicine as a pill if the cream does not help  · Take your medicine as directed  Contact your healthcare provider if you think your medicine is not helping or if you have side effects  Tell him or her if you are allergic to any medicine  Keep a list of the medicines, vitamins, and herbs you take  Include the amounts, and when and why you take them  Bring the list or the pill bottles to follow-up visits  Carry your medicine list with you in case of an emergency  Manage and prevent jock itch:   · Keep the area dry  · Wear light, loose clothes  Do not share clothes  · Do not wear wet clothes for long periods  Wash athletic gear after you play sports  · Bathe daily  Dry your skin completely after you bathe   Apply cream or powder after you bathe as directed if you get jock itch often  Wash your hands often to prevent the spread of the fungus  You may want to wear disposable gloves when you clean your feet  The gloves will keep the fungus from moving from your feet to your hands  · Use separate towels to dry each part of your body  Put your socks on before you put on your underwear so you do not spread the fungus from your feet to your groin  · Lose weight if you weigh more than your healthcare provider suggests  Follow up with your healthcare provider or skin specialist as directed: Your healthcare provider will examine your rash to see how well it is healing  If you take medicine as a pill, he may draw blood to check how your liver and kidneys are working  Write down your questions so you remember to ask them during your visits  Contact your healthcare provider or skin specialist if:   · Your signs and symptoms do not get better within 2 weeks of treatment  · Your signs and symptoms get worse or come back after treatment  · You get a rash on a part of your body other than your groin  · You have a fever  · You have questions or concerns about your condition or care  © Copyright [address] Information is for End User's use only and may not be sold, redistributed or otherwise used for commercial purposes  All illustrations and images included in CareNotes® are the copyrighted property of [employer]  or [facility] Marge Hdz   The above information is an  only  It is not intended as medical advice for individual conditions or treatments  Talk to your doctor, nurse or pharmacist before following any medical regimen to see if it is safe and effective for you

## 2021-09-29 DIAGNOSIS — Z12.5 SCREENING FOR PROSTATE CANCER: ICD-10-CM

## 2021-09-29 DIAGNOSIS — R59.1 LYMPHADENOPATHY: ICD-10-CM

## 2021-09-29 DIAGNOSIS — K21.00 GASTROESOPHAGEAL REFLUX DISEASE WITH ESOPHAGITIS WITHOUT HEMORRHAGE: Primary | ICD-10-CM

## 2021-09-29 DIAGNOSIS — E78.5 HYPERLIPIDEMIA, UNSPECIFIED HYPERLIPIDEMIA TYPE: ICD-10-CM

## 2021-09-29 DIAGNOSIS — E55.9 VITAMIN D DEFICIENCY: ICD-10-CM

## 2021-09-29 RX ORDER — ATORVASTATIN CALCIUM 10 MG/1
TABLET, FILM COATED ORAL
Qty: 90 TABLET | Refills: 3 | Status: SHIPPED | OUTPATIENT
Start: 2021-09-29 | End: 2021-11-03 | Stop reason: SDUPTHER

## 2021-09-29 NOTE — TELEPHONE ENCOUNTER
I have renewed patient's atorvastatin however I 1 patient to complete blood work in October than make an appointment 1 week after completing blood work

## 2021-11-03 ENCOUNTER — OFFICE VISIT (OUTPATIENT)
Dept: FAMILY MEDICINE CLINIC | Facility: CLINIC | Age: 51
End: 2021-11-03
Payer: COMMERCIAL

## 2021-11-03 VITALS
HEART RATE: 64 BPM | TEMPERATURE: 96.7 F | WEIGHT: 213 LBS | DIASTOLIC BLOOD PRESSURE: 80 MMHG | SYSTOLIC BLOOD PRESSURE: 108 MMHG | BODY MASS INDEX: 28.85 KG/M2 | HEIGHT: 72 IN

## 2021-11-03 DIAGNOSIS — Z12.5 SCREENING PSA (PROSTATE SPECIFIC ANTIGEN): ICD-10-CM

## 2021-11-03 DIAGNOSIS — H92.02 ACUTE OTALGIA, LEFT: Primary | ICD-10-CM

## 2021-11-03 DIAGNOSIS — K21.00 GASTROESOPHAGEAL REFLUX DISEASE WITH ESOPHAGITIS WITHOUT HEMORRHAGE: ICD-10-CM

## 2021-11-03 DIAGNOSIS — J30.9 ALLERGIC RHINITIS, UNSPECIFIED SEASONALITY, UNSPECIFIED TRIGGER: ICD-10-CM

## 2021-11-03 DIAGNOSIS — Z13.29 SCREENING FOR THYROID DISORDER: ICD-10-CM

## 2021-11-03 DIAGNOSIS — Z13.1 SCREENING FOR DIABETES MELLITUS: ICD-10-CM

## 2021-11-03 DIAGNOSIS — E78.5 HYPERLIPIDEMIA, UNSPECIFIED HYPERLIPIDEMIA TYPE: ICD-10-CM

## 2021-11-03 DIAGNOSIS — E55.9 VITAMIN D DEFICIENCY: ICD-10-CM

## 2021-11-03 DIAGNOSIS — Z13.0 SCREENING FOR DEFICIENCY ANEMIA: ICD-10-CM

## 2021-11-03 DIAGNOSIS — K22.70 BARRETT'S ESOPHAGUS WITHOUT DYSPLASIA: ICD-10-CM

## 2021-11-03 PROCEDURE — 1036F TOBACCO NON-USER: CPT | Performed by: NURSE PRACTITIONER

## 2021-11-03 PROCEDURE — 3725F SCREEN DEPRESSION PERFORMED: CPT | Performed by: NURSE PRACTITIONER

## 2021-11-03 PROCEDURE — 3008F BODY MASS INDEX DOCD: CPT | Performed by: NURSE PRACTITIONER

## 2021-11-03 PROCEDURE — 99214 OFFICE O/P EST MOD 30 MIN: CPT | Performed by: NURSE PRACTITIONER

## 2021-11-03 RX ORDER — FLUTICASONE PROPIONATE 50 MCG
1 SPRAY, SUSPENSION (ML) NASAL DAILY
Qty: 11.1 ML | Refills: 3 | Status: SHIPPED | OUTPATIENT
Start: 2021-11-03 | End: 2021-12-08

## 2021-11-03 RX ORDER — METHYLPREDNISOLONE 4 MG/1
TABLET ORAL
Qty: 21 EACH | Refills: 0 | Status: SHIPPED | OUTPATIENT
Start: 2021-11-03 | End: 2021-12-08

## 2021-11-03 RX ORDER — ATORVASTATIN CALCIUM 10 MG/1
10 TABLET, FILM COATED ORAL DAILY
Qty: 90 TABLET | Refills: 0 | Status: SHIPPED | OUTPATIENT
Start: 2021-11-03 | End: 2022-05-04

## 2021-11-03 RX ORDER — LORATADINE 10 MG/1
10 TABLET ORAL DAILY
Qty: 30 TABLET | Refills: 3 | Status: SHIPPED | OUTPATIENT
Start: 2021-11-03 | End: 2021-12-08

## 2021-11-10 PROCEDURE — 88305 TISSUE EXAM BY PATHOLOGIST: CPT | Performed by: PATHOLOGY

## 2021-11-11 ENCOUNTER — LAB REQUISITION (OUTPATIENT)
Dept: LAB | Facility: HOSPITAL | Age: 51
End: 2021-11-11
Payer: COMMERCIAL

## 2021-11-11 DIAGNOSIS — K22.70 BARRETT'S ESOPHAGUS WITHOUT DYSPLASIA: ICD-10-CM

## 2021-11-24 ENCOUNTER — APPOINTMENT (OUTPATIENT)
Dept: LAB | Facility: CLINIC | Age: 51
End: 2021-11-24
Payer: COMMERCIAL

## 2021-11-24 DIAGNOSIS — Z13.1 SCREENING FOR DIABETES MELLITUS: ICD-10-CM

## 2021-11-24 DIAGNOSIS — Z13.29 SCREENING FOR THYROID DISORDER: ICD-10-CM

## 2021-11-24 DIAGNOSIS — Z12.5 SCREENING FOR PROSTATE CANCER: ICD-10-CM

## 2021-11-24 DIAGNOSIS — Z12.5 SCREENING PSA (PROSTATE SPECIFIC ANTIGEN): ICD-10-CM

## 2021-11-24 DIAGNOSIS — Z13.0 SCREENING FOR DEFICIENCY ANEMIA: ICD-10-CM

## 2021-11-24 DIAGNOSIS — E55.9 VITAMIN D DEFICIENCY: ICD-10-CM

## 2021-11-24 DIAGNOSIS — R59.1 LYMPHADENOPATHY: ICD-10-CM

## 2021-11-24 DIAGNOSIS — K21.00 GASTROESOPHAGEAL REFLUX DISEASE WITH ESOPHAGITIS WITHOUT HEMORRHAGE: ICD-10-CM

## 2021-11-24 DIAGNOSIS — E78.5 HYPERLIPIDEMIA, UNSPECIFIED HYPERLIPIDEMIA TYPE: ICD-10-CM

## 2021-11-24 LAB
25(OH)D3 SERPL-MCNC: 29.2 NG/ML (ref 30–100)
ALBUMIN SERPL BCP-MCNC: 3.8 G/DL (ref 3.5–5)
ALP SERPL-CCNC: 98 U/L (ref 46–116)
ALT SERPL W P-5'-P-CCNC: 57 U/L (ref 12–78)
ANION GAP SERPL CALCULATED.3IONS-SCNC: 6 MMOL/L (ref 4–13)
AST SERPL W P-5'-P-CCNC: 27 U/L (ref 5–45)
BACTERIA UR QL AUTO: NORMAL /HPF
BILIRUB SERPL-MCNC: 0.62 MG/DL (ref 0.2–1)
BILIRUB UR QL STRIP: NEGATIVE
BUN SERPL-MCNC: 12 MG/DL (ref 5–25)
CALCIUM SERPL-MCNC: 9.2 MG/DL (ref 8.3–10.1)
CHLORIDE SERPL-SCNC: 107 MMOL/L (ref 100–108)
CHOLEST SERPL-MCNC: 210 MG/DL
CLARITY UR: CLEAR
CO2 SERPL-SCNC: 26 MMOL/L (ref 21–32)
COLOR UR: YELLOW
CREAT SERPL-MCNC: 0.93 MG/DL (ref 0.6–1.3)
ERYTHROCYTE [DISTWIDTH] IN BLOOD BY AUTOMATED COUNT: 12.4 % (ref 11.6–15.1)
GFR SERPL CREATININE-BSD FRML MDRD: 95 ML/MIN/1.73SQ M
GLUCOSE P FAST SERPL-MCNC: 94 MG/DL (ref 65–99)
GLUCOSE UR STRIP-MCNC: NEGATIVE MG/DL
HCT VFR BLD AUTO: 44.3 % (ref 36.5–49.3)
HDLC SERPL-MCNC: 52 MG/DL
HGB BLD-MCNC: 14.8 G/DL (ref 12–17)
HGB UR QL STRIP.AUTO: NEGATIVE
HYALINE CASTS #/AREA URNS LPF: NORMAL /LPF
KETONES UR STRIP-MCNC: NEGATIVE MG/DL
LDLC SERPL CALC-MCNC: 124 MG/DL (ref 0–100)
LEUKOCYTE ESTERASE UR QL STRIP: NEGATIVE
MCH RBC QN AUTO: 32 PG (ref 26.8–34.3)
MCHC RBC AUTO-ENTMCNC: 33.4 G/DL (ref 31.4–37.4)
MCV RBC AUTO: 96 FL (ref 82–98)
NITRITE UR QL STRIP: NEGATIVE
NON-SQ EPI CELLS URNS QL MICRO: NORMAL /HPF
PH UR STRIP.AUTO: 7 [PH]
PLATELET # BLD AUTO: 429 THOUSANDS/UL (ref 149–390)
PMV BLD AUTO: 9.7 FL (ref 8.9–12.7)
POTASSIUM SERPL-SCNC: 4.2 MMOL/L (ref 3.5–5.3)
PROT SERPL-MCNC: 7.6 G/DL (ref 6.4–8.2)
PROT UR STRIP-MCNC: ABNORMAL MG/DL
PSA SERPL-MCNC: 2 NG/ML (ref 0–4)
RBC # BLD AUTO: 4.63 MILLION/UL (ref 3.88–5.62)
RBC #/AREA URNS AUTO: NORMAL /HPF
SODIUM SERPL-SCNC: 139 MMOL/L (ref 136–145)
SP GR UR STRIP.AUTO: 1.02 (ref 1–1.03)
TRIGL SERPL-MCNC: 170 MG/DL
TSH SERPL DL<=0.05 MIU/L-ACNC: 1.04 UIU/ML (ref 0.36–3.74)
UROBILINOGEN UR QL STRIP.AUTO: 0.2 E.U./DL
WBC # BLD AUTO: 6.96 THOUSAND/UL (ref 4.31–10.16)
WBC #/AREA URNS AUTO: NORMAL /HPF

## 2021-11-24 PROCEDURE — 80061 LIPID PANEL: CPT

## 2021-11-24 PROCEDURE — 80053 COMPREHEN METABOLIC PANEL: CPT

## 2021-11-24 PROCEDURE — 81001 URINALYSIS AUTO W/SCOPE: CPT

## 2021-11-24 PROCEDURE — G0103 PSA SCREENING: HCPCS

## 2021-11-24 PROCEDURE — 36415 COLL VENOUS BLD VENIPUNCTURE: CPT

## 2021-11-24 PROCEDURE — 85027 COMPLETE CBC AUTOMATED: CPT

## 2021-11-24 PROCEDURE — 82306 VITAMIN D 25 HYDROXY: CPT

## 2021-11-24 PROCEDURE — 84443 ASSAY THYROID STIM HORMONE: CPT

## 2021-12-02 ENCOUNTER — TELEPHONE (OUTPATIENT)
Dept: FAMILY MEDICINE CLINIC | Facility: CLINIC | Age: 51
End: 2021-12-02

## 2021-12-08 ENCOUNTER — OFFICE VISIT (OUTPATIENT)
Dept: FAMILY MEDICINE CLINIC | Facility: CLINIC | Age: 51
End: 2021-12-08
Payer: COMMERCIAL

## 2021-12-08 VITALS
DIASTOLIC BLOOD PRESSURE: 70 MMHG | SYSTOLIC BLOOD PRESSURE: 122 MMHG | HEART RATE: 74 BPM | HEIGHT: 72 IN | BODY MASS INDEX: 28.58 KG/M2 | TEMPERATURE: 97 F | WEIGHT: 211 LBS

## 2021-12-08 DIAGNOSIS — J30.9 ALLERGIC RHINITIS, UNSPECIFIED SEASONALITY, UNSPECIFIED TRIGGER: Primary | ICD-10-CM

## 2021-12-08 DIAGNOSIS — D75.839 THROMBOCYTOSIS: ICD-10-CM

## 2021-12-08 DIAGNOSIS — Z12.5 SCREENING FOR PROSTATE CANCER: ICD-10-CM

## 2021-12-08 DIAGNOSIS — E78.2 MIXED HYPERLIPIDEMIA: ICD-10-CM

## 2021-12-08 DIAGNOSIS — Z00.00 HEALTH CARE MAINTENANCE: ICD-10-CM

## 2021-12-08 DIAGNOSIS — E55.9 VITAMIN D DEFICIENCY: ICD-10-CM

## 2021-12-08 DIAGNOSIS — U07.1 COVID-19 VIRUS INFECTION: ICD-10-CM

## 2021-12-08 DIAGNOSIS — Z23 ENCOUNTER FOR IMMUNIZATION: ICD-10-CM

## 2021-12-08 DIAGNOSIS — K21.00 GASTROESOPHAGEAL REFLUX DISEASE WITH ESOPHAGITIS WITHOUT HEMORRHAGE: ICD-10-CM

## 2021-12-08 DIAGNOSIS — Z12.11 SCREENING FOR COLON CANCER: ICD-10-CM

## 2021-12-08 DIAGNOSIS — K22.70 BARRETT'S ESOPHAGUS WITHOUT DYSPLASIA: ICD-10-CM

## 2021-12-08 PROBLEM — R53.83 FATIGUE: Status: RESOLVED | Noted: 2017-06-09 | Resolved: 2021-12-08

## 2021-12-08 PROBLEM — H92.02 ACUTE OTALGIA, LEFT: Status: RESOLVED | Noted: 2021-11-03 | Resolved: 2021-12-08

## 2021-12-08 PROBLEM — G47.19 EXCESSIVE DAYTIME SLEEPINESS: Status: RESOLVED | Noted: 2017-08-15 | Resolved: 2021-12-08

## 2021-12-08 PROBLEM — R31.9 HEMATURIA: Status: RESOLVED | Noted: 2017-08-17 | Resolved: 2021-12-08

## 2021-12-08 PROCEDURE — 3008F BODY MASS INDEX DOCD: CPT

## 2021-12-08 PROCEDURE — 1036F TOBACCO NON-USER: CPT

## 2021-12-08 PROCEDURE — 99214 OFFICE O/P EST MOD 30 MIN: CPT

## 2021-12-08 PROCEDURE — 90682 RIV4 VACC RECOMBINANT DNA IM: CPT

## 2021-12-08 PROCEDURE — 90471 IMMUNIZATION ADMIN: CPT

## 2021-12-08 RX ORDER — MULTIVIT-MIN/IRON/FOLIC ACID/K 18-600-40
CAPSULE ORAL
COMMUNITY

## 2022-02-04 ENCOUNTER — TELEPHONE (OUTPATIENT)
Dept: FAMILY MEDICINE CLINIC | Facility: CLINIC | Age: 52
End: 2022-02-04

## 2022-02-04 DIAGNOSIS — B00.9 HERPES SIMPLEX TYPE 1 INFECTION: Primary | ICD-10-CM

## 2022-02-04 NOTE — TELEPHONE ENCOUNTER
TS, Pt is requesting an Rx for Valtrex, Pt states he has a cold sore and would like this called into the pharmacy

## 2022-02-07 RX ORDER — VALACYCLOVIR HYDROCHLORIDE 1 G/1
TABLET, FILM COATED ORAL
Qty: 4 TABLET | Refills: 5 | Status: SHIPPED | OUTPATIENT
Start: 2022-02-07 | End: 2022-06-20 | Stop reason: SDUPTHER

## 2022-03-16 ENCOUNTER — TELEPHONE (OUTPATIENT)
Dept: FAMILY MEDICINE CLINIC | Facility: CLINIC | Age: 52
End: 2022-03-16

## 2022-03-16 NOTE — TELEPHONE ENCOUNTER
Patient called c/o chest pain and tightness  It started in the middle of the night as upper back pain  He said it felt like someone punched him in the back  Today has tightness and pain in his chest  When asked if he did any kind of heavy lifting or vigorous activity, he said absolutely not  I advised ER  He agreed

## 2022-05-04 DIAGNOSIS — E78.5 HYPERLIPIDEMIA, UNSPECIFIED HYPERLIPIDEMIA TYPE: ICD-10-CM

## 2022-05-04 RX ORDER — ATORVASTATIN CALCIUM 10 MG/1
TABLET, FILM COATED ORAL
Qty: 90 TABLET | Refills: 0 | Status: SHIPPED | OUTPATIENT
Start: 2022-05-04

## 2022-05-04 NOTE — TELEPHONE ENCOUNTER
Requested Prescriptions     Pending Prescriptions Disp Refills    atorvastatin (LIPITOR) 10 mg tablet [Pharmacy Med Name: ATORVASTATIN 10 MG TABLET] 90 tablet 0     Sig: TAKE 1 TABLET BY MOUTH EVERY DAY       LOV 12/8/21, F/U non scheduled, Labs pending

## 2022-06-20 DIAGNOSIS — B00.9 HERPES SIMPLEX TYPE 1 INFECTION: ICD-10-CM

## 2022-06-20 RX ORDER — VALACYCLOVIR HYDROCHLORIDE 1 G/1
TABLET, FILM COATED ORAL
Qty: 4 TABLET | Refills: 5 | Status: SHIPPED | OUTPATIENT
Start: 2022-06-20 | End: 2022-07-26 | Stop reason: SDUPTHER

## 2022-06-25 ENCOUNTER — TELEPHONE (OUTPATIENT)
Dept: FAMILY MEDICINE CLINIC | Facility: CLINIC | Age: 52
End: 2022-06-25

## 2022-06-25 ENCOUNTER — OFFICE VISIT (OUTPATIENT)
Dept: URGENT CARE | Facility: MEDICAL CENTER | Age: 52
End: 2022-06-25
Payer: COMMERCIAL

## 2022-06-25 VITALS
BODY MASS INDEX: 30.1 KG/M2 | TEMPERATURE: 98 F | SYSTOLIC BLOOD PRESSURE: 137 MMHG | OXYGEN SATURATION: 99 % | DIASTOLIC BLOOD PRESSURE: 92 MMHG | RESPIRATION RATE: 18 BRPM | HEIGHT: 71 IN | WEIGHT: 215 LBS | HEART RATE: 75 BPM

## 2022-06-25 DIAGNOSIS — L73.8 FOLLICULITIS BARBAE: Primary | ICD-10-CM

## 2022-06-25 PROCEDURE — 99213 OFFICE O/P EST LOW 20 MIN: CPT | Performed by: PHYSICIAN ASSISTANT

## 2022-06-25 RX ORDER — DOXYCYCLINE 100 MG/1
100 TABLET ORAL 2 TIMES DAILY
Qty: 20 TABLET | Refills: 0 | Status: SHIPPED | OUTPATIENT
Start: 2022-06-25 | End: 2022-07-05

## 2022-06-25 NOTE — PROGRESS NOTES
[facility]        NAME: Jose Dhillon is a 46 y o  male  : 1970    MRN: [id number]  DATE: 2022  TIME: 5:59 PM    Assessment and Plan   Folliculitis barbae [J11 6]  1  Folliculitis barbae  doxycycline (ADOXA) 100 MG tablet         Patient Instructions       Follow up with PCP as needed  Finish doxycycline and utilize sunscreen      Chief Complaint     Chief Complaint   Patient presents with    Rash     Patient states his face broke out and he gets this every year and he gets Doxy and it goes away         History of Present Illness       Patient has had this rash about once a year and usually responds of to doxycycline  Rash  This is a recurrent problem  The current episode started in the past 7 days  The problem has been gradually worsening since onset  The affected locations include the face  The problem is mild  The rash is characterized by dryness, redness, peeling and scaling  He was exposed to nothing  Associated symptoms include itching  Pertinent negatives include no anorexia, congestion, cough, decreased physical activity, decreased responsiveness, decreased sleep, drinking less, diarrhea, facial edema, fatigue, fever, joint pain, rhinorrhea, shortness of breath, sore throat or vomiting  Past treatments include nothing  Review of Systems   Review of Systems   Constitutional: Negative for decreased responsiveness, fatigue and fever  HENT: Negative for congestion, rhinorrhea and sore throat  Respiratory: Negative for cough and shortness of breath  Gastrointestinal: Negative for anorexia, diarrhea and vomiting  Musculoskeletal: Negative for joint pain  Skin: Positive for itching and rash  All other systems reviewed and are negative          Current Medications       Current Outpatient Medications:     atorvastatin (LIPITOR) 10 mg tablet, TAKE 1 TABLET BY MOUTH EVERY DAY, Disp: 90 tablet, Rfl: 0    doxycycline (ADOXA) 100 MG tablet, Take 1 tablet (100 mg total) by mouth 2 (two) times a day for 10 days, Disp: 20 tablet, Rfl: 0    valACYclovir (VALTREX) 1,000 mg tablet, Take 2 tablets twice daily for 1 day at 1st sign of cold sore (Patient not taking: Reported on 6/25/2022), Disp: 4 tablet, Rfl: 5    Vitamin D, Cholecalciferol, 50 MCG (2000 UT) CAPS, Take by mouth (Patient not taking: Reported on 6/25/2022), Disp: , Rfl:     Current Allergies     Allergies as of 06/25/2022 - Reviewed 06/25/2022   Allergen Reaction Noted    Azithromycin GI Intolerance 06/20/2014    Penicillins  11/22/2008            The following portions of the patient's history were reviewed and updated as appropriate: allergies, current medications, past family history, past medical history, past social history, past surgical history and problem list      Past Medical History:   Diagnosis Date    Anxiety     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    Diagnosed 2013 with EGD biopsy, Dr Ada Back  Biopsies confirmed again in 2014 in 2015  Dr Ada Back, EGD 12/12/2017 Positive intestinal metaplasia without dysplasia  EGD 2020 long segment Brennan's esophagus, biopsy negative   COVID-19     COVID-19 virus infection 12/16/2020    15Dec: poitive test 16Dec:  Day 4  Some issues with SOB and cough  18Dec: Day 6: Feeling better overall  Increased cough  21Dec: Day 9: First symptoms 11Dec  Continues with minor symptoms  22Dec: Day 10  Has imporved a bit  Add Phenergan at HS  12/23/2020  Released from isolation see note 12/28: Still reporting lingering symptoms of COVID      GERD (gastroesophageal reflux disease) 8/30/2012    Description: s/p Carlos fundoplication, Dr Christiane Ray    Herpes simplex type 1 infection 6/18/2014    Hyperlipidemia 8/30/2012    Lymphadenopathy 8/30/2012     Benign Cervical per Dr Malika Aldana    Vitamin D deficiency 5/6/2017       Past Surgical History:   Procedure Laterality Date    DEEP NECK LYMPH NODE BIOPSY / EXCISION      cervical node biopsy with dissection of jugular nodes    EGD  01/2020    Dr Hank Saini, [facility]  Nissen fundoplication evidencing gastric fundus without abnormality, Long segment Brennan's esophagus without dysplasia, bx negative   ESOPHAGOGASTRIC FUNDOPLASTY  03/2016    Nissen Fundoplication, Dr Omaira Aleman        Family History   Problem Relation Age of Onset    Heart attack Father         Anteroapical MI         Medications have been verified  Objective   /92   Pulse 75   Temp 98 °F (36 7 °C)   Resp 18   Ht 5' 11" (1 803 m)   Wt 97 5 kg (215 lb)   SpO2 99%   BMI 29 99 kg/m²   No LMP for male patient  Physical Exam     Physical Exam  Vitals and nursing note reviewed  Constitutional:       Appearance: Normal appearance  He is normal weight  Cardiovascular:      Rate and Rhythm: Normal rate and regular rhythm  Pulses: Normal pulses  Heart sounds: Normal heart sounds  Pulmonary:      Effort: Pulmonary effort is normal       Breath sounds: Normal breath sounds  Neurological:      Mental Status: He is alert         Left chin folliculitis erythema

## 2022-06-25 NOTE — TELEPHONE ENCOUNTER
Patient called in stating that he used to get a cream from his dermatologist doxycicline, patient wanted to know if his doctor can prescribed him some of that  I did advise him that he needed an appt and that dr Ami Seo wasn't on call , patient wanted note sent back anyway  patient stated that he is going out of town tomorrow, and would like a call if this can or cant be done   Please advise   Thank you

## 2022-07-14 ENCOUNTER — APPOINTMENT (OUTPATIENT)
Dept: LAB | Facility: CLINIC | Age: 52
End: 2022-07-14
Payer: COMMERCIAL

## 2022-07-14 DIAGNOSIS — Z12.11 SCREENING FOR COLON CANCER: ICD-10-CM

## 2022-07-14 DIAGNOSIS — J30.9 ALLERGIC RHINITIS, UNSPECIFIED SEASONALITY, UNSPECIFIED TRIGGER: ICD-10-CM

## 2022-07-14 DIAGNOSIS — Z12.5 SCREENING FOR PROSTATE CANCER: ICD-10-CM

## 2022-07-14 DIAGNOSIS — U07.1 COVID-19 VIRUS INFECTION: ICD-10-CM

## 2022-07-14 DIAGNOSIS — K22.70 BARRETT'S ESOPHAGUS WITHOUT DYSPLASIA: ICD-10-CM

## 2022-07-14 DIAGNOSIS — K21.00 GASTROESOPHAGEAL REFLUX DISEASE WITH ESOPHAGITIS WITHOUT HEMORRHAGE: ICD-10-CM

## 2022-07-14 DIAGNOSIS — D75.839 THROMBOCYTOSIS: ICD-10-CM

## 2022-07-14 DIAGNOSIS — E55.9 VITAMIN D DEFICIENCY: ICD-10-CM

## 2022-07-14 DIAGNOSIS — E78.2 MIXED HYPERLIPIDEMIA: ICD-10-CM

## 2022-07-14 LAB
25(OH)D3 SERPL-MCNC: 35.9 NG/ML (ref 30–100)
ALBUMIN SERPL BCP-MCNC: 3.3 G/DL (ref 3.5–5)
ALP SERPL-CCNC: 86 U/L (ref 46–116)
ALT SERPL W P-5'-P-CCNC: 43 U/L (ref 12–78)
ANION GAP SERPL CALCULATED.3IONS-SCNC: 3 MMOL/L (ref 4–13)
AST SERPL W P-5'-P-CCNC: 23 U/L (ref 5–45)
BILIRUB SERPL-MCNC: 0.97 MG/DL (ref 0.2–1)
BILIRUB UR QL STRIP: NEGATIVE
BUN SERPL-MCNC: 13 MG/DL (ref 5–25)
CALCIUM ALBUM COR SERPL-MCNC: 9.7 MG/DL (ref 8.3–10.1)
CALCIUM SERPL-MCNC: 9.1 MG/DL (ref 8.3–10.1)
CHLORIDE SERPL-SCNC: 107 MMOL/L (ref 100–108)
CHOLEST SERPL-MCNC: 185 MG/DL
CLARITY UR: CLEAR
CO2 SERPL-SCNC: 27 MMOL/L (ref 21–32)
COLOR UR: NORMAL
CREAT SERPL-MCNC: 0.95 MG/DL (ref 0.6–1.3)
ERYTHROCYTE [DISTWIDTH] IN BLOOD BY AUTOMATED COUNT: 12.1 % (ref 11.6–15.1)
GFR SERPL CREATININE-BSD FRML MDRD: 92 ML/MIN/1.73SQ M
GLUCOSE P FAST SERPL-MCNC: 109 MG/DL (ref 65–99)
GLUCOSE UR STRIP-MCNC: NEGATIVE MG/DL
HCT VFR BLD AUTO: 42.5 % (ref 36.5–49.3)
HDLC SERPL-MCNC: 50 MG/DL
HGB BLD-MCNC: 13.9 G/DL (ref 12–17)
HGB UR QL STRIP.AUTO: NEGATIVE
KETONES UR STRIP-MCNC: NEGATIVE MG/DL
LDLC SERPL CALC-MCNC: 108 MG/DL (ref 0–100)
LEUKOCYTE ESTERASE UR QL STRIP: NEGATIVE
MCH RBC QN AUTO: 31 PG (ref 26.8–34.3)
MCHC RBC AUTO-ENTMCNC: 32.7 G/DL (ref 31.4–37.4)
MCV RBC AUTO: 95 FL (ref 82–98)
NITRITE UR QL STRIP: NEGATIVE
PH UR STRIP.AUTO: 7 [PH]
PLATELET # BLD AUTO: 415 THOUSANDS/UL (ref 149–390)
PMV BLD AUTO: 9.5 FL (ref 8.9–12.7)
POTASSIUM SERPL-SCNC: 4.4 MMOL/L (ref 3.5–5.3)
PROT SERPL-MCNC: 7.1 G/DL (ref 6.4–8.2)
PROT UR STRIP-MCNC: NEGATIVE MG/DL
RBC # BLD AUTO: 4.48 MILLION/UL (ref 3.88–5.62)
SODIUM SERPL-SCNC: 137 MMOL/L (ref 136–145)
SP GR UR STRIP.AUTO: 1.01 (ref 1–1.03)
TRIGL SERPL-MCNC: 137 MG/DL
TSH SERPL DL<=0.05 MIU/L-ACNC: 1.61 UIU/ML (ref 0.45–4.5)
UROBILINOGEN UR STRIP-ACNC: <2 MG/DL
WBC # BLD AUTO: 8.18 THOUSAND/UL (ref 4.31–10.16)

## 2022-07-14 PROCEDURE — 85027 COMPLETE CBC AUTOMATED: CPT

## 2022-07-14 PROCEDURE — 82306 VITAMIN D 25 HYDROXY: CPT

## 2022-07-14 PROCEDURE — 36415 COLL VENOUS BLD VENIPUNCTURE: CPT

## 2022-07-14 PROCEDURE — 84443 ASSAY THYROID STIM HORMONE: CPT

## 2022-07-14 PROCEDURE — 81003 URINALYSIS AUTO W/O SCOPE: CPT

## 2022-07-14 PROCEDURE — 80053 COMPREHEN METABOLIC PANEL: CPT

## 2022-07-14 PROCEDURE — 80061 LIPID PANEL: CPT

## 2022-07-26 ENCOUNTER — OFFICE VISIT (OUTPATIENT)
Dept: FAMILY MEDICINE CLINIC | Facility: CLINIC | Age: 52
End: 2022-07-26
Payer: COMMERCIAL

## 2022-07-26 VITALS
HEIGHT: 71 IN | DIASTOLIC BLOOD PRESSURE: 84 MMHG | RESPIRATION RATE: 16 BRPM | HEART RATE: 75 BPM | WEIGHT: 209.4 LBS | TEMPERATURE: 96.9 F | BODY MASS INDEX: 29.31 KG/M2 | SYSTOLIC BLOOD PRESSURE: 128 MMHG

## 2022-07-26 DIAGNOSIS — L70.0 ACNE VULGARIS: ICD-10-CM

## 2022-07-26 DIAGNOSIS — D75.839 THROMBOCYTOSIS: ICD-10-CM

## 2022-07-26 DIAGNOSIS — K22.70 BARRETT'S ESOPHAGUS WITHOUT DYSPLASIA: ICD-10-CM

## 2022-07-26 DIAGNOSIS — J30.9 ALLERGIC RHINITIS, UNSPECIFIED SEASONALITY, UNSPECIFIED TRIGGER: ICD-10-CM

## 2022-07-26 DIAGNOSIS — E78.2 MIXED HYPERLIPIDEMIA: ICD-10-CM

## 2022-07-26 DIAGNOSIS — E55.9 VITAMIN D DEFICIENCY: ICD-10-CM

## 2022-07-26 DIAGNOSIS — B00.9 HERPES SIMPLEX TYPE 1 INFECTION: ICD-10-CM

## 2022-07-26 DIAGNOSIS — N52.9 ERECTILE DYSFUNCTION, UNSPECIFIED ERECTILE DYSFUNCTION TYPE: Primary | ICD-10-CM

## 2022-07-26 DIAGNOSIS — Z13.1 SCREENING FOR DIABETES MELLITUS: ICD-10-CM

## 2022-07-26 PROCEDURE — 3725F SCREEN DEPRESSION PERFORMED: CPT | Performed by: NURSE PRACTITIONER

## 2022-07-26 PROCEDURE — 99214 OFFICE O/P EST MOD 30 MIN: CPT | Performed by: NURSE PRACTITIONER

## 2022-07-26 RX ORDER — SILDENAFIL 50 MG/1
50 TABLET, FILM COATED ORAL DAILY PRN
Qty: 10 TABLET | Refills: 0 | Status: SHIPPED | OUTPATIENT
Start: 2022-07-26 | End: 2022-08-04 | Stop reason: SDUPTHER

## 2022-07-26 RX ORDER — DOXYCYCLINE HYCLATE 100 MG/1
100 CAPSULE ORAL EVERY 12 HOURS SCHEDULED
Qty: 20 CAPSULE | Refills: 0 | Status: SHIPPED | OUTPATIENT
Start: 2022-07-26 | End: 2022-08-05

## 2022-07-26 RX ORDER — VALACYCLOVIR HYDROCHLORIDE 1 G/1
TABLET, FILM COATED ORAL
Qty: 30 TABLET | Refills: 2 | Status: SHIPPED | OUTPATIENT
Start: 2022-07-26 | End: 2023-07-20

## 2022-07-26 NOTE — ASSESSMENT & PLAN NOTE
Lipid panel ordered to be drawn prior to next office visit  Patient was advised to continue to limit fatty and fried foods in his diet

## 2022-07-26 NOTE — ASSESSMENT & PLAN NOTE
Viagra was ordered to be used as needed  PSA level was also ordered to assess for any signs of BPH or prostate abnormalities

## 2022-07-26 NOTE — PROGRESS NOTES
Assessment and Plan:    Problem List Items Addressed This Visit        Digestive    Brennan esophagus     Patient continues to follow with GI for this  Respiratory    Allergic rhinitis     No current issues regarding this  Musculoskeletal and Integument    Acne vulgaris     Doxycycline was ordered for the patient to have on hand as needed for this  Relevant Medications    doxycycline hyclate (VIBRAMYCIN) 100 mg capsule       Hematopoietic and Hemostatic    Thrombocytosis     This was once again noted on patient's most recent CBC  CBC with diff was ordered to be completed prior to next office visit to continue to monitor this  Relevant Orders    CBC and differential       Other    Herpes simplex type 1 infection     Valtrex was ordered for the patient to have on hand as needed  Relevant Medications    valACYclovir (VALTREX) 1,000 mg tablet    Mixed hyperlipidemia     Lipid panel ordered to be drawn prior to next office visit  Patient was advised to continue to limit fatty and fried foods in his diet  Relevant Orders    Lipid Panel with Direct LDL reflex    Vitamin D deficiency     Well controlled on current regimen  Erectile dysfunction - Primary     Viagra was ordered to be used as needed  PSA level was also ordered to assess for any signs of BPH or prostate abnormalities  Relevant Medications    sildenafil (VIAGRA) 50 MG tablet    Other Relevant Orders    PSA, total and free      Other Visit Diagnoses     Screening for diabetes mellitus        Relevant Orders    Comprehensive metabolic panel                 Diagnoses and all orders for this visit:    Erectile dysfunction, unspecified erectile dysfunction type  -     sildenafil (VIAGRA) 50 MG tablet; Take 1 tablet (50 mg total) by mouth daily as needed for erectile dysfunction  -     PSA, total and free;  Future    Herpes simplex type 1 infection  -     valACYclovir (VALTREX) 1,000 mg tablet; Take 2 tablets twice daily for 1 day at 1st sign of cold sore    Acne vulgaris  -     doxycycline hyclate (VIBRAMYCIN) 100 mg capsule; Take 1 capsule (100 mg total) by mouth every 12 (twelve) hours for 10 days    Mixed hyperlipidemia  -     Lipid Panel with Direct LDL reflex; Future    Thrombocytosis  -     CBC and differential; Future    Screening for diabetes mellitus  -     Comprehensive metabolic panel; Future    Brennan's esophagus without dysplasia    Allergic rhinitis, unspecified seasonality, unspecified trigger    Vitamin D deficiency            Subjective:      Patient ID: Veronica Gomez is a 46 y o  male  CC:    Chief Complaint   Patient presents with    Follow-up     Patient in office for lab result follow up       HPI:    Allergic rhinitis:  Patient denies any current issues regarding this and is not currently taking allergy medication  Brennan esophagus:  Patient reports that he is annual follow-up with GI regarding this and has had multiple EGDs completed in the past   He denies any current GERD symptoms and is not currently taking GERD medication  Mixed hyperlipidemia:  Patient's most recent lipid panel showed slightly elevated LDL level but was normal otherwise  Patient is currently managed on Lipitor 10 mg daily  Vitamin-D deficiency:  Patient's most recent vitamin-D level was normal   Patient is currently taking a daily vitamin-D supplement  Acne:  The patient was prescribed doxycycline for this recently and this did improve his symptoms  Patient does report that this is currently an intermittent problem and he is requesting to have doxycycline on hand to use for this in the future  ED:  Patient reports that he has been experiencing intermittent ED symptoms over the past few months  He denies any dysuria, difficulty starting stream, frequent nocturia, dribbling, or any other BPH symptoms  Patient is requesting Viagra to be used as needed      HSV 1:  Patient reports that this was treated well with Valtrex in the past   Patient is requesting a refill of Valtrex to have on hand as needed for this  The following portions of the patient's history were reviewed and updated as appropriate: allergies, current medications, past family history, past medical history, past social history, past surgical history and problem list       Review of Systems   Constitutional: Negative for chills and fever  HENT: Negative for ear pain and sore throat  Eyes: Negative for pain and visual disturbance  Respiratory: Negative for cough, chest tightness, shortness of breath and wheezing  Cardiovascular: Negative for chest pain, palpitations and leg swelling  Gastrointestinal: Negative for abdominal pain, constipation, diarrhea, nausea and vomiting  Endocrine: Negative for cold intolerance and heat intolerance  Genitourinary: Negative for decreased urine volume, difficulty urinating, dysuria, frequency, hematuria and urgency  Intermittent ED symptoms   Musculoskeletal: Negative for arthralgias, back pain and myalgias  Skin: Negative for color change and rash  Allergic/Immunologic: Positive for environmental allergies  Neurological: Negative for dizziness, seizures, syncope, weakness, light-headedness, numbness and headaches  Hematological: Negative for adenopathy  Psychiatric/Behavioral: Negative for confusion  The patient is not nervous/anxious  All other systems reviewed and are negative  Data to review:       Objective:    Vitals:    07/26/22 0827   BP: 128/84   BP Location: Right arm   Patient Position: Sitting   Cuff Size: Adult   Pulse: 75   Resp: 16   Temp: (!) 96 9 °F (36 1 °C)   TempSrc: Temporal   Weight: 95 kg (209 lb 6 4 oz)   Height: 5' 11" (1 803 m)        Physical Exam  Vitals and nursing note reviewed  Constitutional:       General: He is not in acute distress  Appearance: Normal appearance  He is well-developed   He is not ill-appearing  HENT:      Head: Normocephalic and atraumatic  Eyes:      Conjunctiva/sclera: Conjunctivae normal    Cardiovascular:      Rate and Rhythm: Normal rate and regular rhythm  Pulses: Normal pulses  Carotid pulses are 2+ on the right side and 2+ on the left side  Radial pulses are 2+ on the right side and 2+ on the left side  Posterior tibial pulses are 2+ on the right side and 2+ on the left side  Heart sounds: Normal heart sounds  No murmur heard  Pulmonary:      Effort: Pulmonary effort is normal  No respiratory distress  Breath sounds: Normal breath sounds  No wheezing or rhonchi  Abdominal:      General: Abdomen is flat  Bowel sounds are normal  There is no distension  Palpations: Abdomen is soft  Tenderness: There is no abdominal tenderness  There is no guarding  Musculoskeletal:         General: Normal range of motion  Cervical back: Normal range of motion and neck supple  Right lower leg: No edema  Left lower leg: No edema  Skin:     General: Skin is warm and dry  Capillary Refill: Capillary refill takes less than 2 seconds  Neurological:      General: No focal deficit present  Mental Status: He is alert and oriented to person, place, and time  Psychiatric:         Mood and Affect: Mood normal          Behavior: Behavior normal          Thought Content: Thought content normal          Judgment: Judgment normal            BMI Counseling: Body mass index is 29 21 kg/m²  The BMI is above normal  Nutrition recommendations include decreasing portion sizes, encouraging healthy choices of fruits and vegetables, moderation in carbohydrate intake and increasing intake of lean protein  Exercise recommendations include exercising 3-5 times per week and obtaining a gym membership  No pharmacotherapy was ordered  Rationale for BMI follow-up plan is due to patient being overweight or obese       Depression Screening and Follow-up Plan: Patient was screened for depression during today's encounter  They screened negative with a PHQ-2 score of 0

## 2022-07-26 NOTE — ASSESSMENT & PLAN NOTE
This was once again noted on patient's most recent CBC  CBC with diff was ordered to be completed prior to next office visit to continue to monitor this

## 2022-08-04 ENCOUNTER — TELEPHONE (OUTPATIENT)
Dept: OTHER | Facility: OTHER | Age: 52
End: 2022-08-04

## 2022-08-04 ENCOUNTER — OFFICE VISIT (OUTPATIENT)
Dept: FAMILY MEDICINE CLINIC | Facility: CLINIC | Age: 52
End: 2022-08-04
Payer: COMMERCIAL

## 2022-08-04 VITALS
BODY MASS INDEX: 29.05 KG/M2 | OXYGEN SATURATION: 99 % | DIASTOLIC BLOOD PRESSURE: 74 MMHG | HEIGHT: 71 IN | TEMPERATURE: 96.8 F | WEIGHT: 207.5 LBS | SYSTOLIC BLOOD PRESSURE: 112 MMHG | HEART RATE: 76 BPM

## 2022-08-04 DIAGNOSIS — R11.0 NAUSEA: ICD-10-CM

## 2022-08-04 DIAGNOSIS — K21.9 GASTROESOPHAGEAL REFLUX DISEASE WITHOUT ESOPHAGITIS: ICD-10-CM

## 2022-08-04 DIAGNOSIS — H65.01 RIGHT ACUTE SEROUS OTITIS MEDIA, RECURRENCE NOT SPECIFIED: ICD-10-CM

## 2022-08-04 DIAGNOSIS — H69.80 DYSFUNCTION OF EUSTACHIAN TUBE, UNSPECIFIED LATERALITY: ICD-10-CM

## 2022-08-04 DIAGNOSIS — N52.9 ERECTILE DYSFUNCTION, UNSPECIFIED ERECTILE DYSFUNCTION TYPE: ICD-10-CM

## 2022-08-04 DIAGNOSIS — J30.9 ALLERGIC RHINITIS, UNSPECIFIED SEASONALITY, UNSPECIFIED TRIGGER: Primary | ICD-10-CM

## 2022-08-04 LAB
SARS-COV-2 AG UPPER RESP QL IA: NEGATIVE
VALID CONTROL: NORMAL

## 2022-08-04 PROCEDURE — 87811 SARS-COV-2 COVID19 W/OPTIC: CPT | Performed by: FAMILY MEDICINE

## 2022-08-04 PROCEDURE — 99214 OFFICE O/P EST MOD 30 MIN: CPT | Performed by: FAMILY MEDICINE

## 2022-08-04 PROCEDURE — 96372 THER/PROPH/DIAG INJ SC/IM: CPT | Performed by: FAMILY MEDICINE

## 2022-08-04 PROCEDURE — 3725F SCREEN DEPRESSION PERFORMED: CPT | Performed by: FAMILY MEDICINE

## 2022-08-04 RX ORDER — TRIAMCINOLONE ACETONIDE 40 MG/ML
40 INJECTION, SUSPENSION INTRA-ARTICULAR; INTRAMUSCULAR ONCE
Status: COMPLETED | OUTPATIENT
Start: 2022-08-04 | End: 2022-08-04

## 2022-08-04 RX ORDER — SILDENAFIL 50 MG/1
50 TABLET, FILM COATED ORAL DAILY PRN
Qty: 10 TABLET | Refills: 0 | Status: SHIPPED | OUTPATIENT
Start: 2022-08-04

## 2022-08-04 RX ORDER — OMEPRAZOLE 20 MG/1
20 CAPSULE, DELAYED RELEASE ORAL
Qty: 30 CAPSULE | Refills: 5 | Status: SHIPPED | OUTPATIENT
Start: 2022-08-04

## 2022-08-04 RX ORDER — AZELASTINE 1 MG/ML
2 SPRAY, METERED NASAL 2 TIMES DAILY
Qty: 30 ML | Refills: 3 | Status: SHIPPED | OUTPATIENT
Start: 2022-08-04

## 2022-08-04 RX ADMIN — TRIAMCINOLONE ACETONIDE 40 MG: 40 INJECTION, SUSPENSION INTRA-ARTICULAR; INTRAMUSCULAR at 18:38

## 2022-08-04 NOTE — TELEPHONE ENCOUNTER
Pt  Called and would like to know if he can get an appointment with Dr Claudene Bosch today  He has an ear ache and digestive issues  Please call the Pt  Back to schedule him

## 2022-08-04 NOTE — PATIENT INSTRUCTIONS
Start Astelin nasal spray 2 sprays both nostrils twice daily   Start omeprazole 20 milligram once daily   Return in 1 week if still symptoms

## 2022-08-04 NOTE — PROGRESS NOTES
Assessment and Plan:  1  Per allergic rhinitis  2  Eustachian tube dysfunction  3  Nausea  4  Acute serous right otitis media  Astelin nasal spray ordered  Kenalog 40 milligrams IM given  Rapid COVID completed  Rapid COVID was negative  5  GERD, omeprazole ordered  6   Return in 1 week if still symptoms    Problem List Items Addressed This Visit        Digestive    Gastroesophageal reflux disease without esophagitis     Omeprazole 20 milligrams started         Relevant Medications    omeprazole (PriLOSEC) 20 mg delayed release capsule       Respiratory    Allergic rhinitis - Primary    Relevant Medications    azelastine (ASTELIN) 0 1 % nasal spray    triamcinolone acetonide (KENALOG-40) 40 mg/mL injection 40 mg      Other Visit Diagnoses     Dysfunction of Eustachian tube, unspecified laterality        Relevant Medications    azelastine (ASTELIN) 0 1 % nasal spray    triamcinolone acetonide (KENALOG-40) 40 mg/mL injection 40 mg    Nausea        Relevant Medications    triamcinolone acetonide (KENALOG-40) 40 mg/mL injection 40 mg    Right acute serous otitis media, recurrence not specified        Relevant Medications    triamcinolone acetonide (KENALOG-40) 40 mg/mL injection 40 mg                 Diagnoses and all orders for this visit:    Allergic rhinitis, unspecified seasonality, unspecified trigger  -     azelastine (ASTELIN) 0 1 % nasal spray; 2 sprays into each nostril 2 (two) times a day Use in each nostril as directed  -     triamcinolone acetonide (KENALOG-40) 40 mg/mL injection 40 mg    Dysfunction of Eustachian tube, unspecified laterality  -     azelastine (ASTELIN) 0 1 % nasal spray; 2 sprays into each nostril 2 (two) times a day Use in each nostril as directed  -     triamcinolone acetonide (KENALOG-40) 40 mg/mL injection 40 mg    Nausea  -     triamcinolone acetonide (KENALOG-40) 40 mg/mL injection 40 mg    Right acute serous otitis media, recurrence not specified  -     triamcinolone acetonide (KENALOG-40) 40 mg/mL injection 40 mg    Gastroesophageal reflux disease without esophagitis  -     omeprazole (PriLOSEC) 20 mg delayed release capsule; Take 1 capsule (20 mg total) by mouth daily before breakfast            Subjective:      Patient ID: Gamaliel Anderson is a 46 y o  male  CC:    Chief Complaint   Patient presents with    Earache     Right ear x 3 days  mgb    Nausea     Pt states he has an issue with reflux and a lot of times gets these sx's   mgb    Nasal Congestion       HPI:    For the past 3 days patient mild right ear pain and pressure, head congestion postnasal drip mild nausea scratchy throat  No medication has been used  Patient has not checked himself for COVID      The following portions of the patient's history were reviewed and updated as appropriate: allergies, current medications, past family history, past medical history, past social history, past surgical history and problem list       Review of Systems   Constitutional: Negative for chills and fever  HENT:        HPI   Eyes: Negative  Respiratory: Negative  Cardiovascular: Negative  Gastrointestinal:        Increasing reflux symptoms and nausea   Endocrine: Negative  Genitourinary: Negative  Musculoskeletal: Negative  Skin: Negative  Allergic/Immunologic: Positive for environmental allergies  Neurological: Negative  Hematological: Negative  Psychiatric/Behavioral: Negative  Data to review:       Objective:    Vitals:    08/04/22 1759   BP: 112/74   BP Location: Left arm   Patient Position: Sitting   Cuff Size: Standard   Pulse: 76   Temp: (!) 96 8 °F (36 °C)   TempSrc: Temporal   SpO2: 99%   Weight: 94 1 kg (207 lb 8 oz)   Height: 5' 11" (1 803 m)        Physical Exam  Vitals and nursing note reviewed  Constitutional:       Appearance: Normal appearance  HENT:      Head: Normocephalic and atraumatic        Ears:      Comments: Bilateral TMs dull right with fluid no injection Nose:      Comments: Positive allergic turbinates     Mouth/Throat:      Comments: Clear postnasal drip negative pharyngeal injection or exudate  Eyes:      General: No scleral icterus  Cardiovascular:      Rate and Rhythm: Normal rate and regular rhythm  Heart sounds: Normal heart sounds  Pulmonary:      Effort: Pulmonary effort is normal       Breath sounds: Normal breath sounds  Abdominal:      General: Bowel sounds are normal       Palpations: Abdomen is soft  Tenderness: There is no abdominal tenderness  Musculoskeletal:      Cervical back: Neck supple  Lymphadenopathy:      Cervical: No cervical adenopathy  Skin:     General: Skin is warm and dry  Neurological:      General: No focal deficit present  Mental Status: He is alert  Psychiatric:         Mood and Affect: Mood normal              Depression Screening and Follow-up Plan: Patient was screened for depression during today's encounter  They screened negative with a PHQ-2 score of 0

## 2022-09-14 DIAGNOSIS — E78.5 HYPERLIPIDEMIA, UNSPECIFIED HYPERLIPIDEMIA TYPE: ICD-10-CM

## 2022-09-14 RX ORDER — ATORVASTATIN CALCIUM 10 MG/1
TABLET, FILM COATED ORAL
Qty: 90 TABLET | Refills: 1 | Status: SHIPPED | OUTPATIENT
Start: 2022-09-14

## 2022-10-12 PROBLEM — Z12.11 SCREENING FOR COLON CANCER: Status: RESOLVED | Noted: 2021-12-08 | Resolved: 2022-10-12

## 2022-10-12 PROBLEM — Z12.5 SCREENING FOR PROSTATE CANCER: Status: RESOLVED | Noted: 2021-09-29 | Resolved: 2022-10-12

## 2022-12-12 ENCOUNTER — APPOINTMENT (OUTPATIENT)
Dept: LAB | Facility: CLINIC | Age: 52
End: 2022-12-12

## 2022-12-12 DIAGNOSIS — D75.839 THROMBOCYTOSIS: ICD-10-CM

## 2022-12-12 DIAGNOSIS — Z13.1 SCREENING FOR DIABETES MELLITUS: ICD-10-CM

## 2022-12-12 DIAGNOSIS — E78.2 MIXED HYPERLIPIDEMIA: ICD-10-CM

## 2022-12-12 DIAGNOSIS — N52.9 ERECTILE DYSFUNCTION, UNSPECIFIED ERECTILE DYSFUNCTION TYPE: ICD-10-CM

## 2022-12-12 LAB
ALBUMIN SERPL BCP-MCNC: 3.7 G/DL (ref 3.5–5)
ALP SERPL-CCNC: 94 U/L (ref 46–116)
ALT SERPL W P-5'-P-CCNC: 51 U/L (ref 12–78)
ANION GAP SERPL CALCULATED.3IONS-SCNC: 5 MMOL/L (ref 4–13)
AST SERPL W P-5'-P-CCNC: 30 U/L (ref 5–45)
BASOPHILS # BLD AUTO: 0.08 THOUSANDS/ÂΜL (ref 0–0.1)
BASOPHILS NFR BLD AUTO: 1 % (ref 0–1)
BILIRUB SERPL-MCNC: 0.52 MG/DL (ref 0.2–1)
BUN SERPL-MCNC: 12 MG/DL (ref 5–25)
CALCIUM SERPL-MCNC: 9.4 MG/DL (ref 8.3–10.1)
CHLORIDE SERPL-SCNC: 109 MMOL/L (ref 96–108)
CHOLEST SERPL-MCNC: 194 MG/DL
CO2 SERPL-SCNC: 25 MMOL/L (ref 21–32)
CREAT SERPL-MCNC: 0.99 MG/DL (ref 0.6–1.3)
EOSINOPHIL # BLD AUTO: 0.31 THOUSAND/ÂΜL (ref 0–0.61)
EOSINOPHIL NFR BLD AUTO: 4 % (ref 0–6)
ERYTHROCYTE [DISTWIDTH] IN BLOOD BY AUTOMATED COUNT: 11.8 % (ref 11.6–15.1)
GFR SERPL CREATININE-BSD FRML MDRD: 87 ML/MIN/1.73SQ M
GLUCOSE P FAST SERPL-MCNC: 117 MG/DL (ref 65–99)
HCT VFR BLD AUTO: 45.5 % (ref 36.5–49.3)
HDLC SERPL-MCNC: 53 MG/DL
HGB BLD-MCNC: 15 G/DL (ref 12–17)
IMM GRANULOCYTES # BLD AUTO: 0.02 THOUSAND/UL (ref 0–0.2)
IMM GRANULOCYTES NFR BLD AUTO: 0 % (ref 0–2)
LDLC SERPL CALC-MCNC: 105 MG/DL (ref 0–100)
LYMPHOCYTES # BLD AUTO: 2.17 THOUSANDS/ÂΜL (ref 0.6–4.47)
LYMPHOCYTES NFR BLD AUTO: 29 % (ref 14–44)
MCH RBC QN AUTO: 31.7 PG (ref 26.8–34.3)
MCHC RBC AUTO-ENTMCNC: 33 G/DL (ref 31.4–37.4)
MCV RBC AUTO: 96 FL (ref 82–98)
MONOCYTES # BLD AUTO: 0.75 THOUSAND/ÂΜL (ref 0.17–1.22)
MONOCYTES NFR BLD AUTO: 10 % (ref 4–12)
NEUTROPHILS # BLD AUTO: 4.18 THOUSANDS/ÂΜL (ref 1.85–7.62)
NEUTS SEG NFR BLD AUTO: 56 % (ref 43–75)
NRBC BLD AUTO-RTO: 0 /100 WBCS
PLATELET # BLD AUTO: 423 THOUSANDS/UL (ref 149–390)
PMV BLD AUTO: 9.8 FL (ref 8.9–12.7)
POTASSIUM SERPL-SCNC: 4.1 MMOL/L (ref 3.5–5.3)
PROT SERPL-MCNC: 7.5 G/DL (ref 6.4–8.4)
RBC # BLD AUTO: 4.73 MILLION/UL (ref 3.88–5.62)
SODIUM SERPL-SCNC: 139 MMOL/L (ref 135–147)
TRIGL SERPL-MCNC: 180 MG/DL
WBC # BLD AUTO: 7.51 THOUSAND/UL (ref 4.31–10.16)

## 2022-12-13 DIAGNOSIS — E78.2 MIXED HYPERLIPIDEMIA: Primary | ICD-10-CM

## 2022-12-13 DIAGNOSIS — R73.01 IFG (IMPAIRED FASTING GLUCOSE): Primary | ICD-10-CM

## 2022-12-13 LAB
PSA FREE MFR SERPL: 40 %
PSA FREE SERPL-MCNC: 0.56 NG/ML
PSA SERPL-MCNC: 1.4 NG/ML (ref 0–4)

## 2022-12-13 RX ORDER — ATORVASTATIN CALCIUM 20 MG/1
20 TABLET, FILM COATED ORAL DAILY
Qty: 90 TABLET | Refills: 1 | Status: SHIPPED | OUTPATIENT
Start: 2022-12-13

## 2022-12-16 ENCOUNTER — LAB (OUTPATIENT)
Dept: LAB | Facility: CLINIC | Age: 52
End: 2022-12-16

## 2022-12-16 ENCOUNTER — TELEPHONE (OUTPATIENT)
Dept: FAMILY MEDICINE CLINIC | Facility: CLINIC | Age: 52
End: 2022-12-16

## 2022-12-16 DIAGNOSIS — R73.01 IFG (IMPAIRED FASTING GLUCOSE): ICD-10-CM

## 2022-12-16 LAB
ANION GAP SERPL CALCULATED.3IONS-SCNC: 3 MMOL/L (ref 4–13)
BUN SERPL-MCNC: 13 MG/DL (ref 5–25)
CALCIUM SERPL-MCNC: 9 MG/DL (ref 8.3–10.1)
CHLORIDE SERPL-SCNC: 108 MMOL/L (ref 96–108)
CO2 SERPL-SCNC: 26 MMOL/L (ref 21–32)
CREAT SERPL-MCNC: 0.92 MG/DL (ref 0.6–1.3)
EST. AVERAGE GLUCOSE BLD GHB EST-MCNC: 111 MG/DL
GFR SERPL CREATININE-BSD FRML MDRD: 95 ML/MIN/1.73SQ M
GLUCOSE P FAST SERPL-MCNC: 111 MG/DL (ref 65–99)
HBA1C MFR BLD: 5.5 %
POTASSIUM SERPL-SCNC: 3.9 MMOL/L (ref 3.5–5.3)
SODIUM SERPL-SCNC: 137 MMOL/L (ref 135–147)

## 2022-12-16 NOTE — TELEPHONE ENCOUNTER
No, patient is not considered prediabetic as his hemoglobin A1c was normal and this is what we use to diagnose diabetes

## 2022-12-16 NOTE — TELEPHONE ENCOUNTER
Patient called for his bloodwork results, I read him Cirilo's response  Stefani Terry stated patient is not diabetic, but he wants to know if he is pre-diabetic?

## 2023-01-16 PROBLEM — N52.9 ERECTILE DYSFUNCTION: Status: RESOLVED | Noted: 2022-07-26 | Resolved: 2023-01-16

## 2023-01-17 ENCOUNTER — OFFICE VISIT (OUTPATIENT)
Dept: FAMILY MEDICINE CLINIC | Facility: CLINIC | Age: 53
End: 2023-01-17

## 2023-01-17 VITALS
BODY MASS INDEX: 30.38 KG/M2 | WEIGHT: 217 LBS | SYSTOLIC BLOOD PRESSURE: 130 MMHG | TEMPERATURE: 97.3 F | HEART RATE: 78 BPM | OXYGEN SATURATION: 98 % | RESPIRATION RATE: 20 BRPM | DIASTOLIC BLOOD PRESSURE: 80 MMHG | HEIGHT: 71 IN

## 2023-01-17 DIAGNOSIS — M25.552 HIP PAIN, BILATERAL: Primary | ICD-10-CM

## 2023-01-17 DIAGNOSIS — K22.70 BARRETT'S ESOPHAGUS WITHOUT DYSPLASIA: ICD-10-CM

## 2023-01-17 DIAGNOSIS — D75.839 THROMBOCYTOSIS: ICD-10-CM

## 2023-01-17 DIAGNOSIS — M25.551 HIP PAIN, BILATERAL: Primary | ICD-10-CM

## 2023-01-17 DIAGNOSIS — E66.9 OBESITY (BMI 30-39.9): ICD-10-CM

## 2023-01-17 DIAGNOSIS — K21.9 GASTROESOPHAGEAL REFLUX DISEASE WITHOUT ESOPHAGITIS: ICD-10-CM

## 2023-01-17 DIAGNOSIS — E78.2 MIXED HYPERLIPIDEMIA: ICD-10-CM

## 2023-01-17 DIAGNOSIS — R20.2 LEG PARESTHESIA: ICD-10-CM

## 2023-01-17 RX ORDER — CELECOXIB 200 MG/1
200 CAPSULE ORAL 2 TIMES DAILY
Qty: 30 CAPSULE | Refills: 1 | Status: SHIPPED | OUTPATIENT
Start: 2023-01-17

## 2023-01-17 NOTE — PATIENT INSTRUCTIONS
Pleat x-rays of hips and low back  Use Celebrex 20 mg twice daily with food as needed for pain or discomfort  Follow-up with physical therapy  May continue chiropractic therapy  Diet exercise weight loss recommended  Recheck 4 weeks

## 2023-01-17 NOTE — PROGRESS NOTES
Name: Inocencia Carrillo      : 1970      MRN: [id number]  Encounter Provider: Lita Hines DO  Encounter Date: 2023   Encounter department: [facility]ARE    Assessment & Plan     1  Bilateral hip pain  2  Leg paresthesia  Hip and lumbar spine x-rays ordered  Celebrex as ordered  Refer to physical therapy  Patient may continue chiropractic therapy  3  GERD  4  Brennan's esophagus  Continue Protonix and Pepcid  Follows with GI  Because of this we will use Seo 2 instead of NSAID  5  Hyperlipidemia, stable continue atorvastatin 20 mg daily  6  BMI 30 27 patient gained 11 pounds diet exercise weight loss recommended  7  Thrombocytosis very mild, will continue to monitor  8  Recheck 4 weeks sooner if needed        1  Hip pain, bilateral  -     XR hips bilateral 2 vw w pelvis if performed; Future; Expected date: 2023  -     XR spine lumbar minimum 4 views non injury; Future; Expected date: 2023  -     celecoxib (CeleBREX) 200 mg capsule; Take 1 capsule (200 mg total) by mouth 2 (two) times a day  -     Ambulatory Referral to Physical Therapy; Future    2  Leg paresthesia  -     XR hips bilateral 2 vw w pelvis if performed; Future; Expected date: 2023  -     XR spine lumbar minimum 4 views non injury; Future; Expected date: 2023  -     celecoxib (CeleBREX) 200 mg capsule; Take 1 capsule (200 mg total) by mouth 2 (two) times a day  -     Ambulatory Referral to Physical Therapy; Future    3  Gastroesophageal reflux disease without esophagitis  Assessment & Plan:  Stable continue Pepcid/Protonix follows with GI  Because of this we will use COX2 instead of NSAID      4  Brennan's esophagus without dysplasia  Assessment & Plan:  As above      5  Thrombocytosis  Assessment & Plan:  Chronic/stable continue to monitor      6  Mixed hyperlipidemia  Assessment & Plan: We will continue atorvastatin 20 mg daily      7  Obesity (BMI 30-39  9)  Assessment & Plan:  BMI 30 27 patient gained 11 pounds diet exercise weight loss recommended        BMI Counseling: Body mass index is 30 27 kg/m²  The BMI is above normal  Nutrition recommendations include moderation in carbohydrate intake and reducing intake of cholesterol  Exercise recommendations include exercising 3-5 times per week  Rationale for BMI follow-up plan is due to patient being overweight or obese  Depression Screening and Follow-up Plan: Patient was screened for depression during today's encounter  They screened negative with a PHQ-2 score of 0  Subjective      For about a month patient bilateral hip pain mainly lateral and posterior  Going to the chiropractor told "L3 and L4 are out"  Seems to have improved but did not  Patient denies any leg pain but does admit to occasional paresthesias  Blood work was discussed  Patient gained 11 pounds since last office visit    Review of Systems   Constitutional: Positive for unexpected weight change  HENT: Negative  Eyes: Negative  Respiratory: Negative  Cardiovascular: Negative  Gastrointestinal: Negative  Endocrine: Negative  Genitourinary: Negative  Musculoskeletal:        HPI   Skin: Negative  Allergic/Immunologic: Negative  Neurological:        HPI   Hematological: Negative  Psychiatric/Behavioral: Negative          Current Outpatient Medications on File Prior to Visit   Medication Sig   • atorvastatin (LIPITOR) 20 mg tablet Take 1 tablet (20 mg total) by mouth daily   • famotidine (PEPCID) 40 MG tablet TAKE 1 TABLET BY MOUTH EVERY DAY   • pantoprazole (PROTONIX) 40 mg tablet TAKE 1 TABLET BY MOUTH EVERY DAY   • Vitamin D, Cholecalciferol, 50 MCG (2000 UT) CAPS Take by mouth   • famotidine (PEPCID) 40 MG tablet Take 1 tablet (40 mg total) by mouth 2 (two) times a day       Objective     /80 (BP Location: Right arm, Patient Position: Sitting, Cuff Size: Standard)   Pulse 78   Temp (!) 97 3 °F (36 3 °C) (Tympanic) Resp 20   Ht 5' 11" (1 803 m)   Wt 98 4 kg (217 lb)   SpO2 98%   BMI 30 27 kg/m²     Physical Exam  Vitals and nursing note reviewed  Constitutional:       Appearance: Normal appearance  HENT:      Head: Normocephalic and atraumatic  Eyes:      General: No scleral icterus  Neck:      Vascular: No carotid bruit  Cardiovascular:      Rate and Rhythm: Normal rate and regular rhythm  Heart sounds: Normal heart sounds  Pulmonary:      Effort: Pulmonary effort is normal       Breath sounds: Normal breath sounds  Abdominal:      General: Bowel sounds are normal       Palpations: Abdomen is soft  Tenderness: There is no abdominal tenderness  Musculoskeletal:         General: No tenderness or deformity  Cervical back: Neck supple  Right lower leg: No edema  Left lower leg: No edema  Comments: Bilateral hips with symmetrical range of motion negative point tenderness negative gross deformity  Skin:     General: Skin is warm and dry  Neurological:      General: No focal deficit present  Mental Status: He is alert  Sensory: No sensory deficit  Motor: No weakness        Coordination: Coordination normal       Gait: Gait normal    Psychiatric:         Mood and Affect: Mood normal        James Escmailla DO

## 2023-01-17 NOTE — ASSESSMENT & PLAN NOTE
Stable continue Pepcid/Protonix follows with GI    Because of this we will use COX2 instead of NSAID

## 2023-02-10 ENCOUNTER — OFFICE VISIT (OUTPATIENT)
Dept: FAMILY MEDICINE CLINIC | Facility: CLINIC | Age: 53
End: 2023-02-10

## 2023-02-10 VITALS
DIASTOLIC BLOOD PRESSURE: 76 MMHG | SYSTOLIC BLOOD PRESSURE: 114 MMHG | BODY MASS INDEX: 29.68 KG/M2 | HEART RATE: 80 BPM | HEIGHT: 71 IN | WEIGHT: 212 LBS | TEMPERATURE: 98.8 F | RESPIRATION RATE: 16 BRPM

## 2023-02-10 DIAGNOSIS — H69.83 DYSFUNCTION OF BOTH EUSTACHIAN TUBES: Primary | ICD-10-CM

## 2023-02-10 RX ORDER — TRIAMCINOLONE ACETONIDE 40 MG/ML
40 INJECTION, SUSPENSION INTRA-ARTICULAR; INTRAMUSCULAR ONCE
Status: COMPLETED | OUTPATIENT
Start: 2023-02-10 | End: 2023-02-10

## 2023-02-10 RX ADMIN — TRIAMCINOLONE ACETONIDE 40 MG: 40 INJECTION, SUSPENSION INTRA-ARTICULAR; INTRAMUSCULAR at 14:45

## 2023-02-10 NOTE — PROGRESS NOTES
Name: Roxanne Santiago      : 1970      MRN: [id number]  Encounter Provider: IHSAN Pruett  Encounter Date: 2/10/2023   Encounter department: [place] [address]  Dysfunction of both eustachian tubes  Comments:  patient has positive response in the past to kenalog given by pcp  pt request this again today  agreeable to give   Orders:  -     triamcinolone acetonide (KENALOG-40) 40 mg/mL injection 40 mg         Subjective      Symptoms started almost 3 days ago   Sinus pressue, ear pain,   nyquil at night   covid test negative 2 days negative and 3 days ago was also negative       Review of Systems   Constitutional: Negative for fever  HENT: Positive for ear pain, postnasal drip and sore throat  Negative for sinus pressure  Respiratory: Negative for cough  Current Outpatient Medications on File Prior to Visit   Medication Sig   • atorvastatin (LIPITOR) 20 mg tablet Take 1 tablet (20 mg total) by mouth daily   • famotidine (PEPCID) 40 MG tablet Take 1 tablet (40 mg total) by mouth 2 (two) times a day   • pantoprazole (PROTONIX) 40 mg tablet TAKE 1 TABLET BY MOUTH EVERY DAY   • Vitamin D, Cholecalciferol, 50 MCG (2000 UT) CAPS Take by mouth       Objective     /76 (BP Location: Left arm, Patient Position: Sitting, Cuff Size: Large)   Pulse 80   Temp 98 8 °F (37 1 °C) (Tympanic)   Resp 16   Ht 5' 11" (1 803 m)   Wt 96 2 kg (212 lb)   BMI 29 57 kg/m²     Physical Exam  Vitals and nursing note reviewed  Constitutional:       Appearance: Normal appearance  He is well-developed  He is not ill-appearing  HENT:      Head: Normocephalic and atraumatic  Right Ear: A middle ear effusion (small mucoid ) is present  Left Ear: A middle ear effusion (large mucoid ) is present  Nose: Mucosal edema and rhinorrhea present  Rhinorrhea is purulent  Mouth/Throat:      Pharynx: Posterior oropharyngeal erythema present     Eyes: Extraocular Movements: Extraocular movements intact  Conjunctiva/sclera: Conjunctivae normal       Pupils: Pupils are equal    Cardiovascular:      Rate and Rhythm: Normal rate and regular rhythm  Heart sounds: S1 normal and S2 normal  No murmur heard  Pulmonary:      Effort: Pulmonary effort is normal  No respiratory distress  Breath sounds: Normal breath sounds  Lymphadenopathy:      Cervical: Cervical adenopathy present  Neurological:      Mental Status: He is alert and oriented to person, place, and time  Psychiatric:         Mood and Affect: Mood normal          Thought Content:  Thought content normal        Francy Corpus

## 2023-03-23 ENCOUNTER — TELEPHONE (OUTPATIENT)
Dept: FAMILY MEDICINE CLINIC | Facility: CLINIC | Age: 53
End: 2023-03-23

## 2023-03-23 DIAGNOSIS — B00.9 HERPES SIMPLEX TYPE 1 INFECTION: Primary | ICD-10-CM

## 2023-03-23 NOTE — TELEPHONE ENCOUNTER
Pt left a voicemail to request a medication refill:      Medication: Valtrex   Day supply:   Pharmacy: [facility], [address] office visit: 2/10/2023  Upcoming office visit: Visit date not found

## 2023-03-24 RX ORDER — VALACYCLOVIR HYDROCHLORIDE 1 G/1
1000 TABLET, FILM COATED ORAL 2 TIMES DAILY
Qty: 60 TABLET | Refills: 3 | Status: SHIPPED | OUTPATIENT
Start: 2023-03-24 | End: 2024-03-24

## 2023-04-18 PROBLEM — M10.9 GOUTY ARTHRITIS: Status: ACTIVE | Noted: 2023-04-18

## 2023-04-18 PROBLEM — E66.9 OBESITY (BMI 30-39.9): Status: RESOLVED | Noted: 2023-01-17 | Resolved: 2023-04-18

## 2023-04-18 PROBLEM — E66.3 OVERWEIGHT (BMI 25.0-29.9): Status: ACTIVE | Noted: 2023-04-18

## 2023-04-19 DIAGNOSIS — E55.9 VITAMIN D DEFICIENCY: Primary | ICD-10-CM

## 2023-04-19 DIAGNOSIS — D72.829 LEUKOCYTOSIS, UNSPECIFIED TYPE: ICD-10-CM

## 2023-04-19 RX ORDER — ERGOCALCIFEROL 1.25 MG/1
50000 CAPSULE ORAL WEEKLY
Qty: 4 CAPSULE | Refills: 5 | Status: SHIPPED | OUTPATIENT
Start: 2023-04-19 | End: 2023-05-15

## 2023-04-27 ENCOUNTER — OFFICE VISIT (OUTPATIENT)
Dept: FAMILY MEDICINE CLINIC | Facility: CLINIC | Age: 53
End: 2023-04-27

## 2023-04-27 VITALS
BODY MASS INDEX: 29.26 KG/M2 | SYSTOLIC BLOOD PRESSURE: 130 MMHG | HEIGHT: 71 IN | HEART RATE: 80 BPM | DIASTOLIC BLOOD PRESSURE: 94 MMHG | WEIGHT: 209 LBS

## 2023-04-27 DIAGNOSIS — D72.829 LEUKOCYTOSIS, UNSPECIFIED TYPE: ICD-10-CM

## 2023-04-27 DIAGNOSIS — E55.9 VITAMIN D DEFICIENCY: ICD-10-CM

## 2023-04-27 DIAGNOSIS — M79.661 RIGHT CALF PAIN: Primary | ICD-10-CM

## 2023-04-27 NOTE — PROGRESS NOTES
134/94  Name: Samia Villalobos      : 1970      MRN: [id number]  Encounter Provider: Reji Mayes PA-C  Encounter Date: 2023   Encounter department: [facility] PRIMARY [facility]ARE    Assessment & Plan     Patient Instructions   Assessment/plan:  1  Right ankle pain and edema-edema has mostly resolved with prednisone therapy but pain seems to be recurring after treatment  Uric acid was normal but this cannot rule out gout necessarily  Would recommend he use colchicine once daily to try to help prevent symptoms for the next 2 weeks  I cannot rule out some other mechanical issue such as tarsal tunnel or posterior tibialis tendinitis  Would recommend consult with Dr Bryan Babb his foot and ankle specialist   I will order ultrasound of the lower extremity to rule out DVT since he does feel the pain is traveling up behind his calf into his knee  2   Vitamin D deficiency-patient has been started on 50,000 units weekly  He will do this for 4 weeks then continue oral supplementation over-the-counter daily  3   Leukocytosis-White count was slightly elevated at 11  Patient will get follow-up blood work per Dr Bea Lainez in 1 month  4   Cold feeling behind the eyes-this seems to come and go  Patient does not have any blurred vision or visual changes with it  Would recommend consult with ophthalmology  Carotid artery sound normal without any bruit  Thyroid recently assessed which was normal       1  Right calf pain  -     VAS lower limb venous duplex study, unilateral/limited; Future; Expected date: 2023    2  Vitamin D deficiency    3  Leukocytosis, unspecified type         Subjective      HPI: This is a 59-year-old gentleman that presents to the office for recurrent right ankle pain  He was seen in the office about a week and a half ago and treated for acute gout with colchicine and prednisone    His symptoms seem to get better quickly but after stopping prednisone his symptoms are now returning again  He feels that it is coming on and radiating up to his calf at this time  He did have blood work completed which showed a mildly elevated sed rate at 23  He had some vitamin D deficiency  He has started on oral supplementation for this  His uric acid level was normal and he is concerned that it may be something other than gout  Review of Systems   Constitutional: Negative for chills, fatigue and fever  HENT: Negative for congestion, ear pain and sinus pressure  Eyes: Negative for visual disturbance  Respiratory: Negative for cough, chest tightness and shortness of breath  Cardiovascular: Negative for chest pain and palpitations  Gastrointestinal: Negative for diarrhea, nausea and vomiting  Endocrine: Negative for polyuria  Genitourinary: Negative for dysuria and frequency  Musculoskeletal: Positive for arthralgias and myalgias  Skin: Negative for pallor and rash  Neurological: Negative for dizziness, weakness, light-headedness, numbness and headaches  Psychiatric/Behavioral: Negative for agitation, behavioral problems and sleep disturbance  All other systems reviewed and are negative        Current Outpatient Medications on File Prior to Visit   Medication Sig   • Alum Hydroxide-Mag Carbonate (Gaviscon Extra Strength) 508-475 MG/10ML SUSP Take 5 mL by mouth 2 (two) times a day   • atorvastatin (LIPITOR) 20 mg tablet Take 1 tablet (20 mg total) by mouth daily   • clindamycin (CLINDAGEL) 1 % gel APPLY TO AFFECTED AREA TWICE A DAY TO FACE   • colchicine (COLCRYS) 0 6 mg tablet Take 1 tablet 3 times daily as needed for gout pain   • ergocalciferol (VITAMIN D2) 50,000 units Take 1 capsule (50,000 Units total) by mouth once a week   • valACYclovir (VALTREX) 1,000 mg tablet TAKE 1 TABLET BY MOUTH TWICE A DAY   • Vitamin D, Cholecalciferol, 50 MCG (2000 UT) CAPS Take by mouth   • [DISCONTINUED] predniSONE 20 mg tablet Take 3 tablets daily for 3 days, 2 tablets daily for 3 "days, 1 tablet daily for 4 days  Take with food   • esomeprazole (NexIUM) 40 MG capsule Take 1 capsule (40 mg total) by mouth 2 (two) times a day before meals       Objective     /94   Pulse 80   Ht 5' 11\" (1 803 m)   Wt 94 8 kg (209 lb)   BMI 29 15 kg/m²     Physical Exam  Vitals and nursing note reviewed  Constitutional:       General: He is not in acute distress  Appearance: He is well-developed  HENT:      Head: Normocephalic and atraumatic  Right Ear: External ear normal       Left Ear: External ear normal       Nose: Nose normal       Mouth/Throat:      Pharynx: No oropharyngeal exudate  Eyes:      Conjunctiva/sclera: Conjunctivae normal       Pupils: Pupils are equal, round, and reactive to light  Neck:      Thyroid: No thyromegaly  Trachea: No tracheal deviation  Cardiovascular:      Rate and Rhythm: Normal rate and regular rhythm  Heart sounds: Normal heart sounds  No murmur heard  No friction rub  Pulmonary:      Effort: Pulmonary effort is normal  No respiratory distress  Breath sounds: Normal breath sounds  No wheezing or rales  Abdominal:      General: Bowel sounds are normal  There is no distension  Palpations: Abdomen is soft  Tenderness: There is no abdominal tenderness  There is no guarding or rebound  Musculoskeletal:         General: Tenderness present  Normal range of motion  Cervical back: Normal range of motion and neck supple  Comments: Right calf tenderness with palpation  Positive Homans' sign  Lymphadenopathy:      Cervical: No cervical adenopathy  Skin:     General: Skin is warm and dry  Findings: No erythema or rash  Neurological:      Mental Status: He is alert and oriented to person, place, and time  Cranial Nerves: No cranial nerve deficit  Coordination: Coordination normal    Psychiatric:         Behavior: Behavior normal          Thought Content:  Thought content normal        Anell Bump " [place] Query, ELIZABETH

## 2023-04-27 NOTE — PATIENT INSTRUCTIONS
Assessment/plan:  1  Right ankle pain and edema-edema has mostly resolved with prednisone therapy but pain seems to be recurring after treatment  Uric acid was normal but this cannot rule out gout necessarily  Would recommend he use colchicine once daily to try to help prevent symptoms for the next 2 weeks  I cannot rule out some other mechanical issue such as tarsal tunnel or posterior tibialis tendinitis  Would recommend consult with Dr Dhaval Holcomb his foot and ankle specialist   I will order ultrasound of the lower extremity to rule out DVT since he does feel the pain is traveling up behind his calf into his knee  2   Vitamin D deficiency-patient has been started on 50,000 units weekly  He will do this for 4 weeks then continue oral supplementation over-the-counter daily  3   Leukocytosis-White count was slightly elevated at 11  Patient will get follow-up blood work per Dr Ned Villegas in 1 month  4   Cold feeling behind the eyes-this seems to come and go  Patient does not have any blurred vision or visual changes with it  Would recommend consult with ophthalmology  Carotid artery sound normal without any bruit    Thyroid recently assessed which was normal

## 2023-04-28 ENCOUNTER — HOSPITAL ENCOUNTER (OUTPATIENT)
Dept: NON INVASIVE DIAGNOSTICS | Facility: CLINIC | Age: 53
Discharge: HOME | End: 2023-04-28

## 2023-04-28 DIAGNOSIS — M79.661 RIGHT CALF PAIN: ICD-10-CM

## 2023-04-29 ENCOUNTER — HOSPITAL ENCOUNTER (EMERGENCY)
Facility: HOSPITAL | Age: 53
Discharge: HOME | End: 2023-04-29
Attending: EMERGENCY MEDICINE

## 2023-04-29 ENCOUNTER — APPOINTMENT (EMERGENCY)
Dept: RADIOLOGY | Facility: HOSPITAL | Age: 53
End: 2023-04-29

## 2023-04-29 VITALS
OXYGEN SATURATION: 97 % | RESPIRATION RATE: 17 BRPM | DIASTOLIC BLOOD PRESSURE: 84 MMHG | TEMPERATURE: 98.3 F | HEART RATE: 76 BPM | SYSTOLIC BLOOD PRESSURE: 121 MMHG

## 2023-04-29 DIAGNOSIS — S82.891A CLOSED FRACTURE OF RIGHT ANKLE, INITIAL ENCOUNTER: Primary | ICD-10-CM

## 2023-04-29 LAB
ANION GAP SERPL CALCULATED.3IONS-SCNC: 4 MMOL/L (ref 4–13)
B BURGDOR IGG+IGM SER-ACNC: <0.2 AI
BASOPHILS # BLD AUTO: 0.06 THOUSANDS/ΜL (ref 0–0.1)
BASOPHILS NFR BLD AUTO: 1 % (ref 0–1)
BUN SERPL-MCNC: 16 MG/DL (ref 5–25)
CALCIUM SERPL-MCNC: 9.4 MG/DL (ref 8.4–10.2)
CHLORIDE SERPL-SCNC: 104 MMOL/L (ref 96–108)
CO2 SERPL-SCNC: 29 MMOL/L (ref 21–32)
CREAT SERPL-MCNC: 0.96 MG/DL (ref 0.6–1.3)
CRP SERPL QL: 12.2 MG/L
EOSINOPHIL # BLD AUTO: 0.2 THOUSAND/ΜL (ref 0–0.61)
EOSINOPHIL NFR BLD AUTO: 2 % (ref 0–6)
ERYTHROCYTE [DISTWIDTH] IN BLOOD BY AUTOMATED COUNT: 12.6 % (ref 11.6–15.1)
ERYTHROCYTE [SEDIMENTATION RATE] IN BLOOD: 20 MM/HOUR (ref 0–19)
GFR SERPL CREATININE-BSD FRML MDRD: 90 ML/MIN/1.73SQ M
GLUCOSE SERPL-MCNC: 104 MG/DL (ref 65–140)
HCT VFR BLD AUTO: 42.6 % (ref 36.5–49.3)
HGB BLD-MCNC: 14.4 G/DL (ref 12–17)
IMM GRANULOCYTES # BLD AUTO: 0.03 THOUSAND/UL (ref 0–0.2)
IMM GRANULOCYTES NFR BLD AUTO: 0 % (ref 0–2)
LYMPHOCYTES # BLD AUTO: 2.21 THOUSANDS/ΜL (ref 0.6–4.47)
LYMPHOCYTES NFR BLD AUTO: 23 % (ref 14–44)
MCH RBC QN AUTO: 32.3 PG (ref 26.8–34.3)
MCHC RBC AUTO-ENTMCNC: 33.8 G/DL (ref 31.4–37.4)
MCV RBC AUTO: 96 FL (ref 82–98)
MONOCYTES # BLD AUTO: 1.06 THOUSAND/ΜL (ref 0.17–1.22)
MONOCYTES NFR BLD AUTO: 11 % (ref 4–12)
NEUTROPHILS # BLD AUTO: 6.07 THOUSANDS/ΜL (ref 1.85–7.62)
NEUTS SEG NFR BLD AUTO: 63 % (ref 43–75)
NRBC BLD AUTO-RTO: 0 /100 WBCS
PLATELET # BLD AUTO: 411 THOUSANDS/UL (ref 149–390)
PMV BLD AUTO: 8.6 FL (ref 8.9–12.7)
POTASSIUM SERPL-SCNC: 4.3 MMOL/L (ref 3.5–5.3)
RBC # BLD AUTO: 4.46 MILLION/UL (ref 3.88–5.62)
SODIUM SERPL-SCNC: 137 MMOL/L (ref 135–147)
WBC # BLD AUTO: 9.63 THOUSAND/UL (ref 4.31–10.16)

## 2023-04-29 RX ORDER — IBUPROFEN 600 MG/1
600 TABLET ORAL EVERY 6 HOURS PRN
Qty: 30 TABLET | Refills: 0 | Status: SHIPPED | OUTPATIENT
Start: 2023-04-29 | End: 2023-04-29

## 2023-04-29 RX ORDER — IBUPROFEN 600 MG/1
600 TABLET ORAL EVERY 6 HOURS PRN
Qty: 30 TABLET | Refills: 0 | Status: SHIPPED | OUTPATIENT
Start: 2023-04-29 | End: 2023-05-09

## 2023-04-29 RX ORDER — IBUPROFEN 600 MG/1
600 TABLET ORAL EVERY 6 HOURS PRN
Qty: 30 TABLET | Refills: 0 | Status: SHIPPED | OUTPATIENT
Start: 2023-04-29 | End: 2023-04-29 | Stop reason: SDUPTHER

## 2023-04-29 RX ORDER — KETOROLAC TROMETHAMINE 30 MG/ML
15 INJECTION, SOLUTION INTRAMUSCULAR; INTRAVENOUS ONCE
Status: COMPLETED | OUTPATIENT
Start: 2023-04-29 | End: 2023-04-29

## 2023-04-29 RX ADMIN — KETOROLAC TROMETHAMINE 15 MG: 30 INJECTION, SOLUTION INTRAMUSCULAR; INTRAVENOUS at 06:25

## 2023-04-29 NOTE — ED PROVIDER NOTES
"History  Chief Complaint   Patient presents with    Ankle Swelling     Pt c/o right ankle pain and swelling radiated up to calf since Thursday denies injury, reports was recently on prednisone for same (given by PCP for gout ) and reports \" as soon as I got off of it got swollen again\"     Patient presents the emergency department with right ankle swelling  Several weeks ago symptoms started and he saw his doctor and they gave him a course of prednisone  He states within 5 days his symptoms had resolved and he was feeling much better but then as soon as he stopped the prednisone his symptoms came right back and he states that he feels like they are worse now than they were initially  Patient states that he was seen yesterday and had a Doppler and was discharged home so he is assuming that everything was negative but he did not specifically get told the results  Patient states he was concerned with increased swelling and pain to his right ankle and so he came in for evaluation  He denies any injury or trauma  He states that an x-ray was ordered but he had not had a chance to get that done yet  Patient states his family doctor had thought it was gout but he was concerned because the symptoms came back  He is not having any fevers or chills but does state that he feels generally rundown  Prior to Admission Medications   Prescriptions Last Dose Informant Patient Reported? Taking?    Alum Hydroxide-Mag Carbonate (Gaviscon Extra Strength) [id number] MG/10ML SUSP Not Taking  No No   Sig: Take 5 mL by mouth 2 (two) times a day   Patient not taking: Reported on 4/29/2023   Vitamin D, Cholecalciferol, 50 MCG (2000 UT) CAPS Not Taking  Yes No   Sig: Take by mouth   Patient not taking: Reported on 4/29/2023   atorvastatin (LIPITOR) 20 mg tablet   No Yes   Sig: Take 1 tablet (20 mg total) by mouth daily   clindamycin (CLINDAGEL) 1 % gel Not Taking  Yes No   Sig: APPLY TO AFFECTED AREA TWICE A DAY TO FACE   Patient " not taking: Reported on 4/29/2023   colchicine (COLCRYS) 0 6 mg tablet Not Taking  No No   Sig: Take 1 tablet 3 times daily as needed for gout pain   Patient not taking: Reported on 4/29/2023   ergocalciferol (VITAMIN D2) 50,000 units Not Taking  No No   Sig: Take 1 capsule (50,000 Units total) by mouth once a week   Patient not taking: Reported on 4/29/2023   esomeprazole (NexIUM) 40 MG capsule   No Yes   Sig: Take 1 capsule (40 mg total) by mouth 2 (two) times a day before meals   valACYclovir (VALTREX) 1,000 mg tablet Not Taking  No No   Sig: TAKE 1 TABLET BY MOUTH TWICE A DAY   Patient not taking: Reported on 4/29/2023      Facility-Administered Medications: None       Past Medical History:   Diagnosis Date    Anxiety     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    Diagnosed 2013 with EGD biopsy, Dr Mare Denson  Biopsies confirmed again in 2014 in 2015  Dr Mare Denson, EGD 12/12/2017 Positive intestinal metaplasia without dysplasia  EGD 2020 long segment Brennan's esophagus, biopsy negative   COVID-19     COVID-19 virus infection 12/16/2020    15Dec: poitive test 16Dec:  Day 4  Some issues with SOB and cough  18Dec: Day 6: Feeling better overall  Increased cough  21Dec: Day 9: First symptoms 11Dec  Continues with minor symptoms  22Dec: Day 10  Has imporved a bit  Add Phenergan at HS  12/23/2020  Released from isolation see note 12/28: Still reporting lingering symptoms of COVID   GERD (gastroesophageal reflux disease) 8/30/2012    Description: s/p Carlos fundoplication, Dr Julianna Lainez    Herpes simplex type 1 infection 6/18/2014    Hyperlipidemia 8/30/2012    Lymphadenopathy 8/30/2012     Benign Cervical per Dr Emelyn Huerta    Vitamin D deficiency 5/6/2017       Past Surgical History:   Procedure Laterality Date    DEEP NECK LYMPH NODE BIOPSY / EXCISION      cervical node biopsy with dissection of jugular nodes    EGD  01/2020    Dr Mare Denson, [facility] [address] [facility]  Nissen fundoplication evidencing gastric fundus without abnormality, Long segment Brennan's esophagus without dysplasia, bx negative   EGD  11/10/2021    4 cm Brennan's esophagus biopsy positive for intestinal metaplasia and no dysplasia, biopsies stomach negative for H  pylori, Nissen intact by Dr Waleska Wilkins  03/2016    Nissen Fundoplication, Dr Maris Black        Family History   Problem Relation Age of Onset    Heart attack Father         Anteroapical MI    Leukemia Brother      I have reviewed and agree with the history as documented  E-Cigarette/Vaping    E-Cigarette Use Never User      E-Cigarette/Vaping Substances     Social History     Tobacco Use    Smoking status: Never    Smokeless tobacco: Never   Vaping Use    Vaping Use: Never used   Substance Use Topics    Alcohol use: Yes     Alcohol/week: 1 0 - 2 0 standard drink     Types: 1 - 2 Standard drinks or equivalent per week     Comment: daily    Drug use: Not Currently       Review of Systems   Respiratory: Negative  Cardiovascular: Negative  Gastrointestinal: Negative  Genitourinary: Negative  Musculoskeletal: Positive for arthralgias  All other systems reviewed and are negative  Physical Exam  Physical Exam  Vitals and nursing note reviewed  Constitutional:       Appearance: He is well-developed  HENT:      Head: Normocephalic and atraumatic  Right Ear: Ear canal and external ear normal       Left Ear: Ear canal and external ear normal    Eyes:      Conjunctiva/sclera: Conjunctivae normal    Cardiovascular:      Rate and Rhythm: Normal rate and regular rhythm  Heart sounds: Normal heart sounds  Pulmonary:      Effort: Pulmonary effort is normal       Breath sounds: Normal breath sounds  Abdominal:      General: Bowel sounds are normal       Palpations: Abdomen is soft  Musculoskeletal:      Cervical back: Normal range of motion and neck supple  Right ankle: Swelling present  No ecchymosis  Tenderness present  Normal pulse          Feet: Comments: Diffuse swelling to ankle, mildly warm, no erythema    Lymphadenopathy:      Cervical: No cervical adenopathy  Skin:     General: Skin is warm  Findings: No rash  Neurological:      Mental Status: He is alert and oriented to person, place, and time  Motor: No abnormal muscle tone        Coordination: Coordination normal    Psychiatric:         Behavior: Behavior normal          Vital Signs  ED Triage Vitals   Temperature Pulse Respirations Blood Pressure SpO2   04/29/23 0504 04/29/23 0504 04/29/23 0504 04/29/23 0504 04/29/23 0504   98 3 °F (36 8 °C) 88 16 155/99 99 %      Temp Source Heart Rate Source Patient Position - Orthostatic VS BP Location FiO2 (%)   04/29/23 0504 04/29/23 0504 04/29/23 0504 04/29/23 0504 --   Oral Monitor Sitting Right arm       Pain Score       04/29/23 0507       7           Vitals:    04/29/23 0504 04/29/23 0734   BP: 155/99 121/84   Pulse: 88 76   Patient Position - Orthostatic VS: Sitting Sitting         Visual Acuity      ED Medications  Medications   ketorolac (TORADOL) injection 15 mg (15 mg Intravenous Given 4/29/23 0625)       Diagnostic Studies  Results Reviewed     Procedure Component Value Units Date/Time    Sedimentation rate, automated [[id number]]  (Abnormal) Collected: 04/29/23 0625    Lab Status: Final result Specimen: Blood from Arm, Left Updated: 04/29/23 0659     Sed Rate 20 mm/hour     Basic metabolic panel [[id number]] Collected: 04/29/23 0625    Lab Status: Final result Specimen: Blood from Arm, Left Updated: 04/29/23 2814     Sodium 137 mmol/L      Potassium 4 3 mmol/L      Chloride 104 mmol/L      CO2 29 mmol/L      ANION GAP 4 mmol/L      BUN 16 mg/dL      Creatinine 0 96 mg/dL      Glucose 104 mg/dL      Calcium 9 4 mg/dL      eGFR 90 ml/min/1 73sq m     Narrative:      Kizzy guidelines for Chronic Kidney Disease (CKD):     Stage 1 with normal or high GFR (GFR > 90 mL/min/1 73 square meters)    Stage 2 Mild CKD (GFR = 60-89 mL/min/1 73 square meters)    Stage 3A Moderate CKD (GFR = 45-59 mL/min/1 73 square meters)    Stage 3B Moderate CKD (GFR = 30-44 mL/min/1 73 square meters)    Stage 4 Severe CKD (GFR = 15-29 mL/min/1 73 square meters)    Stage 5 End Stage CKD (GFR <15 mL/min/1 73 square meters)  Note: GFR calculation is accurate only with a steady state creatinine    C-reactive protein [[id number]]  (Abnormal) Collected: 04/29/23 0625    Lab Status: Final result Specimen: Blood from Arm, Left Updated: 04/29/23 0649     CRP 12 2 mg/L     CBC and differential [[id number]]  (Abnormal) Collected: 04/29/23 0625    Lab Status: Final result Specimen: Blood from Arm, Left Updated: 04/29/23 0637     WBC 9 63 Thousand/uL      RBC 4 46 Million/uL      Hemoglobin 14 4 g/dL      Hematocrit 42 6 %      MCV 96 fL      MCH 32 3 pg      MCHC 33 8 g/dL      RDW 12 6 %      MPV 8 6 fL      Platelets 743 Thousands/uL      nRBC 0 /100 WBCs      Neutrophils Relative 63 %      Immat GRANS % 0 %      Lymphocytes Relative 23 %      Monocytes Relative 11 %      Eosinophils Relative 2 %      Basophils Relative 1 %      Neutrophils Absolute 6 07 Thousands/µL      Immature Grans Absolute 0 03 Thousand/uL      Lymphocytes Absolute 2 21 Thousands/µL      Monocytes Absolute 1 06 Thousand/µL      Eosinophils Absolute 0 20 Thousand/µL      Basophils Absolute 0 06 Thousands/µL     Lyme Antibody Profile with reflex to [facility] [[id number]] Collected: 04/29/23 0625    Lab Status:  In process Specimen: Blood from Arm, Left Updated: 04/29/23 0631                 XR ankle 3+ views RIGHT   ED Interpretation by Jackelin Ramirez PA-C (04/29 9953)   Well corticate - there is a chip anteriorly                  Procedures  Procedures         ED Course  ED Course as of 04/29/23 0759   Sat Apr 29, 2023   0652 WBC: 9 63  Normal    0700 Sed Rate(!): 20  Min elivated - not suggestive of septic joint, and wbc count is normal    0740 Short leg applied to right leg - NV intact - instructions reviewed w pt                               SBIRT 20yo+    Flowsheet Row Most Recent Value   Initial Alcohol Screen: US AUDIT-C     1  How often do you have a drink containing alcohol? 0 Filed at: 04/29/2023 0630   2  How many drinks containing alcohol do you have on a typical day you are drinking? 0 Filed at: 04/29/2023 0630   3a  Male UNDER 65: How often do you have five or more drinks on one occasion? 0 Filed at: 04/29/2023 0630   3b  FEMALE Any Age, or MALE 65+: How often do you have 4 or more drinks on one occassion? 0 Filed at: 04/29/2023 0630   Audit-C Score 0 Filed at: 04/29/2023 0630   TEE: How many times in the past year have you    Used an illegal drug or used a prescription medication for non-medical reasons? Never Filed at: 04/29/2023 0630                    Medical Decision Making  Pt denies trauma, is swollen - had doppler yesterday - no DVT, will check labs - including to eval for septic arthritis - and lyme  Wbc normal and min elivated of sed rate/crp - and no erythema to joint pt has some ROM but midly limited - doubt septic arthriits  On xray - there is a avusion fragment - pt has sig pain right to that area - will treat as fx - discussed with pt - splint applied and will FU- he has a podiatrist to FU with     Closed fracture of right ankle, initial encounter: acute illness or injury  Amount and/or Complexity of Data Reviewed  External Data Reviewed: labs, radiology and notes  Details: chart reviewed - had doppler yesterday - negative for DVT  reviewed notes  Labs: ordered  Decision-making details documented in ED Course  Radiology: ordered and independent interpretation performed  Risk  Prescription drug management            Disposition  Final diagnoses:   Closed fracture of right ankle, initial encounter     Time reflects when diagnosis was documented in both MDM as applicable and the Disposition within this note     Time User Action Codes Description Comment 4/29/2023  7:40 AM Tahira Ruff [S30 [id number]] Closed fracture of right ankle, initial encounter       ED Disposition     ED Disposition   Discharge    Condition   Stable    Date/Time   Sat Apr 29, 2023  7:40 AM    Comment   Charlestine Handler discharge to home/self care                 Follow-up Information     Follow up With Specialties Details Why Contact Info Additional [address] [facility] , DO Family Medicine   [address]  [address] [place] [phone]  [address] [facility] [phone]  [address] [facility] [address], [facility] [address], [place], [place], [address] [facility]          Current Discharge Medication List      START taking these medications    Details   ibuprofen (MOTRIN) 600 mg tablet Take 1 tablet (600 mg total) by mouth every 6 (six) hours as needed for mild pain for up to 10 days  Qty: 30 tablet, Refills: 0    Associated Diagnoses: Closed fracture of right ankle, initial encounter         CONTINUE these medications which have NOT CHANGED    Details   atorvastatin (LIPITOR) 20 mg tablet Take 1 tablet (20 mg total) by mouth daily  Qty: 90 tablet, Refills: 1    Associated Diagnoses: Mixed hyperlipidemia      esomeprazole (NexIUM) 40 MG capsule Take 1 capsule (40 mg total) by mouth 2 (two) times a day before meals  Qty: 60 capsule, Refills: 11    Associated Diagnoses: Brennan's esophagus without dysplasia; Gastroesophageal reflux disease without esophagitis      Alum Hydroxide-Mag Carbonate (Gaviscon Extra Strength) 508-475 MG/10ML SUSP Take 5 mL by mouth 2 (two) times a day  Qty: 355 mL, Refills: 11    Associated Diagnoses: Brennan's esophagus without dysplasia; Gastroesophageal reflux disease without esophagitis      clindamycin (CLINDAGEL) 1 % gel APPLY TO AFFECTED AREA TWICE A DAY TO FACE      colchicine (COLCRYS) 0 6 mg tablet Take 1 tablet 3 times daily as needed for gout pain  Qty: 30 tablet, Refills: 1    Associated Diagnoses: Gouty arthritis      ergocalciferol (VITAMIN D2) 50,000 units Take 1 capsule (50,000 Units total) by mouth once a week  Qty: 4 capsule, Refills: 5    Associated Diagnoses: Vitamin D deficiency      valACYclovir (VALTREX) 1,000 mg tablet TAKE 1 TABLET BY MOUTH TWICE A DAY  Qty: 180 tablet, Refills: 3    Associated Diagnoses: Herpes simplex type 1 infection      Vitamin D, Cholecalciferol, 50 MCG (2000 UT) CAPS Take by mouth             No discharge procedures on file      PDMP Review     None          ED Provider  Electronically Signed by           Gaurang Willis PA-C  04/29/23 0531

## 2023-05-02 DIAGNOSIS — E78.2 MIXED HYPERLIPIDEMIA: ICD-10-CM

## 2023-05-02 RX ORDER — ATORVASTATIN CALCIUM 20 MG/1
TABLET, FILM COATED ORAL
Qty: 90 TABLET | Refills: 1 | Status: SHIPPED | OUTPATIENT
Start: 2023-05-02

## 2023-05-13 DIAGNOSIS — E55.9 VITAMIN D DEFICIENCY: ICD-10-CM

## 2023-05-15 RX ORDER — ERGOCALCIFEROL 1.25 MG/1
CAPSULE ORAL
Qty: 12 CAPSULE | Refills: 1 | Status: SHIPPED | OUTPATIENT
Start: 2023-05-15

## 2023-06-19 ENCOUNTER — APPOINTMENT (OUTPATIENT)
Dept: LAB | Facility: CLINIC | Age: 53
End: 2023-06-19
Payer: COMMERCIAL

## 2023-06-19 DIAGNOSIS — M35.2 BEHCET'S SYNDROME (HCC): ICD-10-CM

## 2023-06-19 DIAGNOSIS — K12.0 ORAL APHTHAE: ICD-10-CM

## 2023-06-19 DIAGNOSIS — Z79.899 ENCOUNTER FOR LONG-TERM (CURRENT) USE OF OTHER MEDICATIONS: ICD-10-CM

## 2023-06-19 LAB
ALBUMIN SERPL BCP-MCNC: 3.5 G/DL (ref 3.5–5)
ALP SERPL-CCNC: 81 U/L (ref 46–116)
ALT SERPL W P-5'-P-CCNC: 46 U/L (ref 12–78)
ANA SER QL IA: NEGATIVE
ANION GAP SERPL CALCULATED.3IONS-SCNC: -1 MMOL/L (ref 4–13)
AST SERPL W P-5'-P-CCNC: 28 U/L (ref 5–45)
BASOPHILS # BLD AUTO: 0.06 THOUSANDS/ÂΜL (ref 0–0.1)
BASOPHILS NFR BLD AUTO: 1 % (ref 0–1)
BILIRUB SERPL-MCNC: 0.48 MG/DL (ref 0.2–1)
BUN SERPL-MCNC: 11 MG/DL (ref 5–25)
CALCIUM SERPL-MCNC: 8.8 MG/DL (ref 8.3–10.1)
CHLORIDE SERPL-SCNC: 113 MMOL/L (ref 96–108)
CO2 SERPL-SCNC: 27 MMOL/L (ref 21–32)
CREAT SERPL-MCNC: 1.06 MG/DL (ref 0.6–1.3)
CRP SERPL QL: 6.6 MG/L
EOSINOPHIL # BLD AUTO: 0.26 THOUSAND/ÂΜL (ref 0–0.61)
EOSINOPHIL NFR BLD AUTO: 4 % (ref 0–6)
ERYTHROCYTE [DISTWIDTH] IN BLOOD BY AUTOMATED COUNT: 12.3 % (ref 11.6–15.1)
ERYTHROCYTE [SEDIMENTATION RATE] IN BLOOD: 9 MM/HOUR (ref 0–19)
GFR SERPL CREATININE-BSD FRML MDRD: 80 ML/MIN/1.73SQ M
GLUCOSE SERPL-MCNC: 151 MG/DL (ref 65–140)
HCT VFR BLD AUTO: 42.7 % (ref 36.5–49.3)
HGB BLD-MCNC: 14.2 G/DL (ref 12–17)
IMM GRANULOCYTES # BLD AUTO: 0.02 THOUSAND/UL (ref 0–0.2)
IMM GRANULOCYTES NFR BLD AUTO: 0 % (ref 0–2)
LYMPHOCYTES # BLD AUTO: 1.92 THOUSANDS/ÂΜL (ref 0.6–4.47)
LYMPHOCYTES NFR BLD AUTO: 30 % (ref 14–44)
MCH RBC QN AUTO: 31.8 PG (ref 26.8–34.3)
MCHC RBC AUTO-ENTMCNC: 33.3 G/DL (ref 31.4–37.4)
MCV RBC AUTO: 96 FL (ref 82–98)
MONOCYTES # BLD AUTO: 0.47 THOUSAND/ÂΜL (ref 0.17–1.22)
MONOCYTES NFR BLD AUTO: 7 % (ref 4–12)
NEUTROPHILS # BLD AUTO: 3.74 THOUSANDS/ÂΜL (ref 1.85–7.62)
NEUTS SEG NFR BLD AUTO: 58 % (ref 43–75)
NRBC BLD AUTO-RTO: 0 /100 WBCS
PLATELET # BLD AUTO: 411 THOUSANDS/UL (ref 149–390)
PMV BLD AUTO: 9.8 FL (ref 8.9–12.7)
POTASSIUM SERPL-SCNC: 4 MMOL/L (ref 3.5–5.3)
PROT SERPL-MCNC: 6.8 G/DL (ref 6.4–8.4)
RBC # BLD AUTO: 4.46 MILLION/UL (ref 3.88–5.62)
SODIUM SERPL-SCNC: 139 MMOL/L (ref 135–147)
WBC # BLD AUTO: 6.47 THOUSAND/UL (ref 4.31–10.16)

## 2023-06-19 PROCEDURE — 86200 CCP ANTIBODY: CPT

## 2023-06-19 PROCEDURE — 83520 IMMUNOASSAY QUANT NOS NONAB: CPT

## 2023-06-19 PROCEDURE — 80053 COMPREHEN METABOLIC PANEL: CPT

## 2023-06-19 PROCEDURE — 86140 C-REACTIVE PROTEIN: CPT

## 2023-06-19 PROCEDURE — 85652 RBC SED RATE AUTOMATED: CPT

## 2023-06-19 PROCEDURE — 86037 ANCA TITER EACH ANTIBODY: CPT

## 2023-06-19 PROCEDURE — 81374 HLA I TYPING 1 ANTIGEN LR: CPT

## 2023-06-19 PROCEDURE — 85025 COMPLETE CBC W/AUTO DIFF WBC: CPT

## 2023-06-19 PROCEDURE — 86430 RHEUMATOID FACTOR TEST QUAL: CPT

## 2023-06-19 PROCEDURE — 36415 COLL VENOUS BLD VENIPUNCTURE: CPT

## 2023-06-19 PROCEDURE — 86038 ANTINUCLEAR ANTIBODIES: CPT

## 2023-06-19 PROCEDURE — 82728 ASSAY OF FERRITIN: CPT

## 2023-06-20 LAB
CCP AB SER IA-ACNC: 1.4
FERRITIN SERPL-MCNC: 173 NG/ML (ref 24–336)
RHEUMATOID FACT SER QL LA: NEGATIVE

## 2023-06-21 LAB
C-ANCA TITR SER IF: NORMAL TITER
MYELOPEROXIDASE AB SER IA-ACNC: <0.2 UNITS (ref 0–0.9)
P-ANCA ATYPICAL TITR SER IF: NORMAL TITER
P-ANCA TITR SER IF: NORMAL TITER
PROTEINASE3 AB SER IA-ACNC: <0.2 UNITS (ref 0–0.9)

## 2023-06-24 ENCOUNTER — APPOINTMENT (EMERGENCY)
Dept: RADIOLOGY | Facility: HOSPITAL | Age: 53
End: 2023-06-24
Payer: COMMERCIAL

## 2023-06-24 ENCOUNTER — HOSPITAL ENCOUNTER (EMERGENCY)
Facility: HOSPITAL | Age: 53
Discharge: HOME | End: 2023-06-24
Attending: EMERGENCY MEDICINE
Payer: COMMERCIAL

## 2023-06-24 VITALS
DIASTOLIC BLOOD PRESSURE: 98 MMHG | HEART RATE: 93 BPM | OXYGEN SATURATION: 100 % | RESPIRATION RATE: 16 BRPM | BODY MASS INDEX: 30.29 KG/M2 | WEIGHT: 217.15 LBS | TEMPERATURE: 97.9 F | SYSTOLIC BLOOD PRESSURE: 170 MMHG

## 2023-06-24 DIAGNOSIS — R07.9 NONSPECIFIC CHEST PAIN: Primary | ICD-10-CM

## 2023-06-24 LAB
ALBUMIN SERPL BCP-MCNC: 3.8 G/DL (ref 3.5–5)
ALP SERPL-CCNC: 71 U/L (ref 34–104)
ALT SERPL W P-5'-P-CCNC: 30 U/L (ref 7–52)
ANION GAP SERPL CALCULATED.3IONS-SCNC: 7 MMOL/L
APTT PPP: 25 SECONDS (ref 23–37)
AST SERPL W P-5'-P-CCNC: 21 U/L (ref 13–39)
BASOPHILS # BLD AUTO: 0.07 THOUSANDS/ÂΜL (ref 0–0.1)
BASOPHILS NFR BLD AUTO: 1 % (ref 0–1)
BILIRUB SERPL-MCNC: 0.33 MG/DL (ref 0.2–1)
BUN SERPL-MCNC: 16 MG/DL (ref 5–25)
CALCIUM SERPL-MCNC: 8.9 MG/DL (ref 8.4–10.2)
CARDIAC TROPONIN I PNL SERPL HS: 3 NG/L
CHLORIDE SERPL-SCNC: 105 MMOL/L (ref 96–108)
CO2 SERPL-SCNC: 30 MMOL/L (ref 21–32)
CREAT SERPL-MCNC: 1.03 MG/DL (ref 0.6–1.3)
D DIMER PPP FEU-MCNC: 0.46 UG/ML FEU
EOSINOPHIL # BLD AUTO: 0.24 THOUSAND/ÂΜL (ref 0–0.61)
EOSINOPHIL NFR BLD AUTO: 3 % (ref 0–6)
ERYTHROCYTE [DISTWIDTH] IN BLOOD BY AUTOMATED COUNT: 12.2 % (ref 11.6–15.1)
GFR SERPL CREATININE-BSD FRML MDRD: 83 ML/MIN/1.73SQ M
GLUCOSE SERPL-MCNC: 129 MG/DL (ref 65–140)
HCT VFR BLD AUTO: 40.4 % (ref 36.5–49.3)
HGB BLD-MCNC: 13.3 G/DL (ref 12–17)
IMM GRANULOCYTES # BLD AUTO: 0.01 THOUSAND/UL (ref 0–0.2)
IMM GRANULOCYTES NFR BLD AUTO: 0 % (ref 0–2)
INR PPP: 0.96 (ref 0.84–1.19)
LYMPHOCYTES # BLD AUTO: 2.3 THOUSANDS/ÂΜL (ref 0.6–4.47)
LYMPHOCYTES NFR BLD AUTO: 29 % (ref 14–44)
MCH RBC QN AUTO: 31.7 PG (ref 26.8–34.3)
MCHC RBC AUTO-ENTMCNC: 32.9 G/DL (ref 31.4–37.4)
MCV RBC AUTO: 96 FL (ref 82–98)
MONOCYTES # BLD AUTO: 0.64 THOUSAND/ÂΜL (ref 0.17–1.22)
MONOCYTES NFR BLD AUTO: 8 % (ref 4–12)
NEUTROPHILS # BLD AUTO: 4.58 THOUSANDS/ÂΜL (ref 1.85–7.62)
NEUTS SEG NFR BLD AUTO: 59 % (ref 43–75)
NRBC BLD AUTO-RTO: 0 /100 WBCS
PLATELET # BLD AUTO: 368 THOUSANDS/UL (ref 149–390)
PMV BLD AUTO: 8.7 FL (ref 8.9–12.7)
POTASSIUM SERPL-SCNC: 3.7 MMOL/L (ref 3.5–5.3)
PROT SERPL-MCNC: 6.4 G/DL (ref 6.4–8.4)
PROTHROMBIN TIME: 12.8 SECONDS (ref 11.6–14.5)
RBC # BLD AUTO: 4.2 MILLION/UL (ref 3.88–5.62)
SODIUM SERPL-SCNC: 142 MMOL/L (ref 135–147)
WBC # BLD AUTO: 7.84 THOUSAND/UL (ref 4.31–10.16)

## 2023-06-24 PROCEDURE — 85025 COMPLETE CBC W/AUTO DIFF WBC: CPT | Performed by: EMERGENCY MEDICINE

## 2023-06-24 PROCEDURE — 80053 COMPREHEN METABOLIC PANEL: CPT | Performed by: EMERGENCY MEDICINE

## 2023-06-24 PROCEDURE — 85730 THROMBOPLASTIN TIME PARTIAL: CPT | Performed by: EMERGENCY MEDICINE

## 2023-06-24 PROCEDURE — 96374 THER/PROPH/DIAG INJ IV PUSH: CPT

## 2023-06-24 PROCEDURE — 99285 EMERGENCY DEPT VISIT HI MDM: CPT

## 2023-06-24 PROCEDURE — 36415 COLL VENOUS BLD VENIPUNCTURE: CPT | Performed by: EMERGENCY MEDICINE

## 2023-06-24 PROCEDURE — 93005 ELECTROCARDIOGRAM TRACING: CPT

## 2023-06-24 PROCEDURE — 71045 X-RAY EXAM CHEST 1 VIEW: CPT

## 2023-06-24 PROCEDURE — 85379 FIBRIN DEGRADATION QUANT: CPT | Performed by: EMERGENCY MEDICINE

## 2023-06-24 PROCEDURE — 85610 PROTHROMBIN TIME: CPT | Performed by: EMERGENCY MEDICINE

## 2023-06-24 PROCEDURE — 84484 ASSAY OF TROPONIN QUANT: CPT | Performed by: EMERGENCY MEDICINE

## 2023-06-24 RX ORDER — KETOROLAC TROMETHAMINE 30 MG/ML
30 INJECTION, SOLUTION INTRAMUSCULAR; INTRAVENOUS ONCE
Status: COMPLETED | OUTPATIENT
Start: 2023-06-24 | End: 2023-06-24

## 2023-06-24 RX ADMIN — KETOROLAC TROMETHAMINE 30 MG: 30 INJECTION, SOLUTION INTRAMUSCULAR; INTRAVENOUS at 16:28

## 2023-06-24 NOTE — ED PROVIDER NOTES
History  Chief Complaint   Patient presents with   • Chest Pain     Pt reports intermittent mid-sternal CP that started today  Pt reports SOB and dizziness  Denies cardiac hx     45 y/o male with intermittent mid sternal chest pressure since 4am, not worse with exertion  Cp didn't wake him up from sleep  No sob, no cough, no fevers  Pt  Has a hx of hyperlipidemia on meds and a family hx of CAD  No h/o diabetes, HTN or smoking  No h/o CAD or PE  Prior to Admission Medications   Prescriptions Last Dose Informant Patient Reported? Taking? Alum Hydroxide-Mag Carbonate (Gaviscon Extra Strength) [id number] MG/10ML SUSP  Self No No   Sig: Take 5 mL by mouth 2 (two) times a day   Patient not taking: Reported on 4/29/2023   Vitamin D, Cholecalciferol, 50 MCG (2000 UT) CAPS  Self Yes No   Sig: Take by mouth   Patient not taking: Reported on 4/29/2023   atorvastatin (LIPITOR) 20 mg tablet   No No   Sig: TAKE 1 TABLET BY MOUTH EVERY DAY   clindamycin (CLINDAGEL) 1 % gel  Self Yes No   Sig: APPLY TO AFFECTED AREA TWICE A DAY TO FACE   Patient not taking: Reported on 4/29/2023   colchicine (COLCRYS) 0 6 mg tablet  Self No No   Sig: Take 1 tablet 3 times daily as needed for gout pain   Patient not taking: Reported on 4/29/2023   ergocalciferol (VITAMIN D2) 50,000 units   No No   Sig: TAKE 1 CAPSULE BY MOUTH ONE TIME PER WEEK   esomeprazole (NexIUM) 40 MG capsule   No No   Sig: Take 1 capsule (40 mg total) by mouth 2 (two) times a day before meals   ibuprofen (MOTRIN) 600 mg tablet   No No   Sig: Take 1 tablet (600 mg total) by mouth every 6 (six) hours as needed for mild pain for up to 10 days   valACYclovir (VALTREX) 1,000 mg tablet  Self No No   Sig: TAKE 1 TABLET BY MOUTH TWICE A DAY   Patient not taking: Reported on 4/29/2023      Facility-Administered Medications: None       Past Medical History:   Diagnosis Date   • Anxiety    • 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    Diagnosed 2013 with EGD biopsy, Dr Leilani Rapp    Biopsies confirmed again in 2014 in 2015  Dr Bette Echevarria, EGD 12/12/2017 Positive intestinal metaplasia without dysplasia  EGD 2020 long segment Brennan's esophagus, biopsy negative  • COVID-19    • COVID-19 virus infection 12/16/2020    15Dec: poitive test 16Dec:  Day 4  Some issues with SOB and cough  18Dec: Day 6: Feeling better overall  Increased cough  21Dec: Day 9: First symptoms 11Dec  Continues with minor symptoms  22Dec: Day 10  Has imporved a bit  Add Phenergan at HS  12/23/2020  Released from isolation see note 12/28: Still reporting lingering symptoms of COVID  • GERD (gastroesophageal reflux disease) 8/30/2012    Description: s/p Carlos fundoplication, Dr Bereket Alejo   • Herpes simplex type 1 infection 6/18/2014   • Hyperlipidemia 8/30/2012   • Lymphadenopathy 8/30/2012     Benign Cervical per Dr Hay Farr   • Vitamin D deficiency 5/6/2017       Past Surgical History:   Procedure Laterality Date   • DEEP NECK LYMPH NODE BIOPSY / EXCISION      cervical node biopsy with dissection of jugular nodes   • EGD  01/2020    Dr Bette Echevarria, [facility]  Nissen fundoplication evidencing gastric fundus without abnormality, Long segment Brennan's esophagus without dysplasia, bx negative  • EGD  11/10/2021    4 cm Brennan's esophagus biopsy positive for intestinal metaplasia and no dysplasia, biopsies stomach negative for H  pylori, Nissen intact by Dr Bette Echevarria   • ESOPHAGOGASTRIC FUNDOPLASTY  03/2016    Nissen Fundoplication, Dr Bereket Alejo        Family History   Problem Relation Age of Onset   • Heart attack Father         Anteroapical MI   • Leukemia Brother      I have reviewed and agree with the history as documented  E-Cigarette/Vaping   • E-Cigarette Use Never User      E-Cigarette/Vaping Substances     Social History     Tobacco Use   • Smoking status: Never   • Smokeless tobacco: Never   Vaping Use   • Vaping Use: Never used   Substance Use Topics   • Alcohol use:  Yes     Alcohol/week: 1 0 - 2 0 standard drink of alcohol Types: 1 - 2 Standard drinks or equivalent per week     Comment: socially   • Drug use: Not Currently       Review of Systems   Constitutional: Negative for appetite change, fatigue and fever  HENT: Negative for rhinorrhea and sore throat  Eyes: Negative for pain  Respiratory: Negative for cough, shortness of breath and wheezing  Cardiovascular: Positive for chest pain  Negative for leg swelling  Gastrointestinal: Negative for abdominal pain, diarrhea and vomiting  Genitourinary: Negative for dysuria and flank pain  Musculoskeletal: Negative for back pain and neck pain  Skin: Negative for rash  Neurological: Negative for syncope and headaches  Psychiatric/Behavioral:        Mood normal       Physical Exam  Physical Exam  Vitals and nursing note reviewed  Constitutional:       Appearance: He is well-developed  HENT:      Head: Normocephalic and atraumatic  Right Ear: External ear normal       Left Ear: External ear normal    Eyes:      General: No scleral icterus  Extraocular Movements: Extraocular movements intact  Cardiovascular:      Rate and Rhythm: Normal rate and regular rhythm  Pulmonary:      Effort: Pulmonary effort is normal  No respiratory distress  Breath sounds: Normal breath sounds  Abdominal:      Palpations: Abdomen is soft  Tenderness: There is no abdominal tenderness  Musculoskeletal:         General: No deformity or signs of injury  Normal range of motion  Cervical back: Normal range of motion and neck supple  Skin:     General: Skin is warm and dry  Coloration: Skin is not jaundiced or pale  Neurological:      General: No focal deficit present  Mental Status: He is alert and oriented to person, place, and time     Psychiatric:         Mood and Affect: Mood normal          Behavior: Behavior normal          Vital Signs  ED Triage Vitals [06/24/23 1606]   Temperature Pulse Respirations Blood Pressure SpO2   97 9 °F (36 6 °C) 93 16 170/98 100 %      Temp Source Heart Rate Source Patient Position - Orthostatic VS BP Location FiO2 (%)   Oral Monitor Sitting Right arm --      Pain Score       3           Vitals:    06/24/23 1606   BP: 170/98   Pulse: 93   Patient Position - Orthostatic VS: Sitting         Visual Acuity      ED Medications  Medications   ketorolac (TORADOL) injection 30 mg (30 mg Intravenous Given 6/24/23 1628)       Diagnostic Studies  Results Reviewed     Procedure Component Value Units Date/Time    HS Troponin 0hr (reflex protocol) [[id number]]  (Normal) Collected: 06/24/23 1627    Lab Status: Final result Specimen: Blood from Arm, Left Updated: 06/24/23 1657     hs TnI 0hr 3 ng/L     Comprehensive metabolic panel [[id number]] Collected: 06/24/23 1627    Lab Status: Final result Specimen: Blood from Arm, Left Updated: 06/24/23 1654     Sodium 142 mmol/L      Potassium 3 7 mmol/L      Chloride 105 mmol/L      CO2 30 mmol/L      ANION GAP 7 mmol/L      BUN 16 mg/dL      Creatinine 1 03 mg/dL      Glucose 129 mg/dL      Calcium 8 9 mg/dL      AST 21 U/L      ALT 30 U/L      Alkaline Phosphatase 71 U/L      Total Protein 6 4 g/dL      Albumin 3 8 g/dL      Total Bilirubin 0 33 mg/dL      eGFR 83 ml/min/1 73sq m     Narrative:      [place] guidelines for Chronic Kidney Disease (CKD):   •  Stage 1 with normal or high GFR (GFR > 90 mL/min/1 73 square meters)  •  Stage 2 Mild CKD (GFR = 60-89 mL/min/1 73 square meters)  •  Stage 3A Moderate CKD (GFR = 45-59 mL/min/1 73 square meters)  •  Stage 3B Moderate CKD (GFR = 30-44 mL/min/1 73 square meters)  •  Stage 4 Severe CKD (GFR = 15-29 mL/min/1 73 square meters)  •  Stage 5 End Stage CKD (GFR <15 mL/min/1 73 square meters)  Note: GFR calculation is accurate only with a steady state creatinine    D-Dimer [[id number]]  (Normal) Collected: 06/24/23 1627    Lab Status: Final result Specimen: Blood from Arm, Left Updated: 06/24/23 1648     D-Dimer, Quant 0 46 ug/ml FEU     Narrative: In the evaluation for possible pulmonary embolism, in the appropriate (Well's Score of 4 or less) patient, the age adjusted d-dimer cutoff for this patient can be calculated as:    Age x 0 01 (in ug/mL) for Age-adjusted D-dimer exclusion threshold for a patient over 50 years  Protime-INR [[id number]]  (Normal) Collected: 06/24/23 1627    Lab Status: Final result Specimen: Blood from Arm, Left Updated: 06/24/23 1646     Protime 12 8 seconds      INR 0 96    APTT [[id number]]  (Normal) Collected: 06/24/23 1627    Lab Status: Final result Specimen: Blood from Arm, Left Updated: 06/24/23 1646     PTT 25 seconds     CBC and differential [[id number]]  (Abnormal) Collected: 06/24/23 1627    Lab Status: Final result Specimen: Blood from Arm, Left Updated: 06/24/23 1633     WBC 7 84 Thousand/uL      RBC 4 20 Million/uL      Hemoglobin 13 3 g/dL      Hematocrit 40 4 %      MCV 96 fL      MCH 31 7 pg      MCHC 32 9 g/dL      RDW 12 2 %      MPV 8 7 fL      Platelets 549 Thousands/uL      nRBC 0 /100 WBCs      Neutrophils Relative 59 %      Immat GRANS % 0 %      Lymphocytes Relative 29 %      Monocytes Relative 8 %      Eosinophils Relative 3 %      Basophils Relative 1 %      Neutrophils Absolute 4 58 Thousands/µL      Immature Grans Absolute 0 01 Thousand/uL      Lymphocytes Absolute 2 30 Thousands/µL      Monocytes Absolute 0 64 Thousand/µL      Eosinophils Absolute 0 24 Thousand/µL      Basophils Absolute 0 07 Thousands/µL                  XR chest 1 view portable    (Results Pending)              Procedures  Procedures         ED Course                                             Medical Decision Making  ekg no st elevation or depression    CXR nad    Pt  [place] better in ER    I went over his labs , CXR and ekg with him  He is stable for outpt  Follow up with cardiology for outpt   Stress test       Nonspecific chest pain: acute illness or injury  Amount and/or Complexity of Data Reviewed  Labs: ordered  Radiology: ordered  Risk  Prescription drug management  Disposition  Final diagnoses:   Nonspecific chest pain     Time reflects when diagnosis was documented in both MDM as applicable and the Disposition within this note     Time User Action Codes Description Comment    6/24/2023  5:10 PM Chucho Barnard Add [R07 9] Nonspecific chest pain       ED Disposition     ED Disposition   Discharge    Condition   Stable    Date/Time   Sat Jun 24, 2023  5:10 PM    Comment   Joshua Perez discharge to home/self care                 Follow-up Information     Follow up With Specialties Details Why Contact Info Additional [address] , DO Family Medicine   [address]  [address] [place] [id number]  [facility]   [facility] [id number]  [address] [place] [address] Cardiology [facility], [address] [place] [address], [place], [place], [address] [phone]          Discharge Medication List as of 6/24/2023  5:11 PM      CONTINUE these medications which have NOT CHANGED    Details   Alum Hydroxide-Mag Carbonate (Gaviscon Extra Strength) [id number] MG/10ML SUSP Take 5 mL by mouth 2 (two) times a day, Starting Fri 3/10/2023, Normal      atorvastatin (LIPITOR) 20 mg tablet TAKE 1 TABLET BY MOUTH EVERY DAY, Normal      clindamycin (CLINDAGEL) 1 % gel APPLY TO AFFECTED AREA TWICE A DAY TO FACE, Historical Med      colchicine (COLCRYS) 0 6 mg tablet Take 1 tablet 3 times daily as needed for gout pain, Normal      ergocalciferol (VITAMIN D2) 50,000 units TAKE 1 CAPSULE BY MOUTH ONE TIME PER WEEK, Normal      esomeprazole (NexIUM) 40 MG capsule Take 1 capsule (40 mg total) by mouth 2 (two) times a day before meals, Starting Fri 3/10/2023, Until Sat 4/29/2023, Normal      ibuprofen (MOTRIN) 600 mg tablet Take 1 tablet (600 mg total) by mouth every 6 (six) hours as needed for mild pain for up to 10 days, Starting Sat 4/29/2023, Until Tue 5/9/2023 at 2359, Normal      valACYclovir (VALTREX) 1,000 mg tablet TAKE 1 TABLET BY MOUTH TWICE A DAY, Normal      Vitamin D, Cholecalciferol, 50 MCG (2000 UT) CAPS Take by mouth, Historical Med             No discharge procedures on file      PDMP Review     None          ED Provider  Electronically Signed by           Rachel Bob MD  06/24/23 0517

## 2023-06-25 LAB
ATRIAL RATE: 86 BPM
P AXIS: 63 DEGREES
PR INTERVAL: 142 MS
QRS AXIS: 34 DEGREES
QRSD INTERVAL: 92 MS
QT INTERVAL: 370 MS
QTC INTERVAL: 442 MS
T WAVE AXIS: 9 DEGREES
VENTRICULAR RATE: 86 BPM

## 2023-06-25 PROCEDURE — 93010 ELECTROCARDIOGRAM REPORT: CPT | Performed by: INTERNAL MEDICINE

## 2023-07-26 ENCOUNTER — TELEPHONE (OUTPATIENT)
Dept: FAMILY MEDICINE CLINIC | Facility: CLINIC | Age: 53
End: 2023-07-26

## 2023-07-26 DIAGNOSIS — L70.0 ACNE VULGARIS: Primary | ICD-10-CM

## 2023-07-26 RX ORDER — DOXYCYCLINE HYCLATE 100 MG/1
CAPSULE ORAL
Qty: 28 CAPSULE | Refills: 0 | Status: SHIPPED | OUTPATIENT
Start: 2023-07-26 | End: 2023-08-26

## 2023-07-26 NOTE — TELEPHONE ENCOUNTER
Prescribed doxycycline 1 a day for 4 weeks. After that patient should see a dermatologist for his acne. If he does not see 1 I would place an order for [address].   Patient should not be in the sun with this medication and will call a significant sunburn

## 2023-07-26 NOTE — TELEPHONE ENCOUNTER
Patient LMOM for a refill on his antibiotic  for acne.   Last filled 4-10-23  Off current med list (Doxy)

## 2023-10-05 ENCOUNTER — TRANSCRIBE ORDERS (OUTPATIENT)
Dept: LAB | Facility: CLINIC | Age: 53
End: 2023-10-05

## 2023-10-05 ENCOUNTER — APPOINTMENT (OUTPATIENT)
Dept: LAB | Facility: CLINIC | Age: 53
End: 2023-10-05
Payer: COMMERCIAL

## 2023-10-05 DIAGNOSIS — D72.829 LEUKOCYTOSIS, UNSPECIFIED TYPE: ICD-10-CM

## 2023-10-05 DIAGNOSIS — M13.10 MONOARTHRITIS: Primary | ICD-10-CM

## 2023-10-05 DIAGNOSIS — E55.9 VITAMIN D DEFICIENCY: ICD-10-CM

## 2023-10-05 DIAGNOSIS — M13.10 MONOARTHRITIS: ICD-10-CM

## 2023-10-05 LAB
25(OH)D3 SERPL-MCNC: 27.1 NG/ML (ref 30–100)
ALBUMIN SERPL BCP-MCNC: 3.9 G/DL (ref 3.5–5)
ALP SERPL-CCNC: 85 U/L (ref 34–104)
ALT SERPL W P-5'-P-CCNC: 51 U/L (ref 7–52)
ANION GAP SERPL CALCULATED.3IONS-SCNC: 10 MMOL/L
AST SERPL W P-5'-P-CCNC: 37 U/L (ref 13–39)
BASOPHILS # BLD AUTO: 0.09 THOUSANDS/ÂΜL (ref 0–0.1)
BASOPHILS NFR BLD AUTO: 1 % (ref 0–1)
BILIRUB SERPL-MCNC: 0.46 MG/DL (ref 0.2–1)
BUN SERPL-MCNC: 11 MG/DL (ref 5–25)
CALCIUM SERPL-MCNC: 9.2 MG/DL (ref 8.4–10.2)
CHLORIDE SERPL-SCNC: 104 MMOL/L (ref 96–108)
CO2 SERPL-SCNC: 26 MMOL/L (ref 21–32)
CREAT SERPL-MCNC: 0.76 MG/DL (ref 0.6–1.3)
CRP SERPL QL: 8.7 MG/L
EOSINOPHIL # BLD AUTO: 0.35 THOUSAND/ÂΜL (ref 0–0.61)
EOSINOPHIL NFR BLD AUTO: 5 % (ref 0–6)
ERYTHROCYTE [DISTWIDTH] IN BLOOD BY AUTOMATED COUNT: 11.9 % (ref 11.6–15.1)
ERYTHROCYTE [SEDIMENTATION RATE] IN BLOOD: 15 MM/HOUR (ref 0–19)
GFR SERPL CREATININE-BSD FRML MDRD: 104 ML/MIN/1.73SQ M
GLUCOSE P FAST SERPL-MCNC: 98 MG/DL (ref 65–99)
HCT VFR BLD AUTO: 45 % (ref 36.5–49.3)
HGB BLD-MCNC: 15.1 G/DL (ref 12–17)
IMM GRANULOCYTES # BLD AUTO: 0.02 THOUSAND/UL (ref 0–0.2)
IMM GRANULOCYTES NFR BLD AUTO: 0 % (ref 0–2)
LYMPHOCYTES # BLD AUTO: 1.79 THOUSANDS/ÂΜL (ref 0.6–4.47)
LYMPHOCYTES NFR BLD AUTO: 27 % (ref 14–44)
MCH RBC QN AUTO: 31.9 PG (ref 26.8–34.3)
MCHC RBC AUTO-ENTMCNC: 33.6 G/DL (ref 31.4–37.4)
MCV RBC AUTO: 95 FL (ref 82–98)
MONOCYTES # BLD AUTO: 0.7 THOUSAND/ÂΜL (ref 0.17–1.22)
MONOCYTES NFR BLD AUTO: 11 % (ref 4–12)
NEUTROPHILS # BLD AUTO: 3.69 THOUSANDS/ÂΜL (ref 1.85–7.62)
NEUTS SEG NFR BLD AUTO: 56 % (ref 43–75)
NRBC BLD AUTO-RTO: 0 /100 WBCS
PLATELET # BLD AUTO: 445 THOUSANDS/UL (ref 149–390)
PMV BLD AUTO: 9.9 FL (ref 8.9–12.7)
POTASSIUM SERPL-SCNC: 4.1 MMOL/L (ref 3.5–5.3)
PROT SERPL-MCNC: 6.9 G/DL (ref 6.4–8.4)
RBC # BLD AUTO: 4.73 MILLION/UL (ref 3.88–5.62)
SODIUM SERPL-SCNC: 140 MMOL/L (ref 135–147)
WBC # BLD AUTO: 6.64 THOUSAND/UL (ref 4.31–10.16)

## 2023-10-05 PROCEDURE — 85025 COMPLETE CBC W/AUTO DIFF WBC: CPT

## 2023-10-05 PROCEDURE — 86140 C-REACTIVE PROTEIN: CPT

## 2023-10-05 PROCEDURE — 36415 COLL VENOUS BLD VENIPUNCTURE: CPT

## 2023-10-05 PROCEDURE — 80053 COMPREHEN METABOLIC PANEL: CPT

## 2023-10-05 PROCEDURE — 82306 VITAMIN D 25 HYDROXY: CPT

## 2023-10-05 PROCEDURE — 85652 RBC SED RATE AUTOMATED: CPT

## 2023-10-10 ENCOUNTER — OFFICE VISIT (OUTPATIENT)
Dept: FAMILY MEDICINE CLINIC | Facility: CLINIC | Age: 53
End: 2023-10-10
Payer: COMMERCIAL

## 2023-10-10 VITALS
OXYGEN SATURATION: 98 % | HEART RATE: 100 BPM | WEIGHT: 217 LBS | HEIGHT: 71 IN | RESPIRATION RATE: 20 BRPM | SYSTOLIC BLOOD PRESSURE: 128 MMHG | BODY MASS INDEX: 30.38 KG/M2 | DIASTOLIC BLOOD PRESSURE: 78 MMHG

## 2023-10-10 DIAGNOSIS — D75.839 THROMBOCYTOSIS: ICD-10-CM

## 2023-10-10 DIAGNOSIS — M10.9 GOUTY ARTHRITIS: ICD-10-CM

## 2023-10-10 DIAGNOSIS — E78.2 MIXED HYPERLIPIDEMIA: Primary | ICD-10-CM

## 2023-10-10 DIAGNOSIS — Z23 NEEDS FLU SHOT: ICD-10-CM

## 2023-10-10 DIAGNOSIS — K22.70 BARRETT'S ESOPHAGUS WITHOUT DYSPLASIA: ICD-10-CM

## 2023-10-10 DIAGNOSIS — Z12.11 SCREENING FOR COLON CANCER: ICD-10-CM

## 2023-10-10 DIAGNOSIS — Z12.5 SCREENING FOR PROSTATE CANCER: ICD-10-CM

## 2023-10-10 DIAGNOSIS — E66.9 OBESITY (BMI 30-39.9): ICD-10-CM

## 2023-10-10 DIAGNOSIS — E55.9 VITAMIN D DEFICIENCY: ICD-10-CM

## 2023-10-10 DIAGNOSIS — Z00.00 HEALTH CARE MAINTENANCE: ICD-10-CM

## 2023-10-10 DIAGNOSIS — R59.1 LYMPHADENOPATHY: ICD-10-CM

## 2023-10-10 DIAGNOSIS — K21.9 GASTROESOPHAGEAL REFLUX DISEASE WITHOUT ESOPHAGITIS: ICD-10-CM

## 2023-10-10 DIAGNOSIS — M13.80 SERONEGATIVE INFLAMMATORY ARTHRITIS: ICD-10-CM

## 2023-10-10 PROBLEM — M19.90 INFLAMMATORY ARTHRITIS: Status: ACTIVE | Noted: 2023-10-10

## 2023-10-10 PROBLEM — E66.3 OVERWEIGHT (BMI 25.0-29.9): Status: RESOLVED | Noted: 2023-04-18 | Resolved: 2023-10-10

## 2023-10-10 PROCEDURE — 90471 IMMUNIZATION ADMIN: CPT | Performed by: FAMILY MEDICINE

## 2023-10-10 PROCEDURE — 90686 IIV4 VACC NO PRSV 0.5 ML IM: CPT | Performed by: FAMILY MEDICINE

## 2023-10-10 PROCEDURE — 99214 OFFICE O/P EST MOD 30 MIN: CPT | Performed by: FAMILY MEDICINE

## 2023-10-10 RX ORDER — ATORVASTATIN CALCIUM 20 MG/1
20 TABLET, FILM COATED ORAL DAILY
Qty: 90 TABLET | Refills: 1 | Status: SHIPPED | OUTPATIENT
Start: 2023-10-10

## 2023-10-10 NOTE — PATIENT INSTRUCTIONS
Complete lipid panel this week  Continue present therapy  Follow-up with all specialist per their instructions  Diet exercise weight loss recommended  Return in 6 months for office visit and blood work sooner if needed

## 2023-10-10 NOTE — PROGRESS NOTES
Name: Vania Khan      : 1970      MRN: [id number]  Encounter Provider: Leida Delarosa DO  Encounter Date: 10/10/2023   Encounter department: [address]   #1. Hyperlipidemia, lipid panel ordered, atorvastatin reordered  2. Vitamin D deficiency much improved 18 up to 27.1 off his supplementation we will monitor  3. Thrombocytosis, very mild I believe secondary to his inflammatory arthritis  4. Seronegative inflammatory arthritis following with rheumatology  6. Gouty arthritis, uric acid is ordered  7. GERD, resolved status post Carlos fundoplication off medication  8. Brennan's esophagus follows with GI, Dr. Husam Anaya  9. Screening prostate cancer PSA ordered  10. Lymphadenopathy, resolved  11. BMI 30.27 diet exercise weight loss recommended  12. Healthcare maintenance influenza vaccine given  13. Screening colon cancer up-to-date with colonoscopy  14. Return in 6 months for office visit and blood work sooner if needed    1. Mixed hyperlipidemia  Assessment & Plan:  Panel is ordered, atorvastatin reordered    Orders:  -     atorvastatin (LIPITOR) 20 mg tablet; Take 1 tablet (20 mg total) by mouth daily  -     Lipid Panel with Direct LDL reflex; Future; Expected date: 10/10/2023  -     CBC; Future; Expected date: 04/10/2024  -     Comprehensive metabolic panel; Future; Expected date: 04/10/2024  -     Lipid Panel with Direct LDL reflex; Future; Expected date: 04/10/2024  -     TSH, 3rd generation with Free T4 reflex; Future; Expected date: 04/10/2024  -     UA (URINE) with reflex to Scope; Future; Expected date: 04/10/2024  -     PSA, Total Screen; Future; Expected date: 04/10/2024  -     Uric acid; Future; Expected date: 04/10/2024  -     Vitamin D 25 hydroxy; Future; Expected date: 04/10/2024    2.  Needs flu shot  -     influenza vaccine, quadrivalent, 0.5 mL, preservative-free, for adult and pediatric patients 6 mos+ (AFLURIA, FLUARIX, FLULAVAL, FLUZONE)    3. Vitamin D deficiency  Assessment & Plan:  Vitamin D is risen from 18.7-27 patient has stopped his supplementation we will continue to monitor    Orders:  -     CBC; Future; Expected date: 04/10/2024  -     Comprehensive metabolic panel; Future; Expected date: 04/10/2024  -     Lipid Panel with Direct LDL reflex; Future; Expected date: 04/10/2024  -     TSH, 3rd generation with Free T4 reflex; Future; Expected date: 04/10/2024  -     UA (URINE) with reflex to Scope; Future; Expected date: 04/10/2024  -     PSA, Total Screen; Future; Expected date: 04/10/2024  -     Uric acid; Future; Expected date: 04/10/2024  -     Vitamin D 25 hydroxy; Future; Expected date: 04/10/2024    4. Thrombocytosis  Assessment & Plan:  Mild continue to monitor believe this is secondary to his inflammatory arthritis    Orders:  -     CBC; Future; Expected date: 04/10/2024  -     Comprehensive metabolic panel; Future; Expected date: 04/10/2024  -     Lipid Panel with Direct LDL reflex; Future; Expected date: 04/10/2024  -     TSH, 3rd generation with Free T4 reflex; Future; Expected date: 04/10/2024  -     UA (URINE) with reflex to Scope; Future; Expected date: 04/10/2024  -     PSA, Total Screen; Future; Expected date: 04/10/2024  -     Uric acid; Future; Expected date: 04/10/2024  -     Vitamin D 25 hydroxy; Future; Expected date: 04/10/2024    5. Seronegative inflammatory arthritis  Assessment & Plan:  Follows with rheumatology    Orders:  -     CBC; Future; Expected date: 04/10/2024  -     Comprehensive metabolic panel; Future; Expected date: 04/10/2024  -     Lipid Panel with Direct LDL reflex; Future; Expected date: 04/10/2024  -     TSH, 3rd generation with Free T4 reflex; Future; Expected date: 04/10/2024  -     UA (URINE) with reflex to Scope; Future; Expected date: 04/10/2024  -     PSA, Total Screen; Future; Expected date: 04/10/2024  -     Uric acid;  Future; Expected date: 04/10/2024  -     Vitamin D 25 hydroxy; Future; Expected date: 04/10/2024    6. Gouty arthritis  Assessment & Plan:  Blood work ordered    Orders:  -     CBC; Future; Expected date: 04/10/2024  -     Comprehensive metabolic panel; Future; Expected date: 04/10/2024  -     Lipid Panel with Direct LDL reflex; Future; Expected date: 04/10/2024  -     TSH, 3rd generation with Free T4 reflex; Future; Expected date: 04/10/2024  -     UA (URINE) with reflex to Scope; Future; Expected date: 04/10/2024  -     PSA, Total Screen; Future; Expected date: 04/10/2024  -     Uric acid; Future; Expected date: 04/10/2024  -     Vitamin D 25 hydroxy; Future; Expected date: 04/10/2024    7. Gastroesophageal reflux disease without esophagitis  Assessment & Plan:  Patient is off his Nexium without symptoms status post Carlos fundoplication    Orders:  -     CBC; Future; Expected date: 04/10/2024  -     Comprehensive metabolic panel; Future; Expected date: 04/10/2024  -     Lipid Panel with Direct LDL reflex; Future; Expected date: 04/10/2024  -     TSH, 3rd generation with Free T4 reflex; Future; Expected date: 04/10/2024  -     UA (URINE) with reflex to Scope; Future; Expected date: 04/10/2024  -     PSA, Total Screen; Future; Expected date: 04/10/2024  -     Uric acid; Future; Expected date: 04/10/2024  -     Vitamin D 25 hydroxy; Future; Expected date: 04/10/2024    8. Brennan's esophagus without dysplasia  Assessment & Plan:  Status post Carlos fundoplication follows with GI    Orders:  -     CBC; Future; Expected date: 04/10/2024  -     Comprehensive metabolic panel; Future; Expected date: 04/10/2024  -     Lipid Panel with Direct LDL reflex; Future; Expected date: 04/10/2024  -     TSH, 3rd generation with Free T4 reflex; Future; Expected date: 04/10/2024  -     UA (URINE) with reflex to Scope; Future; Expected date: 04/10/2024  -     PSA, Total Screen; Future; Expected date: 04/10/2024  -     Uric acid;  Future; Expected date: 04/10/2024  -     Vitamin D 25 hydroxy; Future; Expected date: 04/10/2024    9. Screening for prostate cancer  Assessment & Plan:  PSA is ordered    Orders:  -     CBC; Future; Expected date: 04/10/2024  -     Comprehensive metabolic panel; Future; Expected date: 04/10/2024  -     Lipid Panel with Direct LDL reflex; Future; Expected date: 04/10/2024  -     TSH, 3rd generation with Free T4 reflex; Future; Expected date: 04/10/2024  -     UA (URINE) with reflex to Scope; Future; Expected date: 04/10/2024  -     PSA, Total Screen; Future; Expected date: 04/10/2024  -     Uric acid; Future; Expected date: 04/10/2024  -     Vitamin D 25 hydroxy; Future; Expected date: 04/10/2024    10. Obesity (BMI 30-39. 9)  Assessment & Plan:  BMI 30.27 diet exercise weight loss recommended    Orders:  -     CBC; Future; Expected date: 04/10/2024  -     Comprehensive metabolic panel; Future; Expected date: 04/10/2024  -     Lipid Panel with Direct LDL reflex; Future; Expected date: 04/10/2024  -     TSH, 3rd generation with Free T4 reflex; Future; Expected date: 04/10/2024  -     UA (URINE) with reflex to Scope; Future; Expected date: 04/10/2024  -     PSA, Total Screen; Future; Expected date: 04/10/2024  -     Uric acid; Future; Expected date: 04/10/2024  -     Vitamin D 25 hydroxy; Future; Expected date: 04/10/2024    11. Health care maintenance  Assessment & Plan:  Influenza vaccine given    Orders:  -     CBC; Future; Expected date: 04/10/2024  -     Comprehensive metabolic panel; Future; Expected date: 04/10/2024  -     Lipid Panel with Direct LDL reflex; Future; Expected date: 04/10/2024  -     TSH, 3rd generation with Free T4 reflex; Future; Expected date: 04/10/2024  -     UA (URINE) with reflex to Scope; Future; Expected date: 04/10/2024  -     PSA, Total Screen; Future; Expected date: 04/10/2024  -     Uric acid; Future; Expected date: 04/10/2024  -     Vitamin D 25 hydroxy; Future; Expected date: 04/10/2024    12.  Lymphadenopathy  Assessment & Plan:  Resolved    Orders:  -     CBC; Future; Expected date: 04/10/2024  -     Comprehensive metabolic panel; Future; Expected date: 04/10/2024  -     Lipid Panel with Direct LDL reflex; Future; Expected date: 04/10/2024  -     TSH, 3rd generation with Free T4 reflex; Future; Expected date: 04/10/2024  -     UA (URINE) with reflex to Scope; Future; Expected date: 04/10/2024  -     PSA, Total Screen; Future; Expected date: 04/10/2024  -     Uric acid; Future; Expected date: 04/10/2024  -     Vitamin D 25 hydroxy; Future; Expected date: 04/10/2024    13. Screening for colon cancer  Assessment & Plan:  Up-to-date with colonoscopy will need in 2028          Depression Screening and Follow-up Plan: Patient was screened for depression during today's encounter. They screened negative with a PHQ-2 score of 0. Subjective      Patient doing well. Is following with rheumatology for seronegative arthritis. Recent blood work was discussed patient is not had a lipid panel will order that. Review of Systems   Constitutional: Negative. HENT: Negative. Eyes: Negative. Respiratory: Negative. Cardiovascular: Negative. Gastrointestinal: Negative. Endocrine: Negative. Genitourinary: Negative. Musculoskeletal:        HPI   Skin: Negative. Allergic/Immunologic: Negative. Neurological: Negative. Hematological: Negative. Psychiatric/Behavioral: Negative.         Current Outpatient Medications on File Prior to Visit   Medication Sig   • [DISCONTINUED] atorvastatin (LIPITOR) 20 mg tablet TAKE 1 TABLET BY MOUTH EVERY DAY   • [DISCONTINUED] ergocalciferol (VITAMIN D2) 50,000 units TAKE 1 CAPSULE BY MOUTH ONE TIME PER WEEK (Patient not taking: Reported on 10/10/2023)   • [DISCONTINUED] esomeprazole (NexIUM) 40 MG capsule Take 1 capsule (40 mg total) by mouth 2 (two) times a day before meals   • [DISCONTINUED] ibuprofen (MOTRIN) 600 mg tablet Take 1 tablet (600 mg total) by mouth every 6 (six) hours as needed for mild pain for up to 10 days   • [DISCONTINUED] Vitamin D, Cholecalciferol, 50 MCG (2000 UT) CAPS Take by mouth (Patient not taking: Reported on 4/29/2023)       Objective     /78   Pulse 100   Resp 20   Ht 5' 11" (1.803 m)   Wt 98.4 kg (217 lb)   SpO2 98%   BMI 30.27 kg/m²     Physical Exam  Vitals and nursing note reviewed. Constitutional:       Appearance: Normal appearance. HENT:      Head: Normocephalic and atraumatic. Mouth/Throat:      Mouth: Mucous membranes are moist.   Eyes:      General: No scleral icterus. Cardiovascular:      Rate and Rhythm: Regular rhythm. Heart sounds: Normal heart sounds. Pulmonary:      Effort: Pulmonary effort is normal.      Breath sounds: Normal breath sounds. Abdominal:      General: Bowel sounds are normal.      Palpations: Abdomen is soft. Tenderness: There is no abdominal tenderness. Musculoskeletal:      Cervical back: Neck supple. Right lower leg: No edema. Left lower leg: No edema. Lymphadenopathy:      Cervical: No cervical adenopathy. Skin:     General: Skin is warm and dry. Neurological:      General: No focal deficit present. Mental Status: He is alert.    Psychiatric:         Mood and Affect: Mood normal.       James Escamilla DO

## 2023-11-10 ENCOUNTER — OFFICE VISIT (OUTPATIENT)
Dept: URGENT CARE | Facility: MEDICAL CENTER | Age: 53
End: 2023-11-10
Payer: COMMERCIAL

## 2023-11-10 ENCOUNTER — APPOINTMENT (OUTPATIENT)
Dept: LAB | Facility: CLINIC | Age: 53
End: 2023-11-10
Payer: COMMERCIAL

## 2023-11-10 VITALS
RESPIRATION RATE: 20 BRPM | OXYGEN SATURATION: 96 % | SYSTOLIC BLOOD PRESSURE: 147 MMHG | DIASTOLIC BLOOD PRESSURE: 92 MMHG | TEMPERATURE: 100.3 F | HEART RATE: 93 BPM

## 2023-11-10 DIAGNOSIS — J40 BRONCHITIS: ICD-10-CM

## 2023-11-10 DIAGNOSIS — E78.2 MIXED HYPERLIPIDEMIA: ICD-10-CM

## 2023-11-10 DIAGNOSIS — Z20.822 ENCOUNTER FOR LABORATORY TESTING FOR COVID-19 VIRUS: ICD-10-CM

## 2023-11-10 DIAGNOSIS — J01.00 ACUTE MAXILLARY SINUSITIS, RECURRENCE NOT SPECIFIED: Primary | ICD-10-CM

## 2023-11-10 LAB
CHOLEST SERPL-MCNC: 208 MG/DL
HDLC SERPL-MCNC: 54 MG/DL
LDLC SERPL CALC-MCNC: 115 MG/DL (ref 0–100)
SARS-COV-2 AG UPPER RESP QL IA: NEGATIVE
TRIGL SERPL-MCNC: 195 MG/DL
VALID CONTROL: NORMAL

## 2023-11-10 PROCEDURE — 87811 SARS-COV-2 COVID19 W/OPTIC: CPT | Performed by: PHYSICIAN ASSISTANT

## 2023-11-10 PROCEDURE — 99213 OFFICE O/P EST LOW 20 MIN: CPT | Performed by: PHYSICIAN ASSISTANT

## 2023-11-10 PROCEDURE — 80061 LIPID PANEL: CPT

## 2023-11-10 PROCEDURE — 36415 COLL VENOUS BLD VENIPUNCTURE: CPT

## 2023-11-10 RX ORDER — FLUTICASONE PROPIONATE 50 MCG
1 SPRAY, SUSPENSION (ML) NASAL DAILY
Qty: 9.9 ML | Refills: 0 | Status: SHIPPED | OUTPATIENT
Start: 2023-11-10

## 2023-11-10 RX ORDER — BENZONATATE 100 MG/1
100 CAPSULE ORAL 3 TIMES DAILY PRN
Qty: 20 CAPSULE | Refills: 0 | Status: SHIPPED | OUTPATIENT
Start: 2023-11-10

## 2023-11-10 RX ORDER — CIPROFLOXACIN 500 MG/1
500 TABLET, FILM COATED ORAL EVERY 12 HOURS SCHEDULED
Qty: 14 TABLET | Refills: 0 | Status: SHIPPED | OUTPATIENT
Start: 2023-11-10 | End: 2023-11-17

## 2023-11-10 NOTE — PROGRESS NOTES
[facility]        NAME: Emery Gil is a 48 y.o. male  : 1970    MRN: [id number]  DATE: November 10, 2023  TIME: 8:28 AM    /92   Pulse 93   Temp 100.3 °F (37.9 °C) (Tympanic)   Resp 20   SpO2 96%     Assessment and Plan   No primary diagnosis found. No diagnosis found. Patient Instructions       Follow up with PCP in 3-5 days. Proceed to  ER if symptoms worsen. Chief Complaint     Chief Complaint   Patient presents with    Cough     Pt c/o cold and chest cold for the past week with malaise and "wheezing" for the past month. History of Present Illness       Pt with 1 week hx of productive cough sinus congestion     Cough        Review of Systems   Review of Systems   Constitutional: Negative. HENT:  Positive for congestion, sinus pressure and sinus pain. Eyes: Negative. Respiratory:  Positive for cough. Cardiovascular: Negative. Gastrointestinal: Negative. Endocrine: Negative. Genitourinary: Negative. Musculoskeletal: Negative. Skin: Negative. Allergic/Immunologic: Negative. Neurological: Negative. Hematological: Negative. Psychiatric/Behavioral: Negative. All other systems reviewed and are negative. Current Medications       Current Outpatient Medications:     atorvastatin (LIPITOR) 20 mg tablet, Take 1 tablet (20 mg total) by mouth daily, Disp: 90 tablet, Rfl: 1    Current Allergies     Allergies as of 11/10/2023 - Reviewed 11/10/2023   Allergen Reaction Noted    Azithromycin GI Intolerance 2014    Penicillins  2008            The following portions of the patient's history were reviewed and updated as appropriate: allergies, current medications, past family history, past medical history, past social history, past surgical history and problem list.     Past Medical History:   Diagnosis Date    Anxiety     Arthritis     Brennan esophagus 2013    Diagnosed  with EGD biopsy, Dr Jamil Mcdonald Biopsies confirmed again in 2014 in 2015. Dr. Willaim Goodpasture, EGD 12/12/2017 Positive intestinal metaplasia without dysplasia. EGD 2020 long segment Brennan's esophagus, biopsy negative. COVID-19     COVID-19 virus infection 12/16/2020    15Dec: poitive test.16Dec:  Day 4. Some issues with SOB and cough. 18Dec: Day 6: Feeling better overall. Increased cough. 21Dec: Day 9: First symptoms 11Dec. Continues with minor symptoms. 22Dec: Day 10. Has imporved a bit. Add Phenergan at HS. 12/23/2020. Released from isolation see note 12/28: Still reporting lingering symptoms of COVID. GERD (gastroesophageal reflux disease) 08/30/2012    Description: s/p Carlos fundoplication, Dr. Carola Mancilla    Herpes simplex type 1 infection 06/18/2014    Hyperlipidemia 08/30/2012    Lymphadenopathy 08/30/2012     Benign Cervical per Dr. Rey Diver    Vitamin D deficiency 05/06/2017       Past Surgical History:   Procedure Laterality Date    DEEP NECK LYMPH NODE BIOPSY / EXCISION      cervical node biopsy with dissection of jugular nodes    EGD  01/2020    Dr Willaim Goodpasture, [facility]. Nissen fundoplication evidencing gastric fundus without abnormality, Long segment Brennan's esophagus without dysplasia, bx negative. EGD  11/10/2021    4 cm Brennan's esophagus biopsy positive for intestinal metaplasia and no dysplasia, biopsies stomach negative for H. pylori, Nissen intact by Dr. China Park  03/2016    Nissen Fundoplication, Dr Carola Mancilla        Family History   Problem Relation Age of Onset    Heart attack Father         Anteroapical MI    Leukemia Brother          Medications have been verified. Objective   /92   Pulse 93   Temp 100.3 °F (37.9 °C) (Tympanic)   Resp 20   SpO2 96%        Physical Exam     Physical Exam  Vitals and nursing note reviewed. Constitutional:       Appearance: Normal appearance. He is normal weight. HENT:      Head: Normocephalic and atraumatic.       Right Ear: Tympanic membrane, ear canal and external ear normal.      Left Ear: Tympanic membrane, ear canal and external ear normal.      Nose: Congestion and rhinorrhea present. Comments: Boggy mucosa   max sinus tender to palp      Mouth/Throat:      Mouth: Mucous membranes are moist.      Pharynx: Oropharynx is clear. Eyes:      Conjunctiva/sclera: Conjunctivae normal.      Pupils: Pupils are equal, round, and reactive to light. Cardiovascular:      Rate and Rhythm: Normal rate and regular rhythm. Pulses: Normal pulses. Heart sounds: Normal heart sounds. Pulmonary:      Effort: Pulmonary effort is normal.      Comments: Minor coarse sounds   Abdominal:      General: Abdomen is flat. Bowel sounds are normal.      Palpations: Abdomen is soft. Musculoskeletal:         General: Normal range of motion. Cervical back: Normal range of motion and neck supple. Skin:     General: Skin is warm. Capillary Refill: Capillary refill takes less than 2 seconds. Neurological:      General: No focal deficit present. Mental Status: He is alert and oriented to person, place, and time.    Psychiatric:         Mood and Affect: Mood normal. soft.   Musculoskeletal:         General: Normal range of motion. Cervical back: Normal range of motion and neck supple. Skin:     General: Skin is warm. Capillary Refill: Capillary refill takes less than 2 seconds. Neurological:      General: No focal deficit present. Mental Status: He is alert and oriented to person, place, and time.    Psychiatric:         Mood and Affect: Mood normal.

## 2023-11-13 ENCOUNTER — OFFICE VISIT (OUTPATIENT)
Dept: FAMILY MEDICINE CLINIC | Facility: CLINIC | Age: 53
End: 2023-11-13
Payer: COMMERCIAL

## 2023-11-13 VITALS
TEMPERATURE: 97.1 F | OXYGEN SATURATION: 97 % | HEIGHT: 71 IN | BODY MASS INDEX: 29.68 KG/M2 | SYSTOLIC BLOOD PRESSURE: 114 MMHG | WEIGHT: 212 LBS | DIASTOLIC BLOOD PRESSURE: 72 MMHG | HEART RATE: 95 BPM

## 2023-11-13 DIAGNOSIS — K21.9 GASTROESOPHAGEAL REFLUX DISEASE WITHOUT ESOPHAGITIS: ICD-10-CM

## 2023-11-13 DIAGNOSIS — J45.21 MILD INTERMITTENT REACTIVE AIRWAY DISEASE WITH ACUTE EXACERBATION: Primary | ICD-10-CM

## 2023-11-13 DIAGNOSIS — J01.00 ACUTE NON-RECURRENT MAXILLARY SINUSITIS: ICD-10-CM

## 2023-11-13 PROCEDURE — 99214 OFFICE O/P EST MOD 30 MIN: CPT | Performed by: PHYSICIAN ASSISTANT

## 2023-11-13 RX ORDER — ALBUTEROL SULFATE 90 UG/1
2 AEROSOL, METERED RESPIRATORY (INHALATION) EVERY 6 HOURS PRN
Qty: 18 G | Refills: 5 | Status: SHIPPED | OUTPATIENT
Start: 2023-11-13

## 2023-11-13 RX ORDER — COLCHICINE 0.6 MG/1
1 TABLET ORAL DAILY
COMMUNITY

## 2023-11-13 RX ORDER — PREDNISONE 10 MG/1
TABLET ORAL
Qty: 30 TABLET | Refills: 0 | Status: SHIPPED | OUTPATIENT
Start: 2023-11-13 | End: 2023-11-23

## 2023-11-13 NOTE — PROGRESS NOTES
Name: Yobany Lai      : 1970      MRN: [id number]  Encounter Provider: Jase Benoit PA-C  Encounter Date: 2023   Encounter department: [address] [place] [address] PRIMARY CARE    Assessment & Plan     Patient Instructions   Assessment/plan:  1.  URI/sinusitis-patient previously treated in urgent care setting with Cipro and seems to be doing better with many of these symptoms. 2.  Reactive airway disease-patient does seem to be having exacerbation likely secondary to recent infection and silent reflux. Would recommend prednisone 10 mg, 5 tablets daily for 2 days, then 4 tablets daily for 2 days, then 3 tablets daily for 2 days, then 2 tablets daily for 2 days, then 1 tablet daily for 2 days. Thirty pills with no refills. We will also give a prescription for Ventolin HFA inhaler to be used 2 puffs every 4-6 hours as needed for episodes of cough, wheezing, or chest tightness. 3.  GERD-not at goal.  Patient does seem to be having some evidence of silent reflux. He does follow with gastroenterology and otolaryngology and has appointment later this week. He has had previous Nissen fundoplication surgery. 1. Mild intermittent reactive airway disease with acute exacerbation  -     predniSONE 10 mg tablet; 5,5,4,4,3,3,2,2,1,1  -     albuterol (Ventolin HFA) 90 mcg/act inhaler; Inhale 2 puffs every 6 (six) hours as needed for wheezing    2. Acute non-recurrent maxillary sinusitis    3. Gastroesophageal reflux disease without esophagitis  -     albuterol (Ventolin HFA) 90 mcg/act inhaler; Inhale 2 puffs every 6 (six) hours as needed for wheezing        Depression Screening and Follow-up Plan: Patient was screened for depression during today's encounter. They screened negative with a PHQ-2 score of 0. Subjective      HPI: This is a 59-year-old gentleman that presents to the office with a history of silent reflux.   He states that he does follow with gastroenterology and has appointment later this week with ear nose and throat specialist.  This has been a problem much of his life and he has had a history of Nissen fundoplication surgery. He is more concerned with about a week ago when he started with some symptoms of infection including fever and increased congestion. He started having episodes during the nighttime where he was coughing so severely that he was having trouble catching his breath. He continues with significant coughing episodes at night and feels that he is actually concerned that he cannot breathe well enough when he is having an episode. He feels pretty well during the daytime and feels that some of his cold symptoms are improving. He is currently on antibiotic therapy as prescribed at the urgent care setting about 4 days ago with Cipro twice daily. Review of Systems   Constitutional:  Negative for fever. HENT:  Positive for congestion. Respiratory:  Positive for cough, chest tightness and shortness of breath. Cardiovascular:  Negative for chest pain. Musculoskeletal:  Negative for arthralgias.        Current Outpatient Medications on File Prior to Visit   Medication Sig   • atorvastatin (LIPITOR) 20 mg tablet Take 1 tablet (20 mg total) by mouth daily   • ciprofloxacin (CIPRO) 500 mg tablet Take 1 tablet (500 mg total) by mouth every 12 (twelve) hours for 7 days   • colchicine (COLCRYS) 0.6 mg tablet Take 1 tablet by mouth daily   • fluticasone (FLONASE) 50 mcg/act nasal spray 1 spray into each nostril daily   • benzonatate (TESSALON PERLES) 100 mg capsule Take 1 capsule (100 mg total) by mouth 3 (three) times a day as needed for cough (Patient not taking: Reported on 11/13/2023)       Objective     /72 (BP Location: Left arm, Patient Position: Sitting, Cuff Size: Large)   Pulse 95   Temp (!) 97.1 °F (36.2 °C) (Tympanic)   Ht 5' 11" (1.803 m)   Wt 96.2 kg (212 lb)   SpO2 97%   BMI 29.57 kg/m²     Physical Exam  Constitutional:       Appearance: He is well-developed. HENT:      Head: Normocephalic. Neck:      Comments: Enlarged tonsillar adenopathy. Cardiovascular:      Rate and Rhythm: Normal rate and regular rhythm. Pulmonary:      Effort: Pulmonary effort is normal. No respiratory distress. Breath sounds: Normal breath sounds. Abdominal:      Palpations: Abdomen is soft. Musculoskeletal:         General: Normal range of motion. Neurological:      Mental Status: He is alert and oriented to person, place, and time.        Otto Monk PA-C

## 2023-11-13 NOTE — PATIENT INSTRUCTIONS
Assessment/plan:  1.  URI/sinusitis-patient previously treated in urgent care setting with Cipro and seems to be doing better with many of these symptoms. 2.  Reactive airway disease-patient does seem to be having exacerbation likely secondary to recent infection and silent reflux. Would recommend prednisone 10 mg, 5 tablets daily for 2 days, then 4 tablets daily for 2 days, then 3 tablets daily for 2 days, then 2 tablets daily for 2 days, then 1 tablet daily for 2 days. Thirty pills with no refills. We will also give a prescription for Ventolin HFA inhaler to be used 2 puffs every 4-6 hours as needed for episodes of cough, wheezing, or chest tightness. 3.  GERD-not at goal.  Patient does seem to be having some evidence of silent reflux. He does follow with gastroenterology and otolaryngology and has appointment later this week. He has had previous Nissen fundoplication surgery.

## 2023-12-07 PROBLEM — Z98.890 HX OF COLONOSCOPY: Status: ACTIVE | Noted: 2021-11-10

## 2023-12-09 PROBLEM — Z12.11 SCREENING FOR COLON CANCER: Status: RESOLVED | Noted: 2021-12-08 | Resolved: 2023-12-09

## 2023-12-09 PROBLEM — Z12.5 SCREENING FOR PROSTATE CANCER: Status: RESOLVED | Noted: 2021-09-29 | Resolved: 2023-12-09

## 2024-01-16 ENCOUNTER — TELEPHONE (OUTPATIENT)
Age: 54
End: 2024-01-16

## 2024-01-25 PROCEDURE — 88305 TISSUE EXAM BY PATHOLOGIST: CPT | Performed by: PATHOLOGY

## 2024-01-26 ENCOUNTER — LAB REQUISITION (OUTPATIENT)
Dept: LAB | Facility: HOSPITAL | Age: 54
End: 2024-01-26
Payer: COMMERCIAL

## 2024-01-26 DIAGNOSIS — K22.70 BARRETT'S ESOPHAGUS WITHOUT DYSPLASIA: ICD-10-CM

## 2024-01-30 PROCEDURE — 88305 TISSUE EXAM BY PATHOLOGIST: CPT | Performed by: PATHOLOGY

## 2024-02-21 PROBLEM — Z00.00 HEALTH CARE MAINTENANCE: Status: RESOLVED | Noted: 2018-12-17 | Resolved: 2024-02-21

## 2024-03-04 ENCOUNTER — TELEPHONE (OUTPATIENT)
Age: 54
End: 2024-03-04

## 2024-03-07 ENCOUNTER — OFFICE VISIT (OUTPATIENT)
Dept: FAMILY MEDICINE CLINIC | Facility: CLINIC | Age: 54
End: 2024-03-07
Payer: COMMERCIAL

## 2024-03-07 VITALS
SYSTOLIC BLOOD PRESSURE: 132 MMHG | OXYGEN SATURATION: 97 % | DIASTOLIC BLOOD PRESSURE: 80 MMHG | RESPIRATION RATE: 18 BRPM | HEART RATE: 99 BPM | HEIGHT: 71 IN | WEIGHT: 224 LBS | BODY MASS INDEX: 31.36 KG/M2

## 2024-03-07 DIAGNOSIS — Z82.49 FAMILY HISTORY OF EARLY CAD: ICD-10-CM

## 2024-03-07 DIAGNOSIS — K22.70 BARRETT'S ESOPHAGUS WITHOUT DYSPLASIA: ICD-10-CM

## 2024-03-07 DIAGNOSIS — D75.839 THROMBOCYTOSIS: ICD-10-CM

## 2024-03-07 DIAGNOSIS — R07.9 CHEST PAIN, UNSPECIFIED TYPE: Primary | ICD-10-CM

## 2024-03-07 DIAGNOSIS — E55.9 VITAMIN D DEFICIENCY: ICD-10-CM

## 2024-03-07 DIAGNOSIS — E78.2 MIXED HYPERLIPIDEMIA: ICD-10-CM

## 2024-03-07 DIAGNOSIS — Z12.5 SCREENING FOR PROSTATE CANCER: ICD-10-CM

## 2024-03-07 DIAGNOSIS — M10.9 GOUTY ARTHRITIS: ICD-10-CM

## 2024-03-07 DIAGNOSIS — M13.80 SERONEGATIVE INFLAMMATORY ARTHRITIS: ICD-10-CM

## 2024-03-07 DIAGNOSIS — E66.9 OBESITY (BMI 30-39.9): ICD-10-CM

## 2024-03-07 PROCEDURE — 93000 ELECTROCARDIOGRAM COMPLETE: CPT | Performed by: FAMILY MEDICINE

## 2024-03-07 PROCEDURE — 99214 OFFICE O/P EST MOD 30 MIN: CPT | Performed by: FAMILY MEDICINE

## 2024-03-07 NOTE — PATIENT INSTRUCTIONS
Complete chest x-ray  Complete stress echocardiogram  Complete fasting blood work  Return in 4 weeks  If symptoms worsen report to emergency department

## 2024-03-07 NOTE — PROGRESS NOTES
Name: Jose Miguel Clayton      : 1970      MRN: [id number]  Encounter Provider: James Escamilla DO  Encounter Date: 3/7/2024   Encounter department: [facility] PRIMARY CARE    Assessment & Plan     1.  Chest pain  2.  Cardiac risk factors of obesity, male, family history in father and hypercholesterolemia  EKG was without change  Chest x-ray  Stress echocardiogram ordered  3.  Hypercholesterolemia patient currently on atorvastatin 20 mg daily.  In light of over 3 risk factors LDL goal is less than 100 blood work ordered  4.  Brennan's esophagus, stable follows with GI  5.  Gouty arthritis blood work ordered, asymptomatic  6.  Point thrombocytosis blood work ordered  7.  Screening prostate cancer, PSA ordered #8.  Vitamin D deficiency blood work ordered  9.  Seronegative arthritis follows with rheumatology #10.  Return in 4 weeks, if symptoms worsen report to ER      1. Chest pain, unspecified type  -     CBC  -     Comprehensive metabolic panel  -     Lipid Panel with Direct LDL reflex  -     TSH, 3rd generation with Free T4 reflex  -     UA (URINE) with reflex to Scope; Future  -     Vitamin D 25 hydroxy; Future  -     Uric acid  -     PSA, Total Screen; Future  -     Echo stress test, exercise; Future; Expected date: 2024  -     XR chest pa & lateral; Future; Expected date: 2024  -     POCT ECG    2. Obesity (BMI 30-39.9)  Assessment & Plan:  Cardiac risk factor, diet exercise weight loss recommended    Orders:  -     CBC  -     Comprehensive metabolic panel  -     Lipid Panel with Direct LDL reflex  -     TSH, 3rd generation with Free T4 reflex  -     UA (URINE) with reflex to Scope; Future  -     Vitamin D 25 hydroxy; Future  -     Uric acid  -     PSA, Total Screen; Future    3. Family history of early CAD  -     CBC  -     Comprehensive metabolic panel  -     Lipid Panel with Direct LDL reflex  -     TSH, 3rd generation with Free T4 reflex  -     UA (URINE) with reflex to  Scope; Future  -     Vitamin D 25 hydroxy; Future  -     Uric acid  -     PSA, Total Screen; Future  -     Echo stress test, exercise; Future; Expected date: 03/07/2024    4. Gouty arthritis  Assessment & Plan:  Blood work ordered    Orders:  -     CBC  -     Comprehensive metabolic panel  -     Lipid Panel with Direct LDL reflex  -     TSH, 3rd generation with Free T4 reflex  -     UA (URINE) with reflex to Scope; Future  -     Vitamin D 25 hydroxy; Future  -     Uric acid  -     PSA, Total Screen; Future    5. Seronegative inflammatory arthritis  Assessment & Plan:  Patient follows with rheumatology    Orders:  -     CBC  -     Comprehensive metabolic panel  -     Lipid Panel with Direct LDL reflex  -     TSH, 3rd generation with Free T4 reflex  -     UA (URINE) with reflex to Scope; Future  -     Vitamin D 25 hydroxy; Future  -     Uric acid  -     PSA, Total Screen; Future    6. Thrombocytosis  Assessment & Plan:  Blood work ordered    Orders:  -     CBC  -     Comprehensive metabolic panel  -     Lipid Panel with Direct LDL reflex  -     TSH, 3rd generation with Free T4 reflex  -     UA (URINE) with reflex to Scope; Future  -     Vitamin D 25 hydroxy; Future  -     Uric acid  -     PSA, Total Screen; Future    7. Mixed hyperlipidemia  Assessment & Plan:  Is greater than 3 risk factor for CAD LDL goal be less than 100 at the present time patient is on atorvastatin 20 mg daily, blood work is ordered    Orders:  -     CBC  -     Comprehensive metabolic panel  -     Lipid Panel with Direct LDL reflex  -     TSH, 3rd generation with Free T4 reflex  -     UA (URINE) with reflex to Scope; Future  -     Vitamin D 25 hydroxy; Future  -     Uric acid  -     PSA, Total Screen; Future    8. Brennan's esophagus without dysplasia  Assessment & Plan:  Asymptomatic, patient follow with gastroenterology status post Carlos fundoplication    Orders:  -     CBC  -     Comprehensive metabolic panel  -     Lipid Panel with Direct  LDL reflex  -     TSH, 3rd generation with Free T4 reflex  -     UA (URINE) with reflex to Scope; Future  -     Vitamin D 25 hydroxy; Future  -     Uric acid  -     PSA, Total Screen; Future    9. Screening for prostate cancer  Assessment & Plan:  PSA ordered    Orders:  -     CBC  -     Comprehensive metabolic panel  -     Lipid Panel with Direct LDL reflex  -     TSH, 3rd generation with Free T4 reflex  -     UA (URINE) with reflex to Scope; Future  -     Vitamin D 25 hydroxy; Future  -     Uric acid  -     PSA, Total Screen; Future    10. Vitamin D deficiency      Depression Screening and Follow-up Plan: Patient was screened for depression during today's encounter. They screened negative with a PHQ-2 score of 0.        Subjective      Patient's had chest pain off-and-on for the past 6 months this seems to be more left-sided anterior this comes and goes with or without activity lasting a few minutes.  Denies nausea vomiting palpitations diaphoresis or shortness of breath.  Cardiac risk factors, male, obese, hyperlipidemia, positive family history      Review of Systems   Constitutional: Negative.    HENT: Negative.     Eyes: Negative.    Respiratory:  Negative for shortness of breath.    Cardiovascular:         HPI   Gastrointestinal:  Negative for nausea.   Endocrine: Negative.    Genitourinary: Negative.    Musculoskeletal: Negative.    Skin:         HPI   Allergic/Immunologic: Negative.    Neurological: Negative.    Hematological: Negative.    Psychiatric/Behavioral: Negative.         Current Outpatient Medications on File Prior to Visit   Medication Sig   • atorvastatin (LIPITOR) 20 mg tablet Take 1 tablet (20 mg total) by mouth daily   • [DISCONTINUED] albuterol (Ventolin HFA) 90 mcg/act inhaler Inhale 2 puffs every 6 (six) hours as needed for wheezing (Patient not taking: Reported on 12/7/2023)   • [DISCONTINUED] benzonatate (TESSALON PERLES) 100 mg capsule Take 1 capsule (100 mg total) by mouth 3 (three)  "times a day as needed for cough (Patient not taking: Reported on 11/13/2023)   • [DISCONTINUED] colchicine (COLCRYS) 0.6 mg tablet Take 1 tablet by mouth daily (Patient not taking: Reported on 12/7/2023)   • [DISCONTINUED] fluticasone (FLONASE) 50 mcg/act nasal spray 1 spray into each nostril daily (Patient not taking: Reported on 12/7/2023)       Objective     /80 (BP Location: Right arm, Patient Position: Sitting, Cuff Size: Large)   Pulse 99   Resp 18   Ht 5' 11\" (1.803 m)   Wt 102 kg (224 lb)   SpO2 97%   BMI 31.24 kg/m²     Physical Exam  Vitals and nursing note reviewed.   Constitutional:       Appearance: Normal appearance. He is obese.   HENT:      Head: Normocephalic and atraumatic.      Mouth/Throat:      Mouth: Mucous membranes are moist.   Eyes:      General: No scleral icterus.  Neck:      Vascular: No carotid bruit.   Cardiovascular:      Rate and Rhythm: Normal rate and regular rhythm.      Heart sounds: Normal heart sounds.   Pulmonary:      Effort: Pulmonary effort is normal.      Breath sounds: Normal breath sounds.   Abdominal:      General: Bowel sounds are normal.      Palpations: Abdomen is soft.      Tenderness: There is no abdominal tenderness.   Musculoskeletal:      Cervical back: Neck supple.      Right lower leg: No edema.      Left lower leg: No edema.   Skin:     General: Skin is warm and dry.   Neurological:      General: No focal deficit present.      Mental Status: He is alert.   Psychiatric:         Mood and Affect: Mood normal.       James Escamilla, DO    "

## 2024-03-07 NOTE — ASSESSMENT & PLAN NOTE
Is greater than 3 risk factor for CAD LDL goal be less than 100 at the present time patient is on atorvastatin 20 mg daily, blood work is ordered

## 2024-04-04 ENCOUNTER — APPOINTMENT (OUTPATIENT)
Dept: LAB | Facility: CLINIC | Age: 54
End: 2024-04-04
Payer: COMMERCIAL

## 2024-04-04 ENCOUNTER — APPOINTMENT (OUTPATIENT)
Dept: RADIOLOGY | Facility: MEDICAL CENTER | Age: 54
End: 2024-04-04
Payer: COMMERCIAL

## 2024-04-04 DIAGNOSIS — R07.9 CHEST PAIN, UNSPECIFIED TYPE: ICD-10-CM

## 2024-04-04 DIAGNOSIS — M13.80 SERONEGATIVE INFLAMMATORY ARTHRITIS: ICD-10-CM

## 2024-04-04 DIAGNOSIS — Z82.49 FAMILY HISTORY OF EARLY CAD: ICD-10-CM

## 2024-04-04 DIAGNOSIS — E78.2 MIXED HYPERLIPIDEMIA: ICD-10-CM

## 2024-04-04 DIAGNOSIS — Z12.5 SCREENING FOR PROSTATE CANCER: ICD-10-CM

## 2024-04-04 DIAGNOSIS — K22.70 BARRETT'S ESOPHAGUS WITHOUT DYSPLASIA: ICD-10-CM

## 2024-04-04 DIAGNOSIS — E66.9 OBESITY (BMI 30-39.9): ICD-10-CM

## 2024-04-04 DIAGNOSIS — M10.9 GOUTY ARTHRITIS: ICD-10-CM

## 2024-04-04 DIAGNOSIS — D75.839 THROMBOCYTOSIS: ICD-10-CM

## 2024-04-04 LAB
25(OH)D3 SERPL-MCNC: 22 NG/ML (ref 30–100)
ALBUMIN SERPL BCP-MCNC: 4 G/DL (ref 3.5–5)
ALP SERPL-CCNC: 74 U/L (ref 34–104)
ALT SERPL W P-5'-P-CCNC: 35 U/L (ref 7–52)
ANION GAP SERPL CALCULATED.3IONS-SCNC: 5 MMOL/L (ref 4–13)
AST SERPL W P-5'-P-CCNC: 24 U/L (ref 13–39)
BILIRUB SERPL-MCNC: 0.51 MG/DL (ref 0.2–1)
BILIRUB UR QL STRIP: NEGATIVE
BUN SERPL-MCNC: 15 MG/DL (ref 5–25)
CALCIUM SERPL-MCNC: 9.2 MG/DL (ref 8.4–10.2)
CHLORIDE SERPL-SCNC: 106 MMOL/L (ref 96–108)
CHOLEST SERPL-MCNC: 164 MG/DL
CLARITY UR: CLEAR
CO2 SERPL-SCNC: 27 MMOL/L (ref 21–32)
COLOR UR: NORMAL
CREAT SERPL-MCNC: 0.84 MG/DL (ref 0.6–1.3)
ERYTHROCYTE [DISTWIDTH] IN BLOOD BY AUTOMATED COUNT: 12 % (ref 11.6–15.1)
GFR SERPL CREATININE-BSD FRML MDRD: 99 ML/MIN/1.73SQ M
GLUCOSE P FAST SERPL-MCNC: 98 MG/DL (ref 65–99)
GLUCOSE UR STRIP-MCNC: NEGATIVE MG/DL
HCT VFR BLD AUTO: 45.1 % (ref 36.5–49.3)
HDLC SERPL-MCNC: 46 MG/DL
HGB BLD-MCNC: 15.1 G/DL (ref 12–17)
HGB UR QL STRIP.AUTO: NEGATIVE
KETONES UR STRIP-MCNC: NEGATIVE MG/DL
LDLC SERPL CALC-MCNC: 96 MG/DL (ref 0–100)
LEUKOCYTE ESTERASE UR QL STRIP: NEGATIVE
MCH RBC QN AUTO: 31.5 PG (ref 26.8–34.3)
MCHC RBC AUTO-ENTMCNC: 33.5 G/DL (ref 31.4–37.4)
MCV RBC AUTO: 94 FL (ref 82–98)
NITRITE UR QL STRIP: NEGATIVE
PH UR STRIP.AUTO: 6 [PH]
PLATELET # BLD AUTO: 359 THOUSANDS/UL (ref 149–390)
PMV BLD AUTO: 9.5 FL (ref 8.9–12.7)
POTASSIUM SERPL-SCNC: 4.3 MMOL/L (ref 3.5–5.3)
PROT SERPL-MCNC: 6.7 G/DL (ref 6.4–8.4)
PROT UR STRIP-MCNC: NEGATIVE MG/DL
PSA SERPL-MCNC: 1.6 NG/ML (ref 0–4)
RBC # BLD AUTO: 4.8 MILLION/UL (ref 3.88–5.62)
SODIUM SERPL-SCNC: 138 MMOL/L (ref 135–147)
SP GR UR STRIP.AUTO: 1.02 (ref 1–1.03)
TRIGL SERPL-MCNC: 112 MG/DL
TSH SERPL DL<=0.05 MIU/L-ACNC: 2.56 UIU/ML (ref 0.45–4.5)
URATE SERPL-MCNC: 6.6 MG/DL (ref 3.5–8.5)
UROBILINOGEN UR STRIP-ACNC: <2 MG/DL
WBC # BLD AUTO: 6.58 THOUSAND/UL (ref 4.31–10.16)

## 2024-04-04 PROCEDURE — 82306 VITAMIN D 25 HYDROXY: CPT

## 2024-04-04 PROCEDURE — 36415 COLL VENOUS BLD VENIPUNCTURE: CPT

## 2024-04-04 PROCEDURE — G0103 PSA SCREENING: HCPCS

## 2024-04-04 PROCEDURE — 71046 X-RAY EXAM CHEST 2 VIEWS: CPT

## 2024-04-04 PROCEDURE — 81003 URINALYSIS AUTO W/O SCOPE: CPT

## 2024-04-08 ENCOUNTER — HOSPITAL ENCOUNTER (OUTPATIENT)
Dept: NON INVASIVE DIAGNOSTICS | Facility: HOSPITAL | Age: 54
Discharge: HOME | End: 2024-04-08
Payer: COMMERCIAL

## 2024-04-08 VITALS
HEIGHT: 71 IN | BODY MASS INDEX: 31.36 KG/M2 | HEART RATE: 70 BPM | SYSTOLIC BLOOD PRESSURE: 132 MMHG | DIASTOLIC BLOOD PRESSURE: 80 MMHG | WEIGHT: 224 LBS

## 2024-04-08 DIAGNOSIS — R07.9 CHEST PAIN, UNSPECIFIED TYPE: ICD-10-CM

## 2024-04-08 DIAGNOSIS — Z82.49 FAMILY HISTORY OF EARLY CAD: ICD-10-CM

## 2024-04-08 LAB
AV PEAK GRADIENT: 4 MMHG
MAX HR PERCENT: 95 %
MAX HR: 160 BPM
RATE PRESSURE PRODUCT: NORMAL
SL CV LV EF: 60
SL CV STRESS RECOVERY BP: NORMAL MMHG
SL CV STRESS RECOVERY HR: 106 BPM
SL CV STRESS RECOVERY O2 SAT: 96 %
SL CV STRESS STAGE REACHED: 3
STRESS ANGINA INDEX: 0
STRESS BASELINE BP: NORMAL MMHG
STRESS BASELINE HR: 111 BPM
STRESS O2 SAT REST: 95 %
STRESS PEAK HR: 160 BPM
STRESS POST ESTIMATED WORKLOAD: 9.3 METS
STRESS POST EXERCISE DUR MIN: 7 MIN
STRESS POST EXERCISE DUR SEC: 30 SEC
STRESS POST O2 SAT PEAK: 97 %
STRESS POST PEAK BP: 140 MMHG

## 2024-04-08 PROCEDURE — 93350 STRESS TTE ONLY: CPT

## 2024-04-08 PROCEDURE — 93350 STRESS TTE ONLY: CPT | Performed by: STUDENT IN AN ORGANIZED HEALTH CARE EDUCATION/TRAINING PROGRAM

## 2024-04-09 LAB
CHEST PAIN STATEMENT: NORMAL
MAX DIASTOLIC BP: 78 MMHG
MAX PREDICTED HEART RATE: 167 BPM
PROTOCOL NAME: NORMAL
STRESS POST EXERCISE DUR MIN: 7 MIN
STRESS POST EXERCISE DUR SEC: 31 SEC
STRESS POST PEAK HR: 160 BPM
STRESS POST PEAK SYSTOLIC BP: 148 MMHG
TARGET HR FORMULA: NORMAL
TEST INDICATION: NORMAL

## 2024-04-11 ENCOUNTER — OFFICE VISIT (OUTPATIENT)
Dept: FAMILY MEDICINE CLINIC | Facility: CLINIC | Age: 54
End: 2024-04-11
Payer: COMMERCIAL

## 2024-04-11 VITALS
HEART RATE: 92 BPM | BODY MASS INDEX: 31.87 KG/M2 | SYSTOLIC BLOOD PRESSURE: 122 MMHG | TEMPERATURE: 96.8 F | DIASTOLIC BLOOD PRESSURE: 80 MMHG | OXYGEN SATURATION: 97 % | WEIGHT: 228.5 LBS

## 2024-04-11 DIAGNOSIS — M10.9 GOUTY ARTHRITIS: ICD-10-CM

## 2024-04-11 DIAGNOSIS — J30.9 ALLERGIC RHINITIS, UNSPECIFIED SEASONALITY, UNSPECIFIED TRIGGER: ICD-10-CM

## 2024-04-11 DIAGNOSIS — E78.2 MIXED HYPERLIPIDEMIA: ICD-10-CM

## 2024-04-11 DIAGNOSIS — D75.839 THROMBOCYTOSIS: ICD-10-CM

## 2024-04-11 DIAGNOSIS — Z12.5 SCREENING FOR PROSTATE CANCER: ICD-10-CM

## 2024-04-11 DIAGNOSIS — R07.9 CHEST PAIN, UNSPECIFIED TYPE: Primary | ICD-10-CM

## 2024-04-11 DIAGNOSIS — E55.9 VITAMIN D DEFICIENCY: ICD-10-CM

## 2024-04-11 DIAGNOSIS — K22.70 BARRETT'S ESOPHAGUS WITHOUT DYSPLASIA: ICD-10-CM

## 2024-04-11 DIAGNOSIS — M13.80 SERONEGATIVE INFLAMMATORY ARTHRITIS: ICD-10-CM

## 2024-04-11 DIAGNOSIS — E66.9 OBESITY (BMI 30-39.9): ICD-10-CM

## 2024-04-11 PROCEDURE — 99214 OFFICE O/P EST MOD 30 MIN: CPT | Performed by: FAMILY MEDICINE

## 2024-04-11 RX ORDER — SULFASALAZINE 500 MG/1
1000 TABLET, DELAYED RELEASE ORAL 2 TIMES DAILY
COMMUNITY

## 2024-04-11 RX ORDER — ATORVASTATIN CALCIUM 20 MG/1
20 TABLET, FILM COATED ORAL DAILY
Qty: 90 TABLET | Refills: 3 | Status: SHIPPED | OUTPATIENT
Start: 2024-04-11

## 2024-04-11 RX ORDER — CHOLECALCIFEROL (VITAMIN D3) 50 MCG
2000 TABLET ORAL DAILY
COMMUNITY

## 2024-04-11 NOTE — PROGRESS NOTES
Name: Jose Miguel Clayton      : 1970      MRN: [id number]  Encounter Provider: James Escamilla DO  Encounter Date: 2024   Encounter department: [facility]    Assessment & Plan     1.  Chest pain, no recurrence  Chest x-ray and stress echo normal  2.  Hyperlipidemia, stable on atorvastatin 20 mg daily refill given  3.  Vitamin D deficiency start vitamin D 20,000 units daily  4.  Screening prostate cancer PSA normal  5.  BMI 31.87 diet exercise weight loss recommended  6.  Seronegative inflammatory arthritis, stable follows with rheumatology currently on sulfasalazine  7.  Gouty arthritis uric acid is normal #8.  Brennan's esophagus status post fundoplication follows with GI  8.  Thrombocytosis normal at the present time continue to monitor  9.  Allergic rhinitis currently asymptomatic #10.  Return in 6 months for office visit and blood work sooner if needed        1. Chest pain, unspecified type  -     CBC; Future; Expected date: 10/11/2024  -     Comprehensive metabolic panel; Future; Expected date: 10/11/2024  -     Lipid Panel with Direct LDL reflex; Future; Expected date: 10/11/2024  -     TSH, 3rd generation with Free T4 reflex; Future; Expected date: 10/11/2024  -     UA (URINE) with reflex to Scope; Future; Expected date: 10/11/2024  -     Vitamin D 25 hydroxy; Future; Expected date: 10/11/2024  -     Uric acid; Future; Expected date: 10/11/2024    2. Mixed hyperlipidemia  Assessment & Plan:  Stable continue atorvastatin 20 mg daily      Orders:  -     atorvastatin (LIPITOR) 20 mg tablet; Take 1 tablet (20 mg total) by mouth daily  -     CBC; Future; Expected date: 10/11/2024  -     Comprehensive metabolic panel; Future; Expected date: 10/11/2024  -     Lipid Panel with Direct LDL reflex; Future; Expected date: 10/11/2024  -     TSH, 3rd generation with Free T4 reflex; Future; Expected date: 10/11/2024  -     UA (URINE) with reflex to Scope; Future; Expected date:  10/11/2024  -     Vitamin D 25 hydroxy; Future; Expected date: 10/11/2024  -     Uric acid; Future; Expected date: 10/11/2024    3. Vitamin D deficiency  Assessment & Plan:  Start vitamin D 2000 units daily    Orders:  -     CBC; Future; Expected date: 10/11/2024  -     Comprehensive metabolic panel; Future; Expected date: 10/11/2024  -     Lipid Panel with Direct LDL reflex; Future; Expected date: 10/11/2024  -     TSH, 3rd generation with Free T4 reflex; Future; Expected date: 10/11/2024  -     UA (URINE) with reflex to Scope; Future; Expected date: 10/11/2024  -     Vitamin D 25 hydroxy; Future; Expected date: 10/11/2024  -     Uric acid; Future; Expected date: 10/11/2024    4. Screening for prostate cancer  Assessment & Plan:  PSA was normal    Orders:  -     CBC; Future; Expected date: 10/11/2024  -     Comprehensive metabolic panel; Future; Expected date: 10/11/2024  -     Lipid Panel with Direct LDL reflex; Future; Expected date: 10/11/2024  -     TSH, 3rd generation with Free T4 reflex; Future; Expected date: 10/11/2024  -     UA (URINE) with reflex to Scope; Future; Expected date: 10/11/2024  -     Vitamin D 25 hydroxy; Future; Expected date: 10/11/2024  -     Uric acid; Future; Expected date: 10/11/2024    5. Obesity (BMI 30-39.9)  Assessment & Plan:  My 31.87 diet exercise weight loss recommended    Orders:  -     CBC; Future; Expected date: 10/11/2024  -     Comprehensive metabolic panel; Future; Expected date: 10/11/2024  -     Lipid Panel with Direct LDL reflex; Future; Expected date: 10/11/2024  -     TSH, 3rd generation with Free T4 reflex; Future; Expected date: 10/11/2024  -     UA (URINE) with reflex to Scope; Future; Expected date: 10/11/2024  -     Vitamin D 25 hydroxy; Future; Expected date: 10/11/2024  -     Uric acid; Future; Expected date: 10/11/2024    6. Gouty arthritis  Assessment & Plan:  Uric acid is normal    Orders:  -     CBC; Future; Expected date: 10/11/2024  -     Comprehensive  metabolic panel; Future; Expected date: 10/11/2024  -     Lipid Panel with Direct LDL reflex; Future; Expected date: 10/11/2024  -     TSH, 3rd generation with Free T4 reflex; Future; Expected date: 10/11/2024  -     UA (URINE) with reflex to Scope; Future; Expected date: 10/11/2024  -     Vitamin D 25 hydroxy; Future; Expected date: 10/11/2024  -     Uric acid; Future; Expected date: 10/11/2024    7. Seronegative inflammatory arthritis  Assessment & Plan:  Follows with rheumatology, currently on sulfasalazine    Orders:  -     CBC; Future; Expected date: 10/11/2024  -     Comprehensive metabolic panel; Future; Expected date: 10/11/2024  -     Lipid Panel with Direct LDL reflex; Future; Expected date: 10/11/2024  -     TSH, 3rd generation with Free T4 reflex; Future; Expected date: 10/11/2024  -     UA (URINE) with reflex to Scope; Future; Expected date: 10/11/2024  -     Vitamin D 25 hydroxy; Future; Expected date: 10/11/2024  -     Uric acid; Future; Expected date: 10/11/2024    8. Brennan's esophagus without dysplasia  Assessment & Plan:  Status post fundoplication follows with GI    Orders:  -     CBC; Future; Expected date: 10/11/2024  -     Comprehensive metabolic panel; Future; Expected date: 10/11/2024  -     Lipid Panel with Direct LDL reflex; Future; Expected date: 10/11/2024  -     TSH, 3rd generation with Free T4 reflex; Future; Expected date: 10/11/2024  -     UA (URINE) with reflex to Scope; Future; Expected date: 10/11/2024  -     Vitamin D 25 hydroxy; Future; Expected date: 10/11/2024  -     Uric acid; Future; Expected date: 10/11/2024    9. Allergic rhinitis, unspecified seasonality, unspecified trigger  Assessment & Plan:  Asymptomatic at the present time    Orders:  -     CBC; Future; Expected date: 10/11/2024  -     Comprehensive metabolic panel; Future; Expected date: 10/11/2024  -     Lipid Panel with Direct LDL reflex; Future; Expected date: 10/11/2024  -     TSH, 3rd generation with Free T4  reflex; Future; Expected date: 10/11/2024  -     UA (URINE) with reflex to Scope; Future; Expected date: 10/11/2024  -     Vitamin D 25 hydroxy; Future; Expected date: 10/11/2024  -     Uric acid; Future; Expected date: 10/11/2024    10. Thrombocytosis  Assessment & Plan:  Platelets are normal continue to monitor             Subjective      Patient doing well without [facility] complaints concerns at present time.  Patient did gain 4 pounds since last office visit.  Blood work was reviewed.  Chest x-ray was clear, stress echocardiogram was normal      Review of Systems   Constitutional: Negative.    HENT: Negative.     Eyes: Negative.    Respiratory: Negative.     Cardiovascular:         HPI   Gastrointestinal: Negative.    Endocrine: Negative.    Genitourinary: Negative.    Musculoskeletal: Negative.    Skin: Negative.    Allergic/Immunologic: Negative.    Neurological: Negative.    Hematological: Negative.    Psychiatric/Behavioral: Negative.         Current Outpatient Medications on File Prior to Visit   Medication Sig    Cholecalciferol (Vitamin D) 50 MCG (2000 UT) tablet Take 2,000 Units by mouth daily    sulfaSALAzine (AZULFIDINE-EN) 500 mg EC tablet Take 1,000 mg by mouth 2 (two) times a day    [DISCONTINUED] atorvastatin (LIPITOR) 20 mg tablet Take 1 tablet (20 mg total) by mouth daily       Objective     /80   Pulse 92   Temp (!) 96.8 °F (36 °C) (Temporal)   Wt 104 kg (228 lb 8 oz)   SpO2 97%   BMI 31.87 kg/m²     Physical Exam  Vitals and nursing note reviewed.   Constitutional:       Appearance: Normal appearance.   HENT:      Head: Normocephalic and atraumatic.      Right Ear: Tympanic membrane normal.      Left Ear: Tympanic membrane normal.      Nose: Nose normal.      Mouth/Throat:      Mouth: Mucous membranes are moist.      Pharynx: Oropharynx is clear. No oropharyngeal exudate or posterior oropharyngeal erythema.   Eyes:      General: No scleral icterus.  Neck:      Vascular: No carotid  bruit.   Cardiovascular:      Rate and Rhythm: Normal rate and regular rhythm.      Heart sounds: Normal heart sounds.   Pulmonary:      Effort: Pulmonary effort is normal.      Breath sounds: Normal breath sounds.   Musculoskeletal:      Cervical back: Neck supple.   Skin:     General: Skin is warm and dry.   Neurological:      General: No focal deficit present.      Mental Status: He is alert.   Psychiatric:         Mood and Affect: Mood normal.       James Escamilla DO

## 2024-05-01 PROBLEM — Z12.5 SCREENING FOR PROSTATE CANCER: Status: RESOLVED | Noted: 2021-09-29 | Resolved: 2024-05-01

## 2024-08-22 ENCOUNTER — TRANSCRIBE ORDERS (OUTPATIENT)
Dept: LAB | Facility: CLINIC | Age: 54
End: 2024-08-22

## 2024-08-22 ENCOUNTER — APPOINTMENT (OUTPATIENT)
Dept: LAB | Facility: CLINIC | Age: 54
End: 2024-08-22
Payer: COMMERCIAL

## 2024-08-22 DIAGNOSIS — Z79.899 ENCOUNTER FOR LONG-TERM (CURRENT) USE OF OTHER MEDICATIONS: ICD-10-CM

## 2024-08-22 DIAGNOSIS — Z79.899 ENCOUNTER FOR LONG-TERM (CURRENT) USE OF OTHER MEDICATIONS: Primary | ICD-10-CM

## 2024-08-22 DIAGNOSIS — M13.10 MONOARTHRITIS: ICD-10-CM

## 2024-08-22 LAB
ALBUMIN SERPL BCG-MCNC: 3.8 G/DL (ref 3.5–5)
ALP SERPL-CCNC: 76 U/L (ref 34–104)
ALT SERPL W P-5'-P-CCNC: 28 U/L (ref 7–52)
ANION GAP SERPL CALCULATED.3IONS-SCNC: 7 MMOL/L (ref 4–13)
AST SERPL W P-5'-P-CCNC: 21 U/L (ref 13–39)
BASOPHILS # BLD AUTO: 0.06 THOUSANDS/ÂΜL (ref 0–0.1)
BASOPHILS NFR BLD AUTO: 1 % (ref 0–1)
BILIRUB SERPL-MCNC: 0.54 MG/DL (ref 0.2–1)
BUN SERPL-MCNC: 13 MG/DL (ref 5–25)
CALCIUM SERPL-MCNC: 8.8 MG/DL (ref 8.4–10.2)
CHLORIDE SERPL-SCNC: 106 MMOL/L (ref 96–108)
CO2 SERPL-SCNC: 26 MMOL/L (ref 21–32)
CREAT SERPL-MCNC: 0.9 MG/DL (ref 0.6–1.3)
CRP SERPL QL: 9.4 MG/L
EOSINOPHIL # BLD AUTO: 0.27 THOUSAND/ÂΜL (ref 0–0.61)
EOSINOPHIL NFR BLD AUTO: 4 % (ref 0–6)
ERYTHROCYTE [DISTWIDTH] IN BLOOD BY AUTOMATED COUNT: 12.3 % (ref 11.6–15.1)
ERYTHROCYTE [SEDIMENTATION RATE] IN BLOOD: 5 MM/HOUR (ref 0–19)
GFR SERPL CREATININE-BSD FRML MDRD: 96 ML/MIN/1.73SQ M
GLUCOSE SERPL-MCNC: 134 MG/DL (ref 65–140)
HCT VFR BLD AUTO: 42 % (ref 36.5–49.3)
HGB BLD-MCNC: 13.7 G/DL (ref 12–17)
IMM GRANULOCYTES # BLD AUTO: 0.02 THOUSAND/UL (ref 0–0.2)
IMM GRANULOCYTES NFR BLD AUTO: 0 % (ref 0–2)
LYMPHOCYTES # BLD AUTO: 1.84 THOUSANDS/ÂΜL (ref 0.6–4.47)
LYMPHOCYTES NFR BLD AUTO: 26 % (ref 14–44)
MCH RBC QN AUTO: 31.4 PG (ref 26.8–34.3)
MCHC RBC AUTO-ENTMCNC: 32.6 G/DL (ref 31.4–37.4)
MCV RBC AUTO: 96 FL (ref 82–98)
MONOCYTES # BLD AUTO: 0.7 THOUSAND/ÂΜL (ref 0.17–1.22)
MONOCYTES NFR BLD AUTO: 10 % (ref 4–12)
NEUTROPHILS # BLD AUTO: 4.12 THOUSANDS/ÂΜL (ref 1.85–7.62)
NEUTS SEG NFR BLD AUTO: 59 % (ref 43–75)
NRBC BLD AUTO-RTO: 0 /100 WBCS
PLATELET # BLD AUTO: 364 THOUSANDS/UL (ref 149–390)
PMV BLD AUTO: 9.1 FL (ref 8.9–12.7)
POTASSIUM SERPL-SCNC: 3.9 MMOL/L (ref 3.5–5.3)
PROT SERPL-MCNC: 6.4 G/DL (ref 6.4–8.4)
RBC # BLD AUTO: 4.37 MILLION/UL (ref 3.88–5.62)
SODIUM SERPL-SCNC: 139 MMOL/L (ref 135–147)
WBC # BLD AUTO: 7.01 THOUSAND/UL (ref 4.31–10.16)

## 2024-08-22 PROCEDURE — 85652 RBC SED RATE AUTOMATED: CPT

## 2024-08-22 PROCEDURE — 85025 COMPLETE CBC W/AUTO DIFF WBC: CPT

## 2024-08-22 PROCEDURE — 36415 COLL VENOUS BLD VENIPUNCTURE: CPT

## 2024-08-22 PROCEDURE — 80053 COMPREHEN METABOLIC PANEL: CPT

## 2024-08-22 PROCEDURE — 86140 C-REACTIVE PROTEIN: CPT

## 2024-09-01 NOTE — ASSESSMENT & PLAN NOTE
Barium swallow ordered Lab Results   Component Value Date    HGBA1C 6.0 (H) 07/04/2024     Recent Labs     08/31/24  2212 09/01/24  0806 09/01/24  1057   POCGLU 102 99 112       Prior to admission on Farxiga.  Will be placed on sliding scale insulin during hospitalization

## 2024-10-01 ENCOUNTER — TELEPHONE (OUTPATIENT)
Age: 54
End: 2024-10-01

## 2024-10-01 NOTE — TELEPHONE ENCOUNTER
Patient called in to see if he had open labs. Advised that he did from April of 2024. Patient will get labs done 10/2 and call back to make a follow up appt.

## 2024-10-02 ENCOUNTER — APPOINTMENT (OUTPATIENT)
Dept: LAB | Facility: CLINIC | Age: 54
End: 2024-10-02
Payer: COMMERCIAL

## 2024-10-02 ENCOUNTER — OFFICE VISIT (OUTPATIENT)
Dept: FAMILY MEDICINE CLINIC | Facility: CLINIC | Age: 54
End: 2024-10-02
Payer: COMMERCIAL

## 2024-10-02 VITALS
WEIGHT: 214 LBS | SYSTOLIC BLOOD PRESSURE: 126 MMHG | BODY MASS INDEX: 29.96 KG/M2 | HEIGHT: 71 IN | TEMPERATURE: 99.1 F | OXYGEN SATURATION: 98 % | HEART RATE: 82 BPM | DIASTOLIC BLOOD PRESSURE: 82 MMHG

## 2024-10-02 DIAGNOSIS — M10.9 GOUTY ARTHRITIS: ICD-10-CM

## 2024-10-02 DIAGNOSIS — K22.70 BARRETT'S ESOPHAGUS WITHOUT DYSPLASIA: ICD-10-CM

## 2024-10-02 DIAGNOSIS — M13.80 SERONEGATIVE INFLAMMATORY ARTHRITIS: ICD-10-CM

## 2024-10-02 DIAGNOSIS — E66.9 OBESITY (BMI 30-39.9): ICD-10-CM

## 2024-10-02 DIAGNOSIS — E78.2 MIXED HYPERLIPIDEMIA: ICD-10-CM

## 2024-10-02 DIAGNOSIS — M54.40 BACK PAIN OF LUMBAR REGION WITH SCIATICA: Primary | ICD-10-CM

## 2024-10-02 DIAGNOSIS — M62.830 MUSCLE SPASM OF BACK: ICD-10-CM

## 2024-10-02 DIAGNOSIS — M35.2: ICD-10-CM

## 2024-10-02 DIAGNOSIS — Z12.5 SCREENING FOR PROSTATE CANCER: ICD-10-CM

## 2024-10-02 DIAGNOSIS — J30.9 ALLERGIC RHINITIS, UNSPECIFIED SEASONALITY, UNSPECIFIED TRIGGER: ICD-10-CM

## 2024-10-02 DIAGNOSIS — R07.9 CHEST PAIN, UNSPECIFIED TYPE: ICD-10-CM

## 2024-10-02 DIAGNOSIS — E55.9 VITAMIN D DEFICIENCY: ICD-10-CM

## 2024-10-02 LAB
25(OH)D3 SERPL-MCNC: 21.7 NG/ML (ref 30–100)
ALBUMIN SERPL BCG-MCNC: 4.1 G/DL (ref 3.5–5)
ALP SERPL-CCNC: 73 U/L (ref 34–104)
ALT SERPL W P-5'-P-CCNC: 28 U/L (ref 7–52)
ANION GAP SERPL CALCULATED.3IONS-SCNC: 8 MMOL/L (ref 4–13)
AST SERPL W P-5'-P-CCNC: 22 U/L (ref 13–39)
BILIRUB SERPL-MCNC: 0.54 MG/DL (ref 0.2–1)
BILIRUB UR QL STRIP: NEGATIVE
BUN SERPL-MCNC: 13 MG/DL (ref 5–25)
CALCIUM SERPL-MCNC: 9.1 MG/DL (ref 8.4–10.2)
CHLORIDE SERPL-SCNC: 105 MMOL/L (ref 96–108)
CHOLEST SERPL-MCNC: 187 MG/DL
CLARITY UR: CLEAR
CO2 SERPL-SCNC: 27 MMOL/L (ref 21–32)
COLOR UR: NORMAL
CREAT SERPL-MCNC: 0.88 MG/DL (ref 0.6–1.3)
ERYTHROCYTE [DISTWIDTH] IN BLOOD BY AUTOMATED COUNT: 12.2 % (ref 11.6–15.1)
GFR SERPL CREATININE-BSD FRML MDRD: 97 ML/MIN/1.73SQ M
GLUCOSE P FAST SERPL-MCNC: 89 MG/DL (ref 65–99)
GLUCOSE UR STRIP-MCNC: NEGATIVE MG/DL
HCT VFR BLD AUTO: 43.3 % (ref 36.5–49.3)
HDLC SERPL-MCNC: 60 MG/DL
HGB BLD-MCNC: 14.4 G/DL (ref 12–17)
HGB UR QL STRIP.AUTO: NEGATIVE
KETONES UR STRIP-MCNC: NEGATIVE MG/DL
LDLC SERPL CALC-MCNC: 99 MG/DL (ref 0–100)
LEUKOCYTE ESTERASE UR QL STRIP: NEGATIVE
MCH RBC QN AUTO: 31.6 PG (ref 26.8–34.3)
MCHC RBC AUTO-ENTMCNC: 33.3 G/DL (ref 31.4–37.4)
MCV RBC AUTO: 95 FL (ref 82–98)
NITRITE UR QL STRIP: NEGATIVE
PH UR STRIP.AUTO: 6.5 [PH]
PLATELET # BLD AUTO: 381 THOUSANDS/UL (ref 149–390)
PMV BLD AUTO: 9.2 FL (ref 8.9–12.7)
POTASSIUM SERPL-SCNC: 4.4 MMOL/L (ref 3.5–5.3)
PROT SERPL-MCNC: 6.6 G/DL (ref 6.4–8.4)
PROT UR STRIP-MCNC: NEGATIVE MG/DL
RBC # BLD AUTO: 4.55 MILLION/UL (ref 3.88–5.62)
SODIUM SERPL-SCNC: 140 MMOL/L (ref 135–147)
SP GR UR STRIP.AUTO: 1.02 (ref 1–1.03)
TRIGL SERPL-MCNC: 141 MG/DL
TSH SERPL DL<=0.05 MIU/L-ACNC: 1.79 UIU/ML (ref 0.45–4.5)
URATE SERPL-MCNC: 6.8 MG/DL (ref 3.5–8.5)
UROBILINOGEN UR STRIP-ACNC: <2 MG/DL
WBC # BLD AUTO: 7.3 THOUSAND/UL (ref 4.31–10.16)

## 2024-10-02 PROCEDURE — 81003 URINALYSIS AUTO W/O SCOPE: CPT

## 2024-10-02 PROCEDURE — 99214 OFFICE O/P EST MOD 30 MIN: CPT | Performed by: FAMILY MEDICINE

## 2024-10-02 PROCEDURE — 80061 LIPID PANEL: CPT

## 2024-10-02 PROCEDURE — 85027 COMPLETE CBC AUTOMATED: CPT

## 2024-10-02 PROCEDURE — 80053 COMPREHEN METABOLIC PANEL: CPT

## 2024-10-02 PROCEDURE — 36415 COLL VENOUS BLD VENIPUNCTURE: CPT

## 2024-10-02 PROCEDURE — 84443 ASSAY THYROID STIM HORMONE: CPT

## 2024-10-02 PROCEDURE — 82306 VITAMIN D 25 HYDROXY: CPT

## 2024-10-02 PROCEDURE — 84550 ASSAY OF BLOOD/URIC ACID: CPT

## 2024-10-02 RX ORDER — APREMILAST 30 MG/1
TABLET, FILM COATED ORAL
COMMUNITY
Start: 2024-08-21

## 2024-10-02 RX ORDER — PREDNISONE 20 MG/1
TABLET ORAL
Qty: 20 TABLET | Refills: 0 | Status: SHIPPED | OUTPATIENT
Start: 2024-10-02 | End: 2024-10-12

## 2024-10-02 NOTE — PROGRESS NOTES
Ambulatory Visit  Name: Jose Miguel Clayton      : 1970      MRN: [id number]  Encounter Provider: James Escamilla DO  Encounter Date: 10/2/2024   Encounter department: [facility]    1.  Lumbar pain with sciatica  Prednisone 20 g decreasing dose ordered  2.  Muscle spasm back  Tizanidine ordered drowsiness discussed  3.  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  Inflammatory arthritis  5.  For gouty arthritis  Patient seems to be tolerating Otezla patient to follow-up with his rheumatologist, Dr. Castro  Assessment & Plan  Back pain of lumbar region with sciatica    Orders:    predniSONE 20 mg tablet; Take 3 tablets daily for 3 days, 2 tablets daily for 3 days, 1 tablet daily for 4 days.  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  Take with food    Seronegative inflammatory arthritis  Patient is intolerant of Otezla to follow with rheumatology    Orders:    predniSONE 20 mg tablet; Take 3 tablets daily for 3 days, 2 tablets daily for 3 days, 1 tablet daily for 4 days.  Take with food    Gouty arthritis    Orders:    predniSONE 20 mg tablet; Take 3 tablets daily for 3 days, 2 tablets daily for 3 days, 1 tablet daily for 4 days.  Take with food      Depression Screening and Follow-up Plan: Patient was screened for depression during today's encounter. They screened negative with a PHQ-2 score of 0.      History of Present Illness     Patient is having right lumbar pain to right hip to right posterior  "leg and calf.  Patient was diagnosed with Bassette syndrome and treated with prednisone in the past by Dr. Castro patient was placed on Otezla for erosive arthritis but seems to be intolerant secondary GI issues.  Has upcoming appointment with rheumatology.  Patient did take 60 mg of prednisone yesterday and feels somewhat better today with no history of trauma leg weakness or paresthesias          Review of Systems   Constitutional: Negative.    HENT: Negative.     Eyes: Negative.    Respiratory: Negative.     Cardiovascular: Negative.    Gastrointestinal: Negative.    Endocrine: Negative.    Genitourinary: Negative.    Musculoskeletal:         HPI   Skin: Negative.    Allergic/Immunologic: Negative.    Neurological:         PI   Hematological: Negative.    Psychiatric/Behavioral: Negative.             Objective     /82   Pulse 82   Temp 99.1 °F (37.3 °C) (Tympanic)   Ht 5' 11\" (1.803 m)   Wt 97.1 kg (214 lb)   SpO2 98%   BMI 29.85 kg/m²     Physical Exam  Vitals and nursing note reviewed.   Constitutional:       Appearance: Normal appearance.   HENT:      Head: Normocephalic and atraumatic.      Mouth/Throat:      Mouth: Mucous membranes are moist.   Eyes:      General: No scleral icterus.  Neck:      Vascular: No carotid bruit.   Cardiovascular:      Rate and Rhythm: Normal rate and regular rhythm.      Heart sounds: Normal heart sounds.   Pulmonary:      Effort: Pulmonary effort is normal.      Breath sounds: Normal breath sounds.   Musculoskeletal:         General: Tenderness present.      Cervical back: Neck supple.      Comments: Positive mild paravertebral muscle contraction lumbar sacral area positive mild tenderness right sciatic notch straight leg raising 90 degrees bilateral   Skin:     General: Skin is warm and dry.   Neurological:      General: No focal deficit present.      Mental Status: He is alert.      Motor: No weakness.      Gait: Gait normal.      Deep Tendon Reflexes: Reflexes " normal.   Psychiatric:         Mood and Affect: Mood normal.

## 2024-10-02 NOTE — ASSESSMENT & PLAN NOTE
Patient is intolerant of Otezla to follow with rheumatology    Orders:    predniSONE 20 mg tablet; Take 3 tablets daily for 3 days, 2 tablets daily for 3 days, 1 tablet daily for 4 days.  Take with food

## 2024-10-02 NOTE — ASSESSMENT & PLAN NOTE
Prednisone 20 mg tapering dose ordered    Orders:    predniSONE 20 mg tablet; Take 3 tablets daily for 3 days, 2 tablets daily for 3 days, 1 tablet daily for 4 days.  Take with food

## 2024-10-02 NOTE — ASSESSMENT & PLAN NOTE
Orders:    predniSONE 20 mg tablet; Take 3 tablets daily for 3 days, 2 tablets daily for 3 days, 1 tablet daily for 4 days.  Take with food

## 2024-10-02 NOTE — PATIENT INSTRUCTIONS
Patient to finish prednisone 20 mg tablets as follows, 3 tablets once daily food for 3 days, 2 tablets once daily food for 3 days, 1 tablet daily food for 4 days  Use tizanidine as needed every 8 hours for back pain/muscle spasm.  Do not drive or operation until side effect is known this may cause drowsiness  Follow-up with Dr. Castro for intolerance of Otezla  Return at scheduled appointment sooner if needed

## 2024-10-14 PROBLEM — M51.26 LUMBAR DISC HERNIATION: Status: ACTIVE | Noted: 2024-10-14

## 2024-10-16 ENCOUNTER — OFFICE VISIT (OUTPATIENT)
Dept: FAMILY MEDICINE CLINIC | Facility: CLINIC | Age: 54
End: 2024-10-16
Payer: COMMERCIAL

## 2024-10-16 VITALS
BODY MASS INDEX: 29.99 KG/M2 | HEART RATE: 81 BPM | OXYGEN SATURATION: 97 % | SYSTOLIC BLOOD PRESSURE: 130 MMHG | WEIGHT: 214.2 LBS | HEIGHT: 71 IN | TEMPERATURE: 98.7 F | DIASTOLIC BLOOD PRESSURE: 90 MMHG

## 2024-10-16 DIAGNOSIS — M51.26 LUMBAR DISC HERNIATION: ICD-10-CM

## 2024-10-16 DIAGNOSIS — E78.2 MIXED HYPERLIPIDEMIA: ICD-10-CM

## 2024-10-16 DIAGNOSIS — M54.40 BACK PAIN OF LUMBAR REGION WITH SCIATICA: Primary | ICD-10-CM

## 2024-10-16 DIAGNOSIS — M35.2: ICD-10-CM

## 2024-10-16 DIAGNOSIS — M13.80 SERONEGATIVE INFLAMMATORY ARTHRITIS: ICD-10-CM

## 2024-10-16 PROCEDURE — 99214 OFFICE O/P EST MOD 30 MIN: CPT | Performed by: PHYSICIAN ASSISTANT

## 2024-10-16 RX ORDER — GABAPENTIN 300 MG/1
300 CAPSULE ORAL 3 TIMES DAILY
Qty: 90 CAPSULE | Refills: 5 | Status: SHIPPED | OUTPATIENT
Start: 2024-10-16 | End: 2025-04-14

## 2024-10-16 NOTE — PATIENT INSTRUCTIONS
Assessment/plan:  1.  Sciatica-right-sided-patient did have MRI in August with [facility] which did show some chronic disc disease.  I suspect this has worsened and seems to be pressing on his right L5 or S1 nerve root.  Would recommend follow-up with orthopedic spine specialist.  Patient will contact [facility] for this.  In the meantime I would recommend starting physical therapy and I will also give him gabapentin 300 mg to be used up to 3 times daily as necessary.  He has seemed to have only minimal benefit with prednisone or tizanidine therapy.  2.  Gouty arthritis-patient continues to follow with rheumatology.  3.  Hyperlipidemia-stable with atorvastatin 20 mg daily, no medication changes.  4.  Seronegative inflammatory arthritis-stable per rheumatology.

## 2024-10-16 NOTE — PROGRESS NOTES
Ambulatory Visit  Name: Jose Miguel Clayton      : 1970      MRN: [id number]  Encounter Provider: Curly Meraz PA-C  Encounter Date: 10/16/2024   Encounter department: [facility]  Patient Instructions       Assessment/plan:  1.  Sciatica-right-sided-patient did have MRI in August with [facility] which did show some chronic disc disease.  I suspect this has worsened and seems to be pressing on his right L5 or S1 nerve root.  Would recommend follow-up with orthopedic spine specialist.  Patient will contact [facility] for this.  In the meantime I would recommend starting physical therapy and I will also give him gabapentin 300 mg to be used up to 3 times daily as necessary.  He has seemed to have only minimal benefit with prednisone or tizanidine therapy.  2.  Gouty arthritis-patient continues to follow with rheumatology.  3.  Hyperlipidemia-stable with atorvastatin 20 mg daily, no medication changes.  4.  Seronegative inflammatory arthritis-stable per rheumatology.    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  He complains of increased pain for the past several weeks down his right buttock and leg.  This seems to be radiating in a fashion of sciatica and is causing some numbness in his foot.  He is not having trouble with weakness in the leg or stumbling.  He was seen by Dr. Escamilla about 2 weeks ago and given prednisone and tizanidine  "which seemed to help a little bit but did not get rid of the problem.  He did see orthopedic specialist about 2 months ago and had MRI which showed multiple levels of disc disease.  He is not sure what to do next.  He does continue to follow with rheumatology regularly for inflammatory arthritis and gouty arthritis.          Review of Systems   Constitutional:  Negative for chills, fatigue and fever.   HENT:  Negative for congestion, ear pain and sinus pressure.    Eyes:  Negative for visual disturbance.   Respiratory:  Negative for cough, chest tightness and shortness of breath.    Cardiovascular:  Negative for chest pain and palpitations.   Gastrointestinal:  Negative for diarrhea, nausea and vomiting.   Endocrine: Negative for polyuria.   Genitourinary:  Negative for dysuria and frequency.   Musculoskeletal:  Positive for arthralgias, back pain and myalgias.   Skin:  Negative for pallor and rash.   Neurological:  Negative for dizziness, weakness, light-headedness, numbness and headaches.   Psychiatric/Behavioral:  Negative for agitation, behavioral problems and sleep disturbance.    All other systems reviewed and are negative.          Objective     /90 (BP Location: Left arm, Patient Position: Sitting, Cuff Size: Adult)   Pulse 81   Temp 98.7 °F (37.1 °C) (Tympanic)   Ht 5' 11\" (1.803 m)   Wt 97.2 kg (214 lb 3.2 oz)   SpO2 97%   BMI 29.87 kg/m²     Physical Exam  Vitals and nursing note reviewed.   Constitutional:       General: He is not in acute distress.     Appearance: He is well-developed.   HENT:      Head: Normocephalic and atraumatic.      Right Ear: External ear normal.      Left Ear: External ear normal.      Nose: Nose normal.      Mouth/Throat:      Pharynx: No oropharyngeal exudate.   Eyes:      Conjunctiva/sclera: Conjunctivae normal.      Pupils: Pupils are equal, round, and reactive to light.   Neck:      Thyroid: No thyromegaly.      Trachea: No tracheal deviation.   Cardiovascular: "      Rate and Rhythm: Normal rate and regular rhythm.      Heart sounds: Normal heart sounds. No murmur heard.     No friction rub.   Pulmonary:      Effort: Pulmonary effort is normal. No respiratory distress.      Breath sounds: Normal breath sounds. No wheezing or rales.   Abdominal:      General: Bowel sounds are normal. There is no distension.      Palpations: Abdomen is soft.      Tenderness: There is no abdominal tenderness. There is no guarding or rebound.   Musculoskeletal:         General: Tenderness present.      Cervical back: Normal range of motion and neck supple.      Comments: Patient does have some tenderness at the right SI joint.  He does have limited range of motion with right flexion of the lumbar spine, forward flexion of the lumbar spine, and extension of the lumbar spine.   Lymphadenopathy:      Cervical: No cervical adenopathy.   Skin:     General: Skin is warm and dry.      Findings: No erythema or rash.   Neurological:      Mental Status: He is alert and oriented to person, place, and time.      Cranial Nerves: No cranial nerve deficit.      Coordination: Coordination normal.   Psychiatric:         Behavior: Behavior normal.         Thought Content: Thought content normal.

## 2024-10-31 ENCOUNTER — HOSPITAL ENCOUNTER (EMERGENCY)
Facility: HOSPITAL | Age: 54
Discharge: HOME | End: 2024-10-31
Attending: EMERGENCY MEDICINE
Payer: COMMERCIAL

## 2024-10-31 VITALS
TEMPERATURE: 99.5 F | HEART RATE: 75 BPM | RESPIRATION RATE: 20 BRPM | DIASTOLIC BLOOD PRESSURE: 85 MMHG | BODY MASS INDEX: 30.75 KG/M2 | WEIGHT: 220.46 LBS | OXYGEN SATURATION: 95 % | SYSTOLIC BLOOD PRESSURE: 128 MMHG

## 2024-10-31 DIAGNOSIS — B34.9 VIRAL SYNDROME: ICD-10-CM

## 2024-10-31 DIAGNOSIS — U07.1 COVID-19: Primary | ICD-10-CM

## 2024-10-31 LAB
ALBUMIN SERPL BCG-MCNC: 3.9 G/DL (ref 3.5–5)
ALP SERPL-CCNC: 68 U/L (ref 34–104)
ALT SERPL W P-5'-P-CCNC: 42 U/L (ref 7–52)
ANION GAP SERPL CALCULATED.3IONS-SCNC: 7 MMOL/L (ref 4–13)
AST SERPL W P-5'-P-CCNC: 38 U/L (ref 13–39)
BASOPHILS # BLD AUTO: 0.05 THOUSANDS/ΜL (ref 0–0.1)
BASOPHILS NFR BLD AUTO: 1 % (ref 0–1)
BILIRUB SERPL-MCNC: 0.48 MG/DL (ref 0.2–1)
BUN SERPL-MCNC: 13 MG/DL (ref 5–25)
CALCIUM SERPL-MCNC: 8.8 MG/DL (ref 8.4–10.2)
CHLORIDE SERPL-SCNC: 104 MMOL/L (ref 96–108)
CO2 SERPL-SCNC: 25 MMOL/L (ref 21–32)
CREAT SERPL-MCNC: 0.9 MG/DL (ref 0.6–1.3)
EOSINOPHIL # BLD AUTO: 0.07 THOUSAND/ΜL (ref 0–0.61)
EOSINOPHIL NFR BLD AUTO: 1 % (ref 0–6)
ERYTHROCYTE [DISTWIDTH] IN BLOOD BY AUTOMATED COUNT: 12.3 % (ref 11.6–15.1)
FLUAV AG UPPER RESP QL IA.RAPID: NEGATIVE
FLUBV AG UPPER RESP QL IA.RAPID: NEGATIVE
GFR SERPL CREATININE-BSD FRML MDRD: 96 ML/MIN/1.73SQ M
GLUCOSE SERPL-MCNC: 116 MG/DL (ref 65–140)
HCT VFR BLD AUTO: 38.6 % (ref 36.5–49.3)
HGB BLD-MCNC: 12.9 G/DL (ref 12–17)
IMM GRANULOCYTES # BLD AUTO: 0.03 THOUSAND/UL (ref 0–0.2)
IMM GRANULOCYTES NFR BLD AUTO: 0 % (ref 0–2)
LYMPHOCYTES # BLD AUTO: 0.9 THOUSANDS/ΜL (ref 0.6–4.47)
LYMPHOCYTES NFR BLD AUTO: 11 % (ref 14–44)
MCH RBC QN AUTO: 31.9 PG (ref 26.8–34.3)
MCHC RBC AUTO-ENTMCNC: 33.4 G/DL (ref 31.4–37.4)
MCV RBC AUTO: 95 FL (ref 82–98)
MONOCYTES # BLD AUTO: 0.71 THOUSAND/ΜL (ref 0.17–1.22)
MONOCYTES NFR BLD AUTO: 8 % (ref 4–12)
NEUTROPHILS # BLD AUTO: 6.67 THOUSANDS/ΜL (ref 1.85–7.62)
NEUTS SEG NFR BLD AUTO: 79 % (ref 43–75)
NRBC BLD AUTO-RTO: 0 /100 WBCS
PLATELET # BLD AUTO: 288 THOUSANDS/UL (ref 149–390)
PMV BLD AUTO: 8.5 FL (ref 8.9–12.7)
POTASSIUM SERPL-SCNC: 3.8 MMOL/L (ref 3.5–5.3)
PROT SERPL-MCNC: 6.3 G/DL (ref 6.4–8.4)
RBC # BLD AUTO: 4.05 MILLION/UL (ref 3.88–5.62)
SARS-COV+SARS-COV-2 AG RESP QL IA.RAPID: POSITIVE
SODIUM SERPL-SCNC: 136 MMOL/L (ref 135–147)
WBC # BLD AUTO: 8.43 THOUSAND/UL (ref 4.31–10.16)

## 2024-10-31 PROCEDURE — 99283 EMERGENCY DEPT VISIT LOW MDM: CPT

## 2024-10-31 PROCEDURE — 96375 TX/PRO/DX INJ NEW DRUG ADDON: CPT

## 2024-10-31 PROCEDURE — 96367 TX/PROPH/DG ADDL SEQ IV INF: CPT

## 2024-10-31 PROCEDURE — 80053 COMPREHEN METABOLIC PANEL: CPT | Performed by: EMERGENCY MEDICINE

## 2024-10-31 PROCEDURE — 85025 COMPLETE CBC W/AUTO DIFF WBC: CPT | Performed by: EMERGENCY MEDICINE

## 2024-10-31 PROCEDURE — 36415 COLL VENOUS BLD VENIPUNCTURE: CPT | Performed by: EMERGENCY MEDICINE

## 2024-10-31 PROCEDURE — 96365 THER/PROPH/DIAG IV INF INIT: CPT

## 2024-10-31 PROCEDURE — 96368 THER/DIAG CONCURRENT INF: CPT

## 2024-10-31 PROCEDURE — 96366 THER/PROPH/DIAG IV INF ADDON: CPT

## 2024-10-31 PROCEDURE — 99284 EMERGENCY DEPT VISIT MOD MDM: CPT | Performed by: EMERGENCY MEDICINE

## 2024-10-31 PROCEDURE — 87804 INFLUENZA ASSAY W/OPTIC: CPT | Performed by: EMERGENCY MEDICINE

## 2024-10-31 PROCEDURE — 87811 SARS-COV-2 COVID19 W/OPTIC: CPT | Performed by: EMERGENCY MEDICINE

## 2024-10-31 RX ORDER — ACETAMINOPHEN 10 MG/ML
1000 INJECTION, SOLUTION INTRAVENOUS ONCE
Status: COMPLETED | OUTPATIENT
Start: 2024-10-31 | End: 2024-10-31

## 2024-10-31 RX ORDER — METOCLOPRAMIDE HYDROCHLORIDE 5 MG/ML
10 INJECTION INTRAMUSCULAR; INTRAVENOUS ONCE
Status: COMPLETED | OUTPATIENT
Start: 2024-10-31 | End: 2024-10-31

## 2024-10-31 RX ORDER — IBUPROFEN 600 MG/1
600 TABLET, FILM COATED ORAL EVERY 6 HOURS PRN
Qty: 30 TABLET | Refills: 0 | Status: SHIPPED | OUTPATIENT
Start: 2024-10-31

## 2024-10-31 RX ORDER — KETOROLAC TROMETHAMINE 30 MG/ML
15 INJECTION, SOLUTION INTRAMUSCULAR; INTRAVENOUS ONCE
Status: COMPLETED | OUTPATIENT
Start: 2024-10-31 | End: 2024-10-31

## 2024-10-31 RX ORDER — METOCLOPRAMIDE 10 MG/1
10 TABLET ORAL EVERY 6 HOURS PRN
Qty: 20 TABLET | Refills: 0 | Status: SHIPPED | OUTPATIENT
Start: 2024-10-31

## 2024-10-31 RX ORDER — ACETAMINOPHEN 500 MG
1000 TABLET ORAL EVERY 6 HOURS PRN
Qty: 30 TABLET | Refills: 0 | Status: SHIPPED | OUTPATIENT
Start: 2024-10-31

## 2024-10-31 RX ORDER — MAGNESIUM OXIDE 400 MG/1
400 TABLET ORAL
Qty: 30 TABLET | Refills: 0 | Status: SHIPPED | OUTPATIENT
Start: 2024-10-31

## 2024-10-31 RX ORDER — MAGNESIUM SULFATE HEPTAHYDRATE 40 MG/ML
2 INJECTION, SOLUTION INTRAVENOUS ONCE
Status: COMPLETED | OUTPATIENT
Start: 2024-10-31 | End: 2024-10-31

## 2024-10-31 RX ADMIN — SODIUM CHLORIDE, SODIUM LACTATE, POTASSIUM CHLORIDE, AND CALCIUM CHLORIDE 2000 ML: .6; .31; .03; .02 INJECTION, SOLUTION INTRAVENOUS at 03:42

## 2024-10-31 RX ADMIN — METOCLOPRAMIDE 10 MG: 5 INJECTION, SOLUTION INTRAMUSCULAR; INTRAVENOUS at 03:33

## 2024-10-31 RX ADMIN — KETOROLAC TROMETHAMINE 15 MG: 30 INJECTION, SOLUTION INTRAMUSCULAR; INTRAVENOUS at 03:33

## 2024-10-31 RX ADMIN — MAGNESIUM SULFATE HEPTAHYDRATE 2 G: 40 INJECTION, SOLUTION INTRAVENOUS at 03:42

## 2024-10-31 RX ADMIN — ACETAMINOPHEN 1000 MG: 10 INJECTION INTRAVENOUS at 03:30

## 2024-10-31 NOTE — Clinical Note
Jose Miguel Clayton was seen and treated in our emergency department on 10/31/2024.                Diagnosis:     Jose Miguel  may return to work on return date.    He may return on this date: 11/07/2024         If you have any questions or concerns, please don't hesitate to call.      Keenan Donald Jr., DO    ______________________________           _______________          _______________  Hospital Representative                              Date                                Time

## 2024-10-31 NOTE — ED PROVIDER NOTES
Time reflects when diagnosis was documented in both MDM as applicable and the Disposition within this note       Time User Action Codes Description Comment    10/31/2024  4:33 AM Keenan Donald [U07.1] COVID-19     10/31/2024  4:33 AM Keenan Donald [B34.9] Viral syndrome           ED Disposition       ED Disposition   Discharge    Condition   Stable    Date/Time   u Oct 31, 2024  4:33 AM    Comment   Jose Miguel Clayton discharge to home/self care.                   Assessment & Plan       Medical Decision Making  Patient has signs and symptoms that do appear consistent with a viral syndrome.    Patient is well appearing and neurologically intact. Headache was not acute or maximal in onset. Do not suspect SAH, temporal arteritis, encephalitis, CO poisoning, acute angle closure glaucoma, dural venous sinus thrombosis as cause of headache.   Possible early viral meningitis but patient has an overall reassuring exam and normal neuroexam.  I do not feel that further workup is needed at this time for that.      In regards to his other symptoms and tachycardia I will check labs, hydrate with IV fluids, respiratory viral panel and treat symptomatically.  Temperature is 99.5 which is probably contributing to the tachycardia.  He has no chest pain and shortness of breath is mostly from nasal congestion.  Heart and lung sounds are otherwise normal.  I do not feel that any further workup with troponin or EKG is needed at this time unless it is persisting or symptoms change.    The patient is stable and has a history and physical exam consistent with a viral illness.   Labs are overall normal.  Patient is positive for COVID which would explain his symptoms.  He is feeling much better at this time.  Will discharge once IV therapy is complete.  I considered the patient's other medical conditions as applicable/noted above in my medical decision making.  The patient improved upon discharge.       The plan is  "for supportive care at home.  Symptomatic therapy suggested: rest, increase fluids, gargle prn for sore throat, apply heat to sinuses prn, OTC acetaminophen, ibuprofen, antihistamine-decongestant of choice, cough suppressant of choice, apply cold packs prn, gradual reintroduction of usual activities, and call prn if symptoms persist or worsen. Call or go to PCP if these symptoms persist or fail to improve as anticipated. Return to ER precautions discussed as well.    Prior to discharge, discharge instructions were discussed with patient at bedside. Patient was provided both verbal and written instructions. Patient is understanding of the discharge instructions and is agreeable to plan of care. Return precautions were discussed with patient bedside, patient verbalized understanding of signs and symptoms that would necessitate return to the ED. All questions were answered. Patient was comfortable with the plan of care and discharged to home.     Portions of this chart may have been written with voice recognition software.  Occasional grammatical errors, wrong word or \"sound a like\" substitutions may have occurred due to software limitations.  Please read carefully and use context to recognize where substitutions have occurred.       Amount and/or Complexity of Data Reviewed  Labs: ordered. Decision-making details documented in ED Course.    Risk  OTC drugs.  Prescription drug management.        ED Course as of 10/31/24 0515   Thu Oct 31, 2024   0412 FLU/COVID Rapid Antigen (30 min. TAT) - Preferred screening test in ED(!)  Positive for COVID.  This would explain his symptoms   0413 Comprehensive metabolic panel(!)  Normal    0413 CBC and differential(!)  Normal        Medications   lactated ringers bolus 2,000 mL (2,000 mL Intravenous New Bag 10/31/24 0342)   ketorolac (TORADOL) injection 15 mg (15 mg Intravenous Given 10/31/24 0333)   metoclopramide (REGLAN) injection 10 mg (10 mg Intravenous Given 10/31/24 0333) "   acetaminophen (Ofirmev) injection 1,000 mg (0 mg Intravenous Stopped 10/31/24 0348)   magnesium sulfate 2 g/50 mL IVPB (premix) 2 g (2 g Intravenous New Bag 10/31/24 0342)       ED Risk Strat Scores                           SBIRT 22yo+      Flowsheet Row Most Recent Value   Initial Alcohol Screen: US AUDIT-C     1. How often do you have a drink containing alcohol? 0 Filed at: 10/31/2024 0241   2. How many drinks containing alcohol do you have on a typical day you are drinking?  0 Filed at: 10/31/2024 0241   3a. Male UNDER 65: How often do you have five or more drinks on one occasion? 0 Filed at: 10/31/2024 0241   3b. FEMALE Any Age, or MALE 65+: How often do you have 4 or more drinks on one occassion? 0 Filed at: 10/31/2024 0241   Audit-C Score 0 Filed at: 10/31/2024 0241   TEE: How many times in the past year have you...    Used an illegal drug or used a prescription medication for non-medical reasons? Never Filed at: 10/31/2024 0241                            History of Present Illness       Chief Complaint   Patient presents with    Flu Symptoms     States severe headache since this morning. Also complaining of fever, body aches and nausea.       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  Biopsies confirmed again in 2014 in 2015. Dr. Agarwal, EGD 12/12/2017 Positive intestinal metaplasia without dysplasia. EGD 2020 long segment Brennan's esophagus, biopsy negative.     COVID-19     COVID-19 virus infection 12/16/2020    15Dec: poitive test.16Dec:  Day 4. Some issues with SOB and cough. 18Dec: Day 6: Feeling better overall. Increased cough. 21Dec: Day 9: First symptoms 11Dec. Continues with minor symptoms.  22Dec: Day 10. Has imporved a bit. Add Phenergan at HS. 12/23/2020.  Released from isolation see note 12/28: Still reporting lingering symptoms of COVID.    GERD (gastroesophageal reflux disease) 08/30/2012     Description: s/p Carlos fundoplication, Dr. Valadez    Herpes simplex type 1 infection 06/18/2014    Hyperlipidemia 08/30/2012    Lymphadenopathy 08/30/2012     Benign Cervical per Dr. Tomy Yeager    Vitamin D deficiency 05/06/2017      Past Surgical History:   Procedure Laterality Date    DEEP NECK LYMPH NODE BIOPSY / EXCISION      cervical node biopsy with dissection of jugular nodes    EGD  01/2020    Dr Agarwal, [facility].Nissen fundoplication evidencing gastric fundus without abnormality, Long segment Brennan's esophagus without dysplasia, bx negative.     EGD  11/10/2021    4 cm Brennan's esophagus biopsy positive for intestinal metaplasia and no dysplasia, biopsies stomach negative for H. pylori, Nissen intact by Dr. Agarwal    ESOPHAGOGASTRIC FUNDOPLASTY  03/2016    Nissen Fundoplication, Dr Valadez       Family History   Problem Relation Age of Onset    Heart attack Father         Anteroapical MI    Heart disease Father     Leukemia Brother       Social History     Tobacco Use    Smoking status: Never    Smokeless tobacco: Never   Vaping Use    Vaping status: Never Used   Substance Use Topics    Alcohol use: Yes     Alcohol/week: 1.0 - 2.0 standard drink of alcohol     Types: 1 - 2 Standard drinks or equivalent per week     Comment: socially    Drug use: Not Currently      E-Cigarette/Vaping    E-Cigarette Use Never User       E-Cigarette/Vaping Substances      I have reviewed and agree with the history as documented.     Patient is a 54-year-old male that presents for less than 24 hours of viral symptoms.  He states that he woke up yesterday morning with bodyaches, nausea, congestion and gradually developed headache in the morning.  Tried taking DayQuil and Advil without relief of symptoms.  Denies any known sick contacts.        History provided by:  Patient   used: No    Flu Symptoms  Presenting symptoms: cough, fatigue, fever, headache, myalgias, nausea, shortness of breath and sore throat     Presenting symptoms: no diarrhea and no vomiting    Associated symptoms: chills and nasal congestion    Associated symptoms: no neck stiffness        Review of Systems   Constitutional:  Positive for activity change, appetite change, chills, fatigue and fever.   HENT:  Positive for congestion, sinus pressure, sinus pain and sore throat. Negative for facial swelling and mouth sores.    Eyes:  Negative for discharge, redness and visual disturbance.   Respiratory:  Positive for cough and shortness of breath.    Cardiovascular:  Negative for chest pain and leg swelling.   Gastrointestinal:  Positive for nausea. Negative for diarrhea and vomiting.   Genitourinary:  Negative for dysuria.   Musculoskeletal:  Positive for myalgias. Negative for neck pain and neck stiffness.   Skin:  Negative for color change, pallor, rash and wound.   Allergic/Immunologic: Negative for immunocompromised state.   Neurological:  Positive for headaches. Negative for syncope and weakness.   All other systems reviewed and are negative.          Objective       ED Triage Vitals [10/31/24 0235]   Temperature Pulse Blood Pressure Respirations SpO2 Patient Position - Orthostatic VS   99.5 °F (37.5 °C) (!) 115 148/95 20 96 % Sitting      Temp Source Heart Rate Source BP Location FiO2 (%) Pain Score    Oral Monitor Right arm -- 8      Vitals      Date and Time Temp Pulse SpO2 Resp BP Pain Score FACES Pain Rating User   10/31/24 0510 -- 84 95 % 18 128/85 -- -- TP   10/31/24 0333 -- -- -- -- -- 8 -- TP   10/31/24 0238 -- -- -- -- -- 8 -- TP   10/31/24 0235 99.5 °F (37.5 °C) 115 96 % 20 148/95 8 -- RC            Physical Exam  Vitals and nursing note reviewed.   Constitutional:       General: He is not in acute distress.     Appearance: He is well-developed. He is not ill-appearing, toxic-appearing or diaphoretic.   HENT:      Head: Normocephalic and atraumatic. No right periorbital erythema or left periorbital erythema.      Jaw: No trismus.         Right Ear: External ear normal.      Left Ear: External ear normal.      Nose: No congestion.   Eyes:      General: No scleral icterus.     Conjunctiva/sclera: Conjunctivae normal.      Right eye: Right conjunctiva is not injected.      Left eye: Left conjunctiva is not injected.      Pupils: Pupils are equal, round, and reactive to light.   Neck:      Trachea: No tracheal deviation.   Cardiovascular:      Rate and Rhythm: Regular rhythm. Tachycardia present.      Pulses: Normal pulses.      Heart sounds: Normal heart sounds. No murmur heard.     No friction rub.   Pulmonary:      Effort: Pulmonary effort is normal. No respiratory distress.      Breath sounds: Normal breath sounds. No stridor. No wheezing or rales.   Abdominal:      General: There is no distension.      Palpations: Abdomen is soft.      Tenderness: There is no abdominal tenderness. There is no guarding or rebound.   Musculoskeletal:         General: No tenderness or deformity. Normal range of motion.      Cervical back: Normal range of motion and neck supple.   Skin:     General: Skin is warm and dry.      Capillary Refill: Capillary refill takes less than 2 seconds.      Coloration: Skin is not pale.      Findings: No erythema or rash.   Neurological:      General: No focal deficit present.      Mental Status: He is alert and oriented to person, place, and time.      Cranial Nerves: Cranial nerves 2-12 are intact. No cranial nerve deficit, dysarthria or facial asymmetry.      Motor: Motor function is intact. No tremor, abnormal muscle tone or pronator drift.      Coordination: Finger-Nose-Finger Test normal.   Psychiatric:         Mood and Affect: Mood normal.         Behavior: Behavior normal.         Results Reviewed       Procedure Component Value Units Date/Time    FLU/COVID Rapid Antigen (30 min. TAT) - Preferred screening test in ED [[id number]]  (Abnormal) Collected: 10/31/24 0329    Lab Status: Final result Specimen: Nares from Nose Updated:  10/31/24 0356     SARS COV Rapid Antigen Positive     Influenza A Rapid Antigen Negative     Influenza B Rapid Antigen Negative    Narrative:      This test has been performed using the Quidel Hannah 2 FLU+SARS Antigen test under the Emergency Use Authorization (EUA). This test has been validated by the  and verified by the performing laboratory. The Hannah uses lateral flow immunofluorescent sandwich assay to detect SARS-COV, Influenza A and Influenza B Antigen.     The Quidel Hannah 2 SARS Antigen test does not differentiate between SARS-CoV and SARS-CoV-2.     Negative results are presumptive and may be confirmed with a molecular assay, if necessary, for patient management. Negative results do not rule out SARS-CoV-2 or influenza infection and should not be used as the sole basis for treatment or patient management decisions. A negative test result may occur if the level of antigen in a sample is below the limit of detection of this test.     Positive results are indicative of the presence of viral antigens, but do not rule out bacterial infection or co-infection with other viruses.     All test results should be used as an adjunct to clinical observations and other information available to the provider.    FOR PEDIATRIC PATIENTS - copy/paste COVID Guidelines URL to browser: [email or web address]    Comprehensive metabolic panel [[id number]]  (Abnormal) Collected: 10/31/24 0329    Lab Status: Final result Specimen: Blood from Arm, Left Updated: 10/31/24 0353     Sodium 136 mmol/L      Potassium 3.8 mmol/L      Chloride 104 mmol/L      CO2 25 mmol/L      ANION GAP 7 mmol/L      BUN 13 mg/dL      Creatinine 0.90 mg/dL      Glucose 116 mg/dL      Calcium 8.8 mg/dL      AST 38 U/L      ALT 42 U/L      Alkaline Phosphatase 68 U/L      Total Protein 6.3 g/dL      Albumin 3.9 g/dL      Total Bilirubin 0.48 mg/dL      eGFR 96 ml/min/1.73sq m     Narrative:       National Kid[employer] Disease Foundation guidelines for Chronic Kidney Disease (CKD):     Stage 1 with normal or high GFR (GFR > 90 mL/min/1.73 square meters)    Stage 2 Mild CKD (GFR = 60-89 mL/min/1.73 square meters)    Stage 3A Moderate CKD (GFR = 45-59 mL/min/1.73 square meters)    Stage 3B Moderate CKD (GFR = 30-44 mL/min/1.73 square meters)    Stage 4 Severe CKD (GFR = 15-29 mL/min/1.73 square meters)    Stage 5 End Stage CKD (GFR <15 mL/min/1.73 square meters)  Note: GFR calculation is accurate only with a steady state creatinine    CBC and differential [[id number]]  (Abnormal) Collected: 10/31/24 0329    Lab Status: Final result Specimen: Blood from Arm, Left Updated: 10/31/24 0339     WBC 8.43 Thousand/uL      RBC 4.05 Million/uL      Hemoglobin 12.9 g/dL      Hematocrit 38.6 %      MCV 95 fL      MCH 31.9 pg      MCHC 33.4 g/dL      RDW 12.3 %      MPV 8.5 fL      Platelets 288 Thousands/uL      nRBC 0 /100 WBCs      Segmented % 79 %      Immature Grans % 0 %      Lymphocytes % 11 %      Monocytes % 8 %      Eosinophils Relative 1 %      Basophils Relative 1 %      Absolute Neutrophils 6.67 Thousands/µL      Absolute Immature Grans 0.03 Thousand/uL      Absolute Lymphocytes 0.90 Thousands/µL      Absolute Monocytes 0.71 Thousand/µL      Eosinophils Absolute 0.07 Thousand/µL      Basophils Absolute 0.05 Thousands/µL             No orders to display       Procedures    ED Medication and Procedure Management   Prior to Admission Medications   Prescriptions Last Dose Informant Patient Reported? Taking?   Apremilast (Otezla) 30 MG TABS   Yes No   Sig: Take 1 tablet twice a day by oral route.   Cholecalciferol (Vitamin D) 50 MCG (2000 UT) tablet   Yes No   Sig: Take 2,000 Units by mouth daily   atorvastatin (LIPITOR) 20 mg tablet   No No   Sig: Take 1 tablet (20 mg total) by mouth daily   gabapentin (NEURONTIN) 300 mg capsule   No No   Sig: Take 1 capsule (300 mg total) by mouth 3 (three) times a day   sulfaSALAzine  (AZULFIDINE-EN) 500 mg EC tablet   Yes No   Sig: Take 1,000 mg by mouth 2 (two) times a day   tiZANidine (ZANAFLEX) 4 mg tablet   No No   Sig: Take 1 tablet (4 mg total) by mouth every 8 (eight) hours as needed for muscle spasms      Facility-Administered Medications: None     Current Discharge Medication List        START taking these medications    Details   acetaminophen (TYLENOL) 500 mg tablet Take 2 tablets (1,000 mg total) by mouth every 6 (six) hours as needed for mild pain or fever  Qty: 30 tablet, Refills: 0    Associated Diagnoses: COVID-19; Viral syndrome      ibuprofen (MOTRIN) 600 mg tablet Take 1 tablet (600 mg total) by mouth every 6 (six) hours as needed for moderate pain, fever or headaches  Qty: 30 tablet, Refills: 0    Associated Diagnoses: COVID-19; Viral syndrome      magnesium oxide (MAG-OX) 400 mg tablet Take 1 tablet (400 mg total) by mouth daily at bedtime as needed (sleep and headache)  Qty: 30 tablet, Refills: 0    Associated Diagnoses: COVID-19; Viral syndrome      metoclopramide (REGLAN) 10 mg tablet Take 1 tablet (10 mg total) by mouth every 6 (six) hours as needed (headaches, nausea)  Qty: 20 tablet, Refills: 0    Associated Diagnoses: COVID-19; Viral syndrome           CONTINUE these medications which have NOT CHANGED    Details   Apremilast (Otezla) 30 MG TABS Take 1 tablet twice a day by oral route.      atorvastatin (LIPITOR) 20 mg tablet Take 1 tablet (20 mg total) by mouth daily  Qty: 90 tablet, Refills: 3    Associated Diagnoses: Mixed hyperlipidemia      Cholecalciferol (Vitamin D) 50 MCG (2000 UT) tablet Take 2,000 Units by mouth daily      gabapentin (NEURONTIN) 300 mg capsule Take 1 capsule (300 mg total) by mouth 3 (three) times a day  Qty: 90 capsule, Refills: 5    Associated Diagnoses: Back pain of lumbar region with sciatica      sulfaSALAzine (AZULFIDINE-EN) 500 mg EC tablet Take 1,000 mg by mouth 2 (two) times a day      tiZANidine (ZANAFLEX) 4 mg tablet Take 1 tablet  (4 mg total) by mouth every 8 (eight) hours as needed for muscle spasms  Qty: 30 tablet, Refills: 1    Associated Diagnoses: Back pain of lumbar region with sciatica; Muscle spasm of back           No discharge procedures on file.  ED SEPSIS DOCUMENTATION   Time reflects when diagnosis was documented in both MDM as applicable and the Disposition within this note       Time User Action Codes Description Comment    10/31/2024  4:33 AM Keenan Donald [U07.1] COVID-19     10/31/2024  4:33 AM Keenan Donald [B34.9] Viral syndrome                  Keenan Donald Jr., DO  10/31/24 0515

## 2024-11-08 ENCOUNTER — TELEPHONE (OUTPATIENT)
Age: 54
End: 2024-11-08

## 2024-11-08 DIAGNOSIS — M13.80 SERONEGATIVE INFLAMMATORY ARTHRITIS: ICD-10-CM

## 2024-11-08 DIAGNOSIS — M35.2: Primary | ICD-10-CM

## 2024-11-08 DIAGNOSIS — M10.9 GOUTY ARTHRITIS: ICD-10-CM

## 2024-11-08 NOTE — TELEPHONE ENCOUNTER
Pt called in stating his existing Rheumatologist moved to [place]. So therefore he approached [facility]'s Rheumatologist to schedule an appt was requested to get an referral from his PCP. Kindly help with the request and call back patient once the referral is placed. Thanks

## 2024-12-09 ENCOUNTER — CONSULT (OUTPATIENT)
Dept: RHEUMATOLOGY | Facility: CLINIC | Age: 54
End: 2024-12-09
Payer: COMMERCIAL

## 2024-12-09 ENCOUNTER — APPOINTMENT (OUTPATIENT)
Dept: LAB | Facility: CLINIC | Age: 54
End: 2024-12-09
Payer: COMMERCIAL

## 2024-12-09 VITALS
DIASTOLIC BLOOD PRESSURE: 78 MMHG | OXYGEN SATURATION: 97 % | SYSTOLIC BLOOD PRESSURE: 126 MMHG | HEIGHT: 71 IN | HEART RATE: 75 BPM | WEIGHT: 210 LBS | BODY MASS INDEX: 29.4 KG/M2

## 2024-12-09 DIAGNOSIS — M13.80 SERONEGATIVE INFLAMMATORY ARTHRITIS: Primary | ICD-10-CM

## 2024-12-09 DIAGNOSIS — M13.80 SERONEGATIVE INFLAMMATORY ARTHRITIS: ICD-10-CM

## 2024-12-09 DIAGNOSIS — M35.2: ICD-10-CM

## 2024-12-09 DIAGNOSIS — M10.9 GOUTY ARTHRITIS: ICD-10-CM

## 2024-12-09 LAB
ANA SER QL IA: NEGATIVE
CRP SERPL QL: 2.7 MG/L
URATE SERPL-MCNC: 7.7 MG/DL (ref 3.5–8.5)

## 2024-12-09 PROCEDURE — 86140 C-REACTIVE PROTEIN: CPT

## 2024-12-09 PROCEDURE — 84550 ASSAY OF BLOOD/URIC ACID: CPT

## 2024-12-09 PROCEDURE — 99204 OFFICE O/P NEW MOD 45 MIN: CPT | Performed by: INTERNAL MEDICINE

## 2024-12-09 PROCEDURE — 86200 CCP ANTIBODY: CPT

## 2024-12-09 PROCEDURE — 86038 ANTINUCLEAR ANTIBODIES: CPT

## 2024-12-09 PROCEDURE — 36415 COLL VENOUS BLD VENIPUNCTURE: CPT

## 2024-12-09 PROCEDURE — 81373 HLA I TYPING 1 LOCUS LR: CPT

## 2024-12-09 PROCEDURE — 86430 RHEUMATOID FACTOR TEST QUAL: CPT

## 2024-12-09 RX ORDER — SULFASALAZINE 500 MG/1
500 TABLET ORAL 2 TIMES DAILY
Qty: 60 TABLET | Refills: 6 | Status: SHIPPED | OUTPATIENT
Start: 2024-12-09

## 2024-12-09 RX ORDER — DICLOFENAC SODIUM 75 MG/1
TABLET, DELAYED RELEASE ORAL
COMMUNITY
Start: 2024-10-23

## 2024-12-09 NOTE — PROGRESS NOTES
"  This is a Rheumatology Consult seen at the request of Dr. Escamilla      HPI: Luis Clayton is a 55 y/o male who presents for further evaluation Behcet's, seronegative inflammatory arthritis. He has past medical history HLD, GERD, anxiety    He has past medical history GERD s/p surgery. He is off PPI    Was dx with Behcet's per outside rheumatologist Dr Jhaveri ([facility]). Per patient he has had \"symptoms since childhood\"    Recurrent oral ulcers, \"sores in my scalp, acne\"    No rashes, inflammatory bowel disease    He has tried Otezla but did not tolerate due to severe nausea    Now on  BID and this has helped oral ulcers.     Also reports h/o gout (not on urate lowering therapy)    H/o chronic low back pain, DDD, radiculopathy follows with pain management  at [facility] and gets injections      --------------------------------------------------------------------------------------------------------        ROS:      - for: Fevers, Chills or sweats.  No HAs or scalp tenderness.  No jaw claudication.  No acute visual or eye changes.  No dry eyes.  No auditory complaints.  No oral lesions or ulcers.  No dry mouth.  No sore throat or cough.  No chest pains or palpitations.  No shortness of breath, dyspnea on exertion or wheezing.  No hemotpysis.  No abdominal pain, GERD symptoms, diarrhea or constipation.  No urinary complaints.  No numbness, tingling or weakness.  No rashes.    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  Biopsies confirmed again in 2014 in 2015. Dr. Agarwal, EGD 12/12/2017 Positive intestinal metaplasia without dysplasia. EGD 2020 long segment Brennan's esophagus, biopsy negative.     COVID-19     COVID-19 virus " infection 12/16/2020    15Dec: poitive test.16Dec:  Day 4. Some issues with SOB and cough. 18Dec: Day 6: Feeling better overall. Increased cough. 21Dec: Day 9: First symptoms 11Dec. Continues with minor symptoms.  22Dec: Day 10. Has imporved a bit. Add Phenergan at HS. 12/23/2020.  Released from isolation see note 12/28: Still reporting lingering symptoms of COVID.    GERD (gastroesophageal reflux disease) 08/30/2012    Description: s/p Carlos fundoplication, Dr. Valadez    Herpes simplex type 1 infection 06/18/2014    Hyperlipidemia 08/30/2012    Lymphadenopathy 08/30/2012     Benign Cervical per Dr. Tomy Yeager    Vitamin D deficiency 05/06/2017           Past Surgical History    Past Surgical History:   Procedure Laterality Date    DEEP NECK LYMPH NODE BIOPSY / EXCISION      cervical node biopsy with dissection of jugular nodes    EGD  01/2020    Dr Agarwal, [facility].Nissen fundoplication evidencing gastric fundus without abnormality, Long segment Brennan's esophagus without dysplasia, bx negative.     EGD  11/10/2021    4 cm Brennan's esophagus biopsy positive for intestinal metaplasia and no dysplasia, biopsies stomach negative for H. pylori, Nissen intact by Dr. Agarwal    ESOPHAGOGASTRIC FUNDOPLASTY  03/2016    Nissen Fundoplication, Dr Valadez            Family History    Family History   Problem Relation Age of Onset    Heart attack Father         Anteroapical MI    Heart disease Father     Leukemia Brother             Social History    Social History     Tobacco Use    Smoking status: Never    Smokeless tobacco: Never   Vaping Use    Vaping status: Never Used   Substance Use Topics    Alcohol use: Yes     Alcohol/week: 1.0 - 2.0 standard drink of alcohol     Types: 1 - 2 Standard drinks or equivalent per week     Comment: socially    Drug use: Not Currently            Allergies    Allergies   Allergen Reactions    Azithromycin GI Intolerance     Category: Adverse Reaction;     Penicillins      Other  "reaction(s): Other (See Comments)         Medications    Current Outpatient Medications   Medication Instructions    acetaminophen (TYLENOL) 1,000 mg, Oral, Every 6 hours PRN    Apremilast (Otezla) 30 MG TABS Take 1 tablet twice a day by oral route.    atorvastatin (LIPITOR) 20 mg, Oral, Daily    diclofenac (VOLTAREN) 75 mg EC tablet TAKE ONE TABLET TWO TIMES A DAY BY MOUTH AS NEEDED    gabapentin (NEURONTIN) 300 mg, Oral, 3 times daily    ibuprofen (MOTRIN) 600 mg, Oral, Every 6 hours PRN    magnesium oxide (MAG-OX) 400 mg, Oral, Daily at bedtime PRN    metoclopramide (REGLAN) 10 mg, Oral, Every 6 hours PRN    sulfaSALAzine (AZULFIDINE-EN) 1,000 mg, 2 times daily    tiZANidine (ZANAFLEX) 4 mg, Oral, Every 8 hours PRN    Vitamin D 2,000 Units, Daily          Physical Exam    /78   Pulse 75   Ht 5' 11\" (1.803 m)   Wt 95.3 kg (210 lb)   SpO2 97%   BMI 29.29 kg/m²     GEN: AAO, No apparent distress.  Patient is well developed.  HEENT:  Pupils are equal, round and reactive.  Sclera are clear.  Fundoscopic exam is normal.  External ears are without lesions.  Oral pharynx is clear of ulcers or other lesions.  MMM.   NECK:  Supple.  There is no adenopathy appreciable in anterior or posterior cervical chains or supraclavicularly.  JVP is normal.    HEART: Regular rate and rhythm.  There is no appreciable murmur, gallop or rub.  LUNGS: Clear to auscultation.  ABD:  Soft, without tenderness, rebound or guarding.  No appreciable organomegally.  NEURO: Speech and cognition are normal.  Strength is 5/5 throughout.  Tone is normal.  DTRs are 2/4 at the knees, ankles and elbows.  Gait is normal.  SKIN: There are no rashes or lesions    MUSCULOSKELETAL:   No synovitis noted      ________________________________________________________________________          Results Review    Component      Latest Ref Rng 10/31/2024   WBC      4.31 - 10.16 Thousand/uL 8.43    RBC      3.88 - 5.62 Million/uL 4.05    Hemoglobin      12.0 " - 17.0 g/dL 12.9    Hematocrit      36.5 - 49.3 % 38.6    MCV      82 - 98 fL 95    MCH      26.8 - 34.3 pg 31.9    MCHC      31.4 - 37.4 g/dL 33.4    RDW      11.6 - 15.1 % 12.3    MPV      8.9 - 12.7 fL 8.5 (L)    Platelet Count      149 - 390 Thousands/uL 288    nRBC      /100 WBCs 0    Segmented %      43 - 75 % 79 (H)    Immature Grans %      0 - 2 % 0    Lymphocytes %      14 - 44 % 11 (L)    Monocytes %      4 - 12 % 8    Eosinophils %      0 - 6 % 1    Basophils %      0 - 1 % 1    Absolute Neutrophils      1.85 - 7.62 Thousands/µL 6.67    Absolute Immature Grans      0.00 - 0.20 Thousand/uL 0.03    Absolute Lymphocytes      0.60 - 4.47 Thousands/µL 0.90    Absolute Monocytes      0.17 - 1.22 Thousand/µL 0.71    Absolute Eosinophils      0.00 - 0.61 Thousand/µL 0.07    Absolute Basophils      0.00 - 0.10 Thousands/µL 0.05    Sodium      135 - 147 mmol/L 136    Potassium      3.5 - 5.3 mmol/L 3.8    Chloride      96 - 108 mmol/L 104    Carbon Dioxide      21 - 32 mmol/L 25    ANION GAP      4 - 13 mmol/L 7    BUN      5 - 25 mg/dL 13    Creatinine      0.60 - 1.30 mg/dL 0.90    GLUCOSE      65 - 140 mg/dL 116    Calcium      8.4 - 10.2 mg/dL 8.8    AST      13 - 39 U/L 38    ALT      7 - 52 U/L 42    ALK PHOS      34 - 104 U/L 68    Total Protein      6.4 - 8.4 g/dL 6.3 (L)    Albumin      3.5 - 5.0 g/dL 3.9    Total Bilirubin      0.20 - 1.00 mg/dL 0.48    GFR, Calculated      ml/min/1.73sq m 96    URIC ACID      3.5 - 8.5 mg/dL       Legend:  (L) Low  (H) High              Impressions and Plans:    Luis Clayton is a 53 y/o male with h/o Behcet's, seronegative inflammatory arthritis    Transferring care from outside rheumatologist Dr Castro (I have called for records)    Chronic low back pain, DDD, radiculopathy follows with pain management at [facility]    Also reports h/o gout (not on urate lowering therapy)    Continues  BID (intolerant Otezla)    Check baseline labs    RTC 3 months        Thank you  for involving me in this patient's care.        Everardo Cordova MD  [facility]

## 2024-12-10 LAB
CCP AB SER IA-ACNC: 1 (ref ?–10)
RHEUMATOID FACT SER QL LA: NEGATIVE

## 2024-12-16 LAB — MISCELLANEOUS LAB TEST RESULT: NORMAL

## 2025-01-06 ENCOUNTER — OFFICE VISIT (OUTPATIENT)
Dept: FAMILY MEDICINE CLINIC | Facility: CLINIC | Age: 55
End: 2025-01-06
Payer: COMMERCIAL

## 2025-01-06 VITALS
HEART RATE: 104 BPM | BODY MASS INDEX: 30.1 KG/M2 | SYSTOLIC BLOOD PRESSURE: 130 MMHG | OXYGEN SATURATION: 99 % | WEIGHT: 215 LBS | HEIGHT: 71 IN | RESPIRATION RATE: 18 BRPM | DIASTOLIC BLOOD PRESSURE: 82 MMHG

## 2025-01-06 DIAGNOSIS — M35.2: Primary | ICD-10-CM

## 2025-01-06 DIAGNOSIS — M13.80 SERONEGATIVE INFLAMMATORY ARTHRITIS: ICD-10-CM

## 2025-01-06 DIAGNOSIS — M10.9 GOUTY ARTHRITIS: ICD-10-CM

## 2025-01-06 DIAGNOSIS — M51.26 LUMBAR DISC HERNIATION: ICD-10-CM

## 2025-01-06 PROCEDURE — 99214 OFFICE O/P EST MOD 30 MIN: CPT | Performed by: FAMILY MEDICINE

## 2025-01-06 RX ORDER — CELECOXIB 200 MG/1
200 CAPSULE ORAL DAILY
Qty: 30 CAPSULE | Refills: 0 | Status: SHIPPED | OUTPATIENT
Start: 2025-01-06

## 2025-01-06 NOTE — PROGRESS NOTES
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  Question if some of his symptoms are related to that, with the right lower extremity.  Also having right upper extremity symptoms at the same time, suggesting that this is a systemic issue.  Trial of Celebrex 200 mg daily, as well as check CRP again.  Has been quite a bit elevated in the past, though the last CRP was normal.  Orders:  •  celecoxib (CeleBREX) 200 mg capsule; Take 1 capsule (200 mg total) by mouth daily  •  C-reactive protein; Future    Gouty arthritis  History of gout, however not currently having significant problems.  Not on antiuric acid medications.  Following with rheumatology now.  Orders:  •  celecoxib (CeleBREX) 200 mg capsule; Take 1 capsule (200 mg total) by mouth daily  •  C-reactive protein; Future    Seronegative inflammatory arthritis  Check labs.  Follow-up with urology.  Orders:  •  celecoxib (CeleBREX) 200 mg capsule; Take 1 capsule (200 mg total) by mouth daily  •  C-reactive protein; Future    Lumbar disc herniation  In 2024, the patient had an MRI showing worsening of the disc herniation on the left, likely corresponding to some of his symptoms on the left thigh.  Does not seem to specifically explain the right lower extremity issues.    Orders:  •  celecoxib (CeleBREX) 200 mg capsule; Take 1 capsule (200 mg total) by mouth daily  •  C-reactive protein; Future           History of Present Illness     Here with numbness.  Has cramping in the right foot/leg.  In the last month, also having cramping in the right forearm at the same time as the right leg issues.    Has had back issues and needed injection before.  The injection helped some of the back issues (SI  "joint), except for the foot and calf.    No specific timing on this.  No signs of claudication.    In the office today, he is noticing some cramping in the right calf, and then also noticing some of the same symptoms in the right forearm.    Patient also has some irritation on the left thigh.  Anteriorly.  Burning.  Last for very short while, then resolves.        Review of Systems   Constitutional: Negative.    HENT: Negative.     Respiratory: Negative.     Musculoskeletal:         Per HPI       Objective   /82 (BP Location: Left arm, Patient Position: Sitting, Cuff Size: Large)   Pulse 104   Resp 18   Ht 5' 11\" (1.803 m)   Wt 97.5 kg (215 lb)   SpO2 99%   BMI 29.99 kg/m²      Physical Exam  Vitals and nursing note reviewed.   Constitutional:       Appearance: Normal appearance. He is well-developed.   HENT:      Head: Normocephalic and atraumatic.   Cardiovascular:      Rate and Rhythm: Normal rate and regular rhythm.      Pulses:           Carotid pulses are 2+ on the right side and 2+ on the left side.       Dorsalis pedis pulses are 2+ on the right side.        Posterior tibial pulses are 2+ on the right side.      Heart sounds: Normal heart sounds. No murmur heard.     No friction rub. No gallop.   Pulmonary:      Effort: Pulmonary effort is normal. No respiratory distress.      Breath sounds: Normal breath sounds. No wheezing or rales.   Musculoskeletal:      Cervical back: Normal range of motion and neck supple.      Right lower leg: No edema.      Left lower leg: No edema.      Comments: Tinel, Phalen, Finkelstein negative.   Neurological:      Mental Status: He is alert.         " pain

## 2025-01-06 NOTE — ASSESSMENT & PLAN NOTE
Check labs.  Follow-up with urology.  Orders:  •  celecoxib (CeleBREX) 200 mg capsule; Take 1 capsule (200 mg total) by mouth daily  •  C-reactive protein; Future

## 2025-01-06 NOTE — ASSESSMENT & PLAN NOTE
History of gout, however not currently having significant problems.  Not on antiuric acid medications.  Following with rheumatology now.  Orders:  •  celecoxib (CeleBREX) 200 mg capsule; Take 1 capsule (200 mg total) by mouth daily  •  C-reactive protein; Future

## 2025-01-06 NOTE — ASSESSMENT & PLAN NOTE
Recommend follow-up with rheumatology.  Question if some of his symptoms are related to that, with the right lower extremity.  Also having right upper extremity symptoms at the same time, suggesting that this is a systemic issue.  Trial of Celebrex 200 mg daily, as well as check CRP again.  Has been quite a bit elevated in the past, though the last CRP was normal.  Orders:  •  celecoxib (CeleBREX) 200 mg capsule; Take 1 capsule (200 mg total) by mouth daily  •  C-reactive protein; Future

## 2025-01-06 NOTE — ASSESSMENT & PLAN NOTE
In November 2024, the patient had an MRI showing worsening of the disc herniation on the left, likely corresponding to some of his symptoms on the left thigh.  Does not seem to specifically explain the right lower extremity issues.    Orders:  •  celecoxib (CeleBREX) 200 mg capsule; Take 1 capsule (200 mg total) by mouth daily  •  C-reactive protein; Future

## 2025-01-24 ENCOUNTER — OFFICE VISIT (OUTPATIENT)
Dept: FAMILY MEDICINE CLINIC | Facility: CLINIC | Age: 55
End: 2025-01-24
Payer: COMMERCIAL

## 2025-01-24 VITALS
HEART RATE: 74 BPM | SYSTOLIC BLOOD PRESSURE: 130 MMHG | BODY MASS INDEX: 30.72 KG/M2 | HEIGHT: 71 IN | OXYGEN SATURATION: 97 % | WEIGHT: 219.4 LBS | DIASTOLIC BLOOD PRESSURE: 86 MMHG

## 2025-01-24 DIAGNOSIS — M79.671 RIGHT FOOT PAIN: ICD-10-CM

## 2025-01-24 DIAGNOSIS — Z12.5 SCREENING FOR PROSTATE CANCER: ICD-10-CM

## 2025-01-24 DIAGNOSIS — E55.9 VITAMIN D DEFICIENCY: ICD-10-CM

## 2025-01-24 DIAGNOSIS — M79.601 RIGHT ARM PAIN: Primary | ICD-10-CM

## 2025-01-24 DIAGNOSIS — M13.80 SERONEGATIVE INFLAMMATORY ARTHRITIS: ICD-10-CM

## 2025-01-24 DIAGNOSIS — D75.839 THROMBOCYTOSIS: ICD-10-CM

## 2025-01-24 DIAGNOSIS — M35.2: ICD-10-CM

## 2025-01-24 DIAGNOSIS — M10.9 GOUTY ARTHRITIS: ICD-10-CM

## 2025-01-24 DIAGNOSIS — E78.2 MIXED HYPERLIPIDEMIA: ICD-10-CM

## 2025-01-24 PROCEDURE — 99214 OFFICE O/P EST MOD 30 MIN: CPT | Performed by: FAMILY MEDICINE

## 2025-01-24 NOTE — ASSESSMENT & PLAN NOTE
Blood work ordered patient has discontinued supplementation  Orders:  •  CBC; Future  •  Comprehensive metabolic panel; Future  •  Lipid Panel with Direct LDL reflex; Future  •  TSH, 3rd generation with Free T4 reflex; Future  •  UA (URINE) with reflex to Scope; Future  •  Vitamin D 25 hydroxy; Future  •  PSA, Total Screen; Future

## 2025-01-24 NOTE — ASSESSMENT & PLAN NOTE
Stable follows with rheumatology  Orders:  •  CBC; Future  •  Comprehensive metabolic panel; Future  •  Lipid Panel with Direct LDL reflex; Future  •  TSH, 3rd generation with Free T4 reflex; Future  •  UA (URINE) with reflex to Scope; Future  •  Vitamin D 25 hydroxy; Future  •  PSA, Total Screen; Future

## 2025-01-24 NOTE — PATIENT INSTRUCTIONS
Follow with physiatry, Dr. Sidney Rose, for evaluation of arm and foot pain  Follow with all specialist further instructions  Return in 6 months for office visit, annual exam, and blood work

## 2025-01-24 NOTE — ASSESSMENT & PLAN NOTE
Blood work ordered  Orders:  •  CBC; Future  •  Comprehensive metabolic panel; Future  •  Lipid Panel with Direct LDL reflex; Future  •  TSH, 3rd generation with Free T4 reflex; Future  •  UA (URINE) with reflex to Scope; Future  •  Vitamin D 25 hydroxy; Future  •  PSA, Total Screen; Future

## 2025-01-24 NOTE — PROGRESS NOTES
Name: Jose Miguel Clayton      : 1970      MRN: [id number]  Encounter Provider: James Escamilla DO  Encounter Date: 2025   Encounter department: [facility] PRIMARY CARE  1.  Right arm pain/right foot pain  I question neuropathic manifestation  Refer to physiatry for evaluation and EMG  2.  Bassette syndrome/gouty arthritis/seronegative inflammatory arthritis, stable follows with rheumatology  3.  Thrombocytosis blood work ordered #4.  Hyperlipidemia currently on atorvastatin 20, blood work ordered  5.  Vitamin D deficiency patient has discontinued supplementation blood work ordered  6.  PRID screening prostate cancer PSA ordered  7.  Return in 6 months for office visit, annual exam, and blood work  :  Assessment & Plan  Right arm pain  I favor neuropathic, will refer to physiatry evaluation and EMG  Orders:  •  Ambulatory Referral to Physiatry; Future  •  CBC; Future  •  Comprehensive metabolic panel; Future  •  Lipid Panel with Direct LDL reflex; Future  •  TSH, 3rd generation with Free T4 reflex; Future  •  UA (URINE) with reflex to Scope; Future  •  Vitamin D 25 hydroxy; Future  •  PSA, Total Screen; Future    Right foot pain  As above  Orders:  •  Ambulatory Referral to Physiatry; Future  •  CBC; Future  •  Comprehensive metabolic panel; Future  •  Lipid Panel with Direct LDL reflex; Future  •  TSH, 3rd generation with Free T4 reflex; Future  •  UA (URINE) with reflex to Scope; Future  •  Vitamin D 25 hydroxy; Future  •  PSA, Total Screen; Future    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)  Stable follows with rheumatology  Orders:  •  CBC; Future  •  Comprehensive metabolic panel; Future  •  Lipid Panel with Direct LDL reflex; Future  •  TSH, 3rd generation with Free T4 reflex; Future  •  UA (URINE) with reflex to Scope; Future  •  Vitamin D 25 hydroxy; Future  •  PSA, Total Screen; Future    Gouty arthritis  Stable follows with rheumatology  Orders:  •  CBC; Future  •   Comprehensive metabolic panel; Future  •  Lipid Panel with Direct LDL reflex; Future  •  TSH, 3rd generation with Free T4 reflex; Future  •  UA (URINE) with reflex to Scope; Future  •  Vitamin D 25 hydroxy; Future  •  PSA, Total Screen; Future    Seronegative inflammatory arthritis  Stable follows with rheumatology  Orders:  •  CBC; Future  •  Comprehensive metabolic panel; Future  •  Lipid Panel with Direct LDL reflex; Future  •  TSH, 3rd generation with Free T4 reflex; Future  •  UA (URINE) with reflex to Scope; Future  •  Vitamin D 25 hydroxy; Future  •  PSA, Total Screen; Future    Thrombocytosis  Blood work ordered  Orders:  •  CBC; Future  •  Comprehensive metabolic panel; Future  •  Lipid Panel with Direct LDL reflex; Future  •  TSH, 3rd generation with Free T4 reflex; Future  •  UA (URINE) with reflex to Scope; Future  •  Vitamin D 25 hydroxy; Future  •  PSA, Total Screen; Future    Mixed hyperlipidemia  On atorvastatin 20 mg, blood work ordered  Orders:  •  CBC; Future  •  Comprehensive metabolic panel; Future  •  Lipid Panel with Direct LDL reflex; Future  •  TSH, 3rd generation with Free T4 reflex; Future  •  UA (URINE) with reflex to Scope; Future  •  Vitamin D 25 hydroxy; Future  •  PSA, Total Screen; Future    Vitamin D deficiency  Blood work ordered patient has discontinued supplementation  Orders:  •  CBC; Future  •  Comprehensive metabolic panel; Future  •  Lipid Panel with Direct LDL reflex; Future  •  TSH, 3rd generation with Free T4 reflex; Future  •  UA (URINE) with reflex to Scope; Future  •  Vitamin D 25 hydroxy; Future  •  PSA, Total Screen; Future    Screening for prostate cancer  PSA ordered  Orders:  •  CBC; Future  •  Comprehensive metabolic panel; Future  •  Lipid Panel with Direct LDL reflex; Future  •  TSH, 3rd generation with Free T4 reflex; Future  •  UA (URINE) with reflex to Scope; Future  •  Vitamin D 25 hydroxy; Future  •  PSA, Total Screen; Future           History of Present  "Illness   Patient's had right arm pain and right foot pain off-and-on for years.  He would like this evaluated denies any paresthesias or weakness.  No history of trauma to the areas      Review of Systems   Constitutional: Negative.    HENT: Negative.     Eyes: Negative.    Respiratory: Negative.     Cardiovascular: Negative.    Gastrointestinal: Negative.    Endocrine: Negative.    Genitourinary: Negative.    Musculoskeletal:         HPI   Skin: Negative.    Allergic/Immunologic: Negative.    Neurological:         HPI   Hematological: Negative.    Psychiatric/Behavioral: Negative.         Objective   /86   Pulse 74   Ht 5' 11\" (1.803 m)   Wt 99.5 kg (219 lb 6.4 oz)   SpO2 97%   BMI 30.60 kg/m²      Physical Exam  Vitals and nursing note reviewed.   Constitutional:       Appearance: Normal appearance.   HENT:      Head: Normocephalic and atraumatic.      Mouth/Throat:      Mouth: Mucous membranes are moist.   Eyes:      General: No scleral icterus.  Neck:      Vascular: No carotid bruit.   Cardiovascular:      Rate and Rhythm: Normal rate and regular rhythm.      Pulses: Normal pulses.      Heart sounds: Normal heart sounds.   Pulmonary:      Effort: Pulmonary effort is normal.      Breath sounds: Normal breath sounds.   Musculoskeletal:         General: No tenderness or deformity.      Cervical back: Neck supple.      Right lower leg: No edema.      Left lower leg: No edema.      Comments: Bilateral upper extremities neurovasc intact negative tenderness negative Tinel's or reverse prayer sign  Right ankle negative Tinel's   Skin:     General: Skin is warm and dry.   Neurological:      General: No focal deficit present.      Mental Status: He is alert.      Motor: No weakness.   Psychiatric:         Mood and Affect: Mood normal.         "

## 2025-01-24 NOTE — ASSESSMENT & PLAN NOTE
On atorvastatin 20 mg, blood work ordered  Orders:  •  CBC; Future  •  Comprehensive metabolic panel; Future  •  Lipid Panel with Direct LDL reflex; Future  •  TSH, 3rd generation with Free T4 reflex; Future  •  UA (URINE) with reflex to Scope; Future  •  Vitamin D 25 hydroxy; Future  •  PSA, Total Screen; Future

## 2025-02-05 ENCOUNTER — OFFICE VISIT (OUTPATIENT)
Dept: FAMILY MEDICINE CLINIC | Facility: CLINIC | Age: 55
End: 2025-02-05
Payer: COMMERCIAL

## 2025-02-05 VITALS
WEIGHT: 214 LBS | BODY MASS INDEX: 29.96 KG/M2 | OXYGEN SATURATION: 98 % | TEMPERATURE: 97 F | HEIGHT: 71 IN | SYSTOLIC BLOOD PRESSURE: 128 MMHG | DIASTOLIC BLOOD PRESSURE: 84 MMHG | HEART RATE: 90 BPM

## 2025-02-05 DIAGNOSIS — R11.0 NAUSEA: Primary | ICD-10-CM

## 2025-02-05 DIAGNOSIS — R19.7 DIARRHEA, UNSPECIFIED TYPE: ICD-10-CM

## 2025-02-05 DIAGNOSIS — R10.9 ABDOMINAL CRAMPS: ICD-10-CM

## 2025-02-05 DIAGNOSIS — R14.0 ABDOMINAL BLOATING: ICD-10-CM

## 2025-02-05 DIAGNOSIS — A08.4 VIRAL GASTROENTERITIS: ICD-10-CM

## 2025-02-05 PROCEDURE — 99214 OFFICE O/P EST MOD 30 MIN: CPT | Performed by: FAMILY MEDICINE

## 2025-02-05 RX ORDER — ONDANSETRON 8 MG/1
8 TABLET, ORALLY DISINTEGRATING ORAL EVERY 8 HOURS PRN
Qty: 10 TABLET | Refills: 0 | Status: SHIPPED | OUTPATIENT
Start: 2025-02-05 | End: 2025-02-08

## 2025-02-05 RX ORDER — DIPHENOXYLATE HYDROCHLORIDE AND ATROPINE SULFATE 2.5; .025 MG/1; MG/1
1 TABLET ORAL 4 TIMES DAILY PRN
Qty: 10 TABLET | Refills: 0 | Status: SHIPPED | OUTPATIENT
Start: 2025-02-05 | End: 2025-02-12

## 2025-02-05 NOTE — PROGRESS NOTES
Name: Jose Miguel Clayton      : 1970      MRN: [id number]  Encounter Provider: James Escamilla DO  Encounter Date: 2025   Encounter department: [facility]  :  Assessment & Plan  Nausea  Zofran ordered  Code liquid diet advance as tolerated  Orders:  •  ondansetron (ZOFRAN-ODT) 8 mg disintegrating tablet; Take 1 tablet (8 mg total) by mouth every 8 (eight) hours as needed for nausea or vomiting for up to 3 days    Diarrhea, unspecified type  Lomotil till ordered  When able brat diet  Orders:  •  diphenoxylate-atropine (LOMOTIL) 2.5-0.025 mg per tablet; Take 1 tablet by mouth 4 (four) times a day as needed for diarrhea for up to 7 days    Abdominal bloating  Lomotil ordered  Orders:  •  diphenoxylate-atropine (LOMOTIL) 2.5-0.025 mg per tablet; Take 1 tablet by mouth 4 (four) times a day as needed for diarrhea for up to 7 days    Abdominal cramps  Lomotil ordered  Orders:  •  diphenoxylate-atropine (LOMOTIL) 2.5-0.025 mg per tablet; Take 1 tablet by mouth 4 (four) times a day as needed for diarrhea for up to 7 days    Viral gastroenteritis  Zofran/Lomotil as above  For liquid diet advance as tolerated  Return in 1 week if still with symptoms  If develop acute abdominal pain report to ER  Orders:  •  diphenoxylate-atropine (LOMOTIL) 2.5-0.025 mg per tablet; Take 1 tablet by mouth 4 (four) times a day as needed for diarrhea for up to 7 days  •  ondansetron (ZOFRAN-ODT) 8 mg disintegrating tablet; Take 1 tablet (8 mg total) by mouth every 8 (eight) hours as needed for nausea or vomiting for up to 3 days           History of Present Illness   For the past 4 to 5 days patient had nausea no vomiting patient's had abdominal bloating and cramping with diarrhea no blood or mucus no acute abdominal pain no fever chills.  No travel history no raw seafood etc.      Review of Systems   Constitutional:  Positive for fatigue. Negative for chills and fever.   HENT: Negative.     Eyes: Negative.   "  Respiratory: Negative.     Cardiovascular: Negative.    Gastrointestinal:         HPI   Endocrine: Negative.    Genitourinary: Negative.    Musculoskeletal: Negative.    Skin: Negative.    Allergic/Immunologic: Negative.    Neurological: Negative.    Hematological: Negative.    Psychiatric/Behavioral: Negative.         Objective   /84 (BP Location: Right arm, Patient Position: Sitting, Cuff Size: Large)   Pulse 90   Temp (!) 97 °F (36.1 °C) (Tympanic)   Ht 5' 11\" (1.803 m)   Wt 97.1 kg (214 lb)   SpO2 98%   BMI 29.85 kg/m²      Physical Exam  Vitals and nursing note reviewed.   Constitutional:       General: He is not in acute distress.     Appearance: Normal appearance. He is not ill-appearing, toxic-appearing or diaphoretic.   HENT:      Head: Normocephalic and atraumatic.      Mouth/Throat:      Mouth: Mucous membranes are moist.   Eyes:      General: No scleral icterus.  Cardiovascular:      Rate and Rhythm: Normal rate and regular rhythm.      Heart sounds: Normal heart sounds.   Pulmonary:      Effort: Pulmonary effort is normal.      Breath sounds: Normal breath sounds.   Abdominal:      General: Bowel sounds are normal. There is no distension.      Palpations: Abdomen is soft. There is no mass.      Tenderness: There is no abdominal tenderness. There is no right CVA tenderness, left CVA tenderness, guarding or rebound.      Hernia: No hernia is present.   Musculoskeletal:      Cervical back: Neck supple. No rigidity.      Right lower leg: No edema.      Left lower leg: No edema.   Skin:     General: Skin is warm and dry.   Neurological:      General: No focal deficit present.      Mental Status: He is alert.   Psychiatric:         Mood and Affect: Mood normal.         "

## 2025-02-05 NOTE — PATIENT INSTRUCTIONS
With clear liquid diet for 24 hours advance as tolerated up to the brat diet  Use Zofran as needed for nausea  Use Lomotil as needed for diarrhea abdominal bloating or cramping  Return in 1 week if still with symptoms  If develop acute abdominal pain go to ER

## 2025-02-06 ENCOUNTER — OFFICE VISIT (OUTPATIENT)
Dept: NEUROLOGY | Facility: CLINIC | Age: 55
End: 2025-02-06
Payer: COMMERCIAL

## 2025-02-06 VITALS
DIASTOLIC BLOOD PRESSURE: 80 MMHG | HEIGHT: 71 IN | HEART RATE: 80 BPM | WEIGHT: 217 LBS | SYSTOLIC BLOOD PRESSURE: 120 MMHG | BODY MASS INDEX: 30.38 KG/M2

## 2025-02-06 DIAGNOSIS — M35.2 BEHCET'S DISEASE (HCC): ICD-10-CM

## 2025-02-06 DIAGNOSIS — E66.9 OBESITY: ICD-10-CM

## 2025-02-06 DIAGNOSIS — M54.16 RADICULOPATHY, LUMBAR REGION: ICD-10-CM

## 2025-02-06 DIAGNOSIS — R20.2 PARESTHESIA: Primary | ICD-10-CM

## 2025-02-06 DIAGNOSIS — K22.70 BARRETT ESOPHAGUS: ICD-10-CM

## 2025-02-06 PROCEDURE — 99205 OFFICE O/P NEW HI 60 MIN: CPT | Performed by: PSYCHIATRY & NEUROLOGY

## 2025-02-06 NOTE — PROGRESS NOTES
Name: Jose Miguel Clayton      : 1970      MRN: [id number]  Encounter Provider: Poli Mai DO  Encounter Date: 2025   Encounter department: [facility]  :  Assessment & Plan  Paresthesia  54M with PMH of seronegative Behcet's and rheumatoid arthritis, lumbar radiculopathy, esophagus, and obesity presenting with gradually progressive RUE/RLE sensory disturbances described as circumferential pressure sensation extending from the elbow to the fingertips and knee to the toes. Episodes occur intermittently with increase in frequency and duration now occurring almost daily and last up to 6 hours. Also with new onset RLE calf twitching and bilateral lower extremity muscle cramping.    Physical exam today reassuring with no evidence of sensorimotor deficit with 5/5 strength throughout recommend intact sensation face. Remainder of the neurological examination with no evidence of muscle atrophy, abnormal tone, fasciculations or cranial nerves deficits.    Impression:Clinical symptomatology and subjective sensorimotor dysfunction does not follow a clear dermatomal/myotomal distribution does not clearly verbalize to the specific site along the neural axis. At this point in time clinical suspicion for a neuromuscular disease process. However given background of longstanding history of chronic rheumatological disease cannot fully rule out neurological cause at this time. Will obtain additional lab work to head to pending labs from rheumatology as well as obtain further diagnostic imaging with EMG and cervical spine MRI. If above workup remains unremarkable patient continues to have symptoms and signs can consider further workup with EEG and MRI brain.     Plan:  Discussed differential diagnosis and plan with patient who verbalized understanding is in agreement  Diagnostic studies  EMG 1 upper/1 lower of the right side  MRI cervical C-spine with and without contrast  Lab  studies: Vitamin B12, B6, SPEP, folate, CK, CMP  Continue following closely with rheumatology for ongoing management and treatment of seronegative patients and rheumatoid arthritis  Follow-up in 4 months    Orders:  •  EMG 1 Upper/1 Lower Neuropathy; Future  •  EMG 1 Upper/1 Lower Neuropathy; Future  •  Protein electrophoresis, serum; Future  •  Vitamin B6; Future  •  Vitamin B12; Future  •  Folate; Future  •  CK; Future  •  MRI cervical spine with and without contrast; Future  •  Comprehensive metabolic panel; Future    Radiculopathy, lumbar region  History of chronic lower back pain with noted worsening in the fall 2024 in which she had MR imaging of the L-spine revealing evidence of mild interval increase in the paracentral/subarticular disc protrusion at L5-S1 with transverse contact of the L S1 nerve root  Seen pain management for trigger point injections         Brennan esophagus  Longstanding history of several years of difficulty tolerating liquids/solids with occasional episodes of dysphagia. Multiple EGDs performed in the past and follows with GI for interval endoscopic evaluations every 2 years. Found to have Brennan's esophagus. Status post Nissen fundoplication. Denies worsening symptoms.       Behcet's disease (HCC)  Reported history of seronegative Behcet's disease based off clinical history of recurrent oral ulcers and dermatological manifestations. Follows with rheumatology in which she is currently being treated with sulfasalazine. Also states that he was previously diagnosed with seronegative RA. Has had extensive rheumatological serologies/diagnostic workup which all has been unremarkable.           History of Present Illness {?Quick Links Encounters * My Last Note * Last Note in Specialty * Snapshot * Since Last Visit * History :05292}  54-year-old male with past medical history for seronegative Behcet's as well as rheumatoid arthritis, Brennan's esophagus, anxiety, and obesity who presents for  the first time to the outpatient neurology clinic for evaluation for right-sided sensorimotor dysfunction.    Patient reports that over the last 6 months he developed new onset gradually progressive sensory disturbances described as a pressure sensation in which he feels as if a 10 pound weight is pressing against his upper and lower extremities only on the right side. He reports that these episodes are intermittent with noted increase in frequency since onset now occurring almost daily with increase in length of duration noting presence can occur up to several hours. This sensation is localized to the right arm and leg and distributed circumferentially around the extremities from the elbow to the fingertips and the knee to toes. Also at times endorses subjective mild weakness to do these extremities. Has not identified any clear triggering or provocative features. Can occur at any part of the day and has not correlated with any certain activities. The arm and leg symptoms always occur in conjunction with each other and never isolated. States that onset as well as cessation happens simultaneously.     States that during these episodes he has no alterations in his level of consciousness or awareness. Not associated with LOC or confusion. Following these episodes he has not appreciated lethargy or disorientation. Denies any involuntary motor movements.    He also notes associated intermittent episodes of bilateral lower extremity muscle cramping as well as new right calf muscle twitching. States that there may have been some change in his voice with possible decrease in vocal production states the cramping and twitching is usually appreciated while at rest.  Denies the sensation of overt numbness or tingling. No new or additional neurological sequelae outside from the after mentioned symptoms. No neck pain or exacerbation of chronic lower back pain. Denies any significant trauma or recent falls.    Does have  "longstanding history of chronic somatic complaints. States that \"ever since he was young\" he has had difficulty with occasional episodes of choking/difficulties with eating/drinking. Has had multiple EGDs in the past diagnosed with Brennan's.     Longstanding daily fatigued over the last 20+ years which he states that he feels as if he is \"hung over\". Present upon wakening. Notes exacerbated with exertional/physical activity. Still able to ride his bike few miles without any significant physical fatigue. Evaluated by rheumatology and prior provider informed him that he has seronegative Behcet's as well as rheumatoid arthritis even long history of recurrent oral ulcers and dermatological symptoms. Currently on sulfasalazine for the treatment of Behcet's.      Patient states over the last 6 months he developed new onset right upper extremity/right lower extremity sensory disturbances in which he describes the sensation of feel a \"10 lb weight\" that is located circumferentially from the elbow to the fingertips and knee to his toes. Since onset both frequency and duration has gradually progressed and currently can last up to several hrs and occurring daily. Denies any identifiable trigger/provoking factors.  Escribed as a burning sensati  54-year-old male with a past medical history significant for      Review of Systems   Musculoskeletal:  Negative for neck pain and neck stiffness.        Muscle twitching, cramping   Psychiatric/Behavioral:  Negative for agitation and behavioral problems.    All other systems reviewed and are negative.   I have personally reviewed the MA's review of systems and made changes as necessary.    Medical History Reviewed by provider this encounter:     .  Past Medical History   Past Medical History:   Diagnosis Date   • Anxiety    • Arthritis    • Brennan esophagus 08/07/2013    Diagnosed 2013 with EGD biopsy, Dr Agarwal.  Biopsies confirmed again in 2014 in 2015. Dr. Agarwal, EGD 12/12/2017 " Positive intestinal metaplasia without dysplasia. EGD 2020 long segment Brennan's esophagus, biopsy negative.    • COVID-19    • COVID-19 virus infection 12/16/2020    15Dec: poitive test.16Dec:  Day 4. Some issues with SOB and cough. 18Dec: Day 6: Feeling better overall. Increased cough. 21Dec: Day 9: First symptoms 11Dec. Continues with minor symptoms.  22Dec: Day 10. Has imporved a bit. Add Phenergan at HS. 12/23/2020.  Released from isolation see note 12/28: Still reporting lingering symptoms of COVID.   • GERD (gastroesophageal reflux disease) 08/30/2012    Description: s/p Carlos fundoplication, Dr. Valadez   • Herpes simplex type 1 infection 06/18/2014   • Hyperlipidemia 08/30/2012   • Lymphadenopathy 08/30/2012     Benign Cervical per Dr. Tomy Yeager   • Vitamin D deficiency 05/06/2017     Past Surgical History:   Procedure Laterality Date   • DEEP NECK LYMPH NODE BIOPSY / EXCISION      cervical node biopsy with dissection of jugular nodes   • EGD  01/2020    Dr Agarwal, [facility].Nissen fundoplication evidencing gastric fundus without abnormality, Long segment Brennan's esophagus without dysplasia, bx negative.    • EGD  11/10/2021    4 cm Brennan's esophagus biopsy positive for intestinal metaplasia and no dysplasia, biopsies stomach negative for H. pylori, Nissen intact by Dr. Agarwal   • ESOPHAGOGASTRIC FUNDOPLASTY  03/2016    Nissen Fundoplication, Dr Valadez      Family History   Problem Relation Age of Onset   • Heart attack Father         Anteroapical MI   • Heart disease Father    • Leukemia Brother       reports that he has never smoked. He has never used smokeless tobacco. He reports current alcohol use of about 1.0 - 2.0 standard drink of alcohol per week. He reports that he does not currently use drugs.  Current Outpatient Medications on File Prior to Visit   Medication Sig Dispense Refill   • atorvastatin (LIPITOR) 20 mg tablet Take 1 tablet (20 mg total) by mouth daily 90 tablet 3   •  diphenoxylate-atropine (LOMOTIL) 2.5-0.025 mg per tablet Take 1 tablet by mouth 4 (four) times a day as needed for diarrhea for up to 7 days 10 tablet 0   • ondansetron (ZOFRAN-ODT) 8 mg disintegrating tablet Take 1 tablet (8 mg total) by mouth every 8 (eight) hours as needed for nausea or vomiting for up to 3 days 10 tablet 0   • sulfaSALAzine (AZULFIDINE) 500 mg tablet Take 1 tablet (500 mg total) by mouth 2 (two) times a day 60 tablet 6     No current facility-administered medications on file prior to visit.     Allergies   Allergen Reactions   • Azithromycin GI Intolerance     Category: Adverse Reaction;    • Penicillins      Other reaction(s): Other (See Comments)      Current Outpatient Medications on File Prior to Visit   Medication Sig Dispense Refill   • atorvastatin (LIPITOR) 20 mg tablet Take 1 tablet (20 mg total) by mouth daily 90 tablet 3   • diphenoxylate-atropine (LOMOTIL) 2.5-0.025 mg per tablet Take 1 tablet by mouth 4 (four) times a day as needed for diarrhea for up to 7 days 10 tablet 0   • ondansetron (ZOFRAN-ODT) 8 mg disintegrating tablet Take 1 tablet (8 mg total) by mouth every 8 (eight) hours as needed for nausea or vomiting for up to 3 days 10 tablet 0   • sulfaSALAzine (AZULFIDINE) 500 mg tablet Take 1 tablet (500 mg total) by mouth 2 (two) times a day 60 tablet 6     No current facility-administered medications on file prior to visit.      Social History     Tobacco Use   • Smoking status: Never   • Smokeless tobacco: Never   Vaping Use   • Vaping status: Never Used   Substance and Sexual Activity   • Alcohol use: Yes     Alcohol/week: 1.0 - 2.0 standard drink of alcohol     Types: 1 - 2 Standard drinks or equivalent per week     Comment: socially   • Drug use: Not Currently   • Sexual activity: Not on file        Objective {?Quick Links Trend Vitals * Enter New Vitals * Results Review * Timeline (Adult) * Labs * Imaging * Cardiology * Procedures * Lung Cancer Screening * Surgical  "eConsent :96138}  /80 (BP Location: Left arm, Patient Position: Sitting, Cuff Size: Adult)   Pulse 80   Ht 5' 11\" (1.803 m)   Wt 98.4 kg (217 lb)   BMI 30.27 kg/m²     Physical Exam  Vitals reviewed.   Constitutional:       Appearance: Normal appearance. He is obese.   HENT:      Head: Normocephalic and atraumatic.      Nose: Nose normal.      Mouth/Throat:      Mouth: Mucous membranes are dry.      Comments: White oral thrush tongue  Eyes:      General: Lids are normal.      Extraocular Movements: Extraocular movements intact.      Pupils: Pupils are equal, round, and reactive to light.   Cardiovascular:      Rate and Rhythm: Normal rate.   Pulmonary:      Effort: Pulmonary effort is normal. No respiratory distress.   Musculoskeletal:         General: No deformity. Normal range of motion.      Cervical back: Normal range of motion and neck supple. No rigidity or tenderness.   Skin:     General: Skin is warm.   Neurological:      Mental Status: He is alert.   Psychiatric:         Mood and Affect: Mood normal.         Speech: Speech normal.         Behavior: Behavior normal.       Neurological Exam  Mental Status  Alert. Oriented to person, place, time and situation. Recent and remote memory are intact. Speech is normal. Language is fluent with no aphasia. Attention and concentration are normal.    Cranial Nerves  CN II: Visual fields full to confrontation.  CN III, IV, VI: Extraocular movements intact bilaterally. Normal lids and orbits bilaterally. Pupils equal round and reactive to light bilaterally.  CN V: Facial sensation is normal.  CN VII: Full and symmetric facial movement. Bilateral ptosis of the upper lids.  CN VIII: Hearing is normal.  CN IX, X: Palate elevates symmetrically  CN XI: Shoulder shrug strength is normal.  CN XII: Tongue midline without atrophy or fasciculations.    Motor  Normal muscle bulk throughout. No fasciculations present. Normal muscle tone. No abnormal involuntary movements. " Strength is 5/5 in all four extremities except as noted.    Sensory  Light touch is normal in upper and lower extremities. Pinprick is normal in upper and lower extremities. Temperature is normal in upper and lower extremities. Vibration is normal in upper and lower extremities. Proprioception is normal in upper and lower extremities. Right hemisensory loss.   No sensory level.    Reflexes  Deep tendon reflexes are 2+ and symmetric except as noted.    Right pathological reflexes: Crossed adductor absent. Ankle clonus absent.  Left pathological reflexes: Crossed adductor absent. Ankle clonus absent.  DTRs 2+ throughout and symmetric.    Coordination  Right: Finger-to-nose normal. Rapid alternating movement normal. Heel-to-shin normal.Left: Finger-to-nose normal. Rapid alternating movement normal. Heel-to-shin normal.    Gait  Normal casual, toe, heel and tandem gait.  Unable to be assessed due to the patient's current medical status.      Radiology Results Review: I have reviewed the following images/report studies in PACS: No results found.   Prior labs:    Lipid Panel: mg/dL  TRIG 141 mg/dL HDL 60 mg/dL LDL 99 mg/dL (10/2/2024)  HgbA1c: 5.5 % (12/16/2022)  TSH: 1.792 uIU/mL (10/2/2024)  Coags: PT 12.8 seconds (6/24/2023), INR 0.96 (6/24/2023), PTT No results in last 5 years (No results in last 5 years)    BANG: Negative (12/9/2024)  RF: Negative (12/9/2024)  CCP Antibody, IgG (<7 u/ml): 1.0 (12/9/2024)  Sjogren's: SS-A (RO) Ab (0.0 - 0.9): <0.2 AI, SS-B (LA) Ab (0.0 - 0.9): <0.2 AI (1/15/2021)  CRP: 2.7 mg/L (12/9/2024)  ESR: 5 mm/hour (8/22/2024)\  Administrative Statements {?Quick Links Full Problem List * Level of Service * [facility]:25920}

## 2025-02-06 NOTE — ASSESSMENT & PLAN NOTE
Longstanding history of several years of difficulty tolerating liquids/solids with occasional episodes of dysphagia. Multiple EGDs performed in the past and follows with GI for interval endoscopic evaluations every 2 years. Found to have Brennan's esophagus. Status post Nissen fundoplication. Denies worsening symptoms.

## 2025-02-06 NOTE — ASSESSMENT & PLAN NOTE
54M with PMH of seronegative Behcet's and rheumatoid arthritis, lumbar radiculopathy, esophagus, and obesity presenting with gradually progressive RUE/RLE sensory disturbances described as circumferential pressure sensation extending from the elbow to the fingertips and knee to the toes. Episodes occur intermittently with increase in frequency and duration now occurring almost daily and last up to 6 hours. Also with new onset RLE calf twitching and bilateral lower extremity muscle cramping.    Physical exam today reassuring with no evidence of sensorimotor deficit with 5/5 strength throughout recommend intact sensation face. Remainder of the neurological examination with no evidence of muscle atrophy, abnormal tone, fasciculations or cranial nerves deficits.    Impression:Clinical symptomatology and subjective sensorimotor dysfunction does not follow a clear dermatomal/myotomal distribution does not clearly verbalize to the specific site along the neural axis. At this point in time clinical suspicion for a neuromuscular disease process. However given background of longstanding history of chronic rheumatological disease cannot fully rule out neurological cause at this time. Will obtain additional lab work to head to pending labs from rheumatology as well as obtain further diagnostic imaging with EMG and cervical spine MRI. If above workup remains unremarkable patient continues to have symptoms and signs can consider further workup with EEG and MRI brain.     Plan:  Discussed differential diagnosis and plan with patient who verbalized understanding is in agreement  Diagnostic studies  EMG 1 upper/1 lower of the right side  MRI cervical C-spine with and without contrast  Lab studies: Vitamin B12, B6, SPEP, folate, CK, CMP  Continue following closely with rheumatology for ongoing management and treatment of seronegative patients and rheumatoid arthritis  Follow-up in 4 months    Orders:  •  EMG 1 Upper/1 Lower  Neuropathy; Future  •  EMG 1 Upper/1 Lower Neuropathy; Future  •  Protein electrophoresis, serum; Future  •  Vitamin B6; Future  •  Vitamin B12; Future  •  Folate; Future  •  CK; Future  •  MRI cervical spine with and without contrast; Future  •  Comprehensive metabolic panel; Future

## 2025-02-10 ENCOUNTER — APPOINTMENT (OUTPATIENT)
Dept: LAB | Facility: CLINIC | Age: 55
End: 2025-02-10
Payer: COMMERCIAL

## 2025-02-10 DIAGNOSIS — M35.2: ICD-10-CM

## 2025-02-10 DIAGNOSIS — M13.80 SERONEGATIVE INFLAMMATORY ARTHRITIS: ICD-10-CM

## 2025-02-10 DIAGNOSIS — M10.9 GOUTY ARTHRITIS: ICD-10-CM

## 2025-02-10 DIAGNOSIS — M51.26 LUMBAR DISC HERNIATION: ICD-10-CM

## 2025-02-10 DIAGNOSIS — R20.2 PARESTHESIA: ICD-10-CM

## 2025-02-10 LAB
FOLATE SERPL-MCNC: >22.3 NG/ML
VIT B12 SERPL-MCNC: 180 PG/ML (ref 180–914)

## 2025-02-10 PROCEDURE — 36415 COLL VENOUS BLD VENIPUNCTURE: CPT

## 2025-02-10 PROCEDURE — 84165 PROTEIN E-PHORESIS SERUM: CPT

## 2025-02-10 PROCEDURE — 82746 ASSAY OF FOLIC ACID SERUM: CPT

## 2025-02-10 PROCEDURE — 86334 IMMUNOFIX E-PHORESIS SERUM: CPT

## 2025-02-10 PROCEDURE — 82607 VITAMIN B-12: CPT

## 2025-02-10 PROCEDURE — 84207 ASSAY OF VITAMIN B-6: CPT

## 2025-02-10 PROCEDURE — 82550 ASSAY OF CK (CPK): CPT

## 2025-02-10 PROCEDURE — 80053 COMPREHEN METABOLIC PANEL: CPT

## 2025-02-10 PROCEDURE — 86140 C-REACTIVE PROTEIN: CPT

## 2025-02-11 LAB
ALBUMIN SERPL BCG-MCNC: 4.3 G/DL (ref 3.5–5)
ALP SERPL-CCNC: 74 U/L (ref 34–104)
ALT SERPL W P-5'-P-CCNC: 30 U/L (ref 7–52)
ANION GAP SERPL CALCULATED.3IONS-SCNC: 12 MMOL/L (ref 4–13)
AST SERPL W P-5'-P-CCNC: 28 U/L (ref 13–39)
BILIRUB SERPL-MCNC: 0.62 MG/DL (ref 0.2–1)
BUN SERPL-MCNC: 10 MG/DL (ref 5–25)
CALCIUM SERPL-MCNC: 9.2 MG/DL (ref 8.4–10.2)
CHLORIDE SERPL-SCNC: 105 MMOL/L (ref 96–108)
CK SERPL-CCNC: 76 U/L (ref 39–308)
CO2 SERPL-SCNC: 25 MMOL/L (ref 21–32)
CREAT SERPL-MCNC: 0.95 MG/DL (ref 0.6–1.3)
CRP SERPL QL: 3.1 MG/L
GFR SERPL CREATININE-BSD FRML MDRD: 90 ML/MIN/1.73SQ M
GLUCOSE SERPL-MCNC: 92 MG/DL (ref 65–140)
POTASSIUM SERPL-SCNC: 4.2 MMOL/L (ref 3.5–5.3)
PROT SERPL-MCNC: 6.8 G/DL (ref 6.4–8.4)
SODIUM SERPL-SCNC: 142 MMOL/L (ref 135–147)

## 2025-02-13 ENCOUNTER — TELEPHONE (OUTPATIENT)
Age: 55
End: 2025-02-13

## 2025-02-13 ENCOUNTER — RESULTS FOLLOW-UP (OUTPATIENT)
Dept: FAMILY MEDICINE CLINIC | Facility: CLINIC | Age: 55
End: 2025-02-13

## 2025-02-13 LAB
ALBUMIN SERPL ELPH-MCNC: 4.06 G/DL (ref 3.2–5.1)
ALBUMIN SERPL ELPH-MCNC: 61.5 % (ref 48–70)
ALPHA1 GLOB SERPL ELPH-MCNC: 0.21 G/DL (ref 0.15–0.47)
ALPHA1 GLOB SERPL ELPH-MCNC: 3.2 % (ref 1.8–7)
ALPHA2 GLOB SERPL ELPH-MCNC: 0.51 G/DL (ref 0.42–1.04)
ALPHA2 GLOB SERPL ELPH-MCNC: 7.8 % (ref 5.9–14.9)
BETA GLOB ABNORMAL SERPL ELPH-MCNC: 0.49 G/DL (ref 0.31–0.57)
BETA1 GLOB SERPL ELPH-MCNC: 7.4 % (ref 4.7–7.7)
BETA2 GLOB SERPL ELPH-MCNC: 7.8 % (ref 3.1–7.9)
BETA2+GAMMA GLOB SERPL ELPH-MCNC: 0.51 G/DL (ref 0.2–0.58)
GAMMA GLOB ABNORMAL SERPL ELPH-MCNC: 0.81 G/DL (ref 0.4–1.66)
GAMMA GLOB SERPL ELPH-MCNC: 12.3 % (ref 6.9–22.3)
IGG/ALB SER: 1.6 {RATIO} (ref 1.1–1.8)
INTERPRETATION UR IFE-IMP: NORMAL
PROT PATTERN SERPL ELPH-IMP: NORMAL
PROT SERPL-MCNC: 6.6 G/DL (ref 6.4–8.2)

## 2025-02-13 PROCEDURE — 84165 PROTEIN E-PHORESIS SERUM: CPT | Performed by: STUDENT IN AN ORGANIZED HEALTH CARE EDUCATION/TRAINING PROGRAM

## 2025-02-13 PROCEDURE — 86334 IMMUNOFIX E-PHORESIS SERUM: CPT | Performed by: STUDENT IN AN ORGANIZED HEALTH CARE EDUCATION/TRAINING PROGRAM

## 2025-02-14 LAB — VIT B6 SERPL-MCNC: 9.1 UG/L (ref 3.4–65.2)

## 2025-02-21 NOTE — TELEPHONE ENCOUNTER
Patient called again for lab results, available for review, encounter dated 2/13.  Please review and provide input, thank you.

## 2025-03-03 DIAGNOSIS — R20.2 PARESTHESIA: Primary | ICD-10-CM

## 2025-03-03 RX ORDER — CYANOCOBALAMIN 1000 UG/ML
INJECTION, SOLUTION INTRAMUSCULAR; SUBCUTANEOUS
Qty: 9 ML | Refills: 0 | Status: SHIPPED | OUTPATIENT
Start: 2025-03-03 | End: 2025-03-11

## 2025-03-04 ENCOUNTER — HOSPITAL ENCOUNTER (OUTPATIENT)
Dept: MRI IMAGING | Facility: HOSPITAL | Age: 55
Discharge: HOME | End: 2025-03-04
Payer: COMMERCIAL

## 2025-03-04 DIAGNOSIS — R20.2 PARESTHESIA: ICD-10-CM

## 2025-03-04 PROCEDURE — A9585 GADOBUTROL INJECTION: HCPCS

## 2025-03-04 PROCEDURE — 72156 MRI NECK SPINE W/O & W/DYE: CPT

## 2025-03-04 RX ORDER — GADOBUTROL 604.72 MG/ML
10 INJECTION INTRAVENOUS
Status: COMPLETED | OUTPATIENT
Start: 2025-03-04 | End: 2025-03-04

## 2025-03-04 RX ADMIN — GADOBUTROL 10 ML: 604.72 INJECTION INTRAVENOUS at 21:23

## 2025-03-11 ENCOUNTER — DOCUMENTATION (OUTPATIENT)
Dept: OTHER | Facility: HOSPITAL | Age: 55
End: 2025-03-11

## 2025-03-11 ENCOUNTER — TELEPHONE (OUTPATIENT)
Dept: NEUROLOGY | Facility: CLINIC | Age: 55
End: 2025-03-11

## 2025-03-11 DIAGNOSIS — E53.8 DEFICIENCY OF VITAMIN B12: Primary | ICD-10-CM

## 2025-03-11 DIAGNOSIS — R20.2 PARESTHESIA: ICD-10-CM

## 2025-03-11 RX ORDER — CYANOCOBALAMIN 1000 UG/ML
1000 INJECTION, SOLUTION INTRAMUSCULAR; SUBCUTANEOUS ONCE
Status: COMPLETED | OUTPATIENT
Start: 2025-03-11 | End: 2025-04-08

## 2025-03-11 RX ORDER — CYANOCOBALAMIN 1000 UG/ML
1000 INJECTION, SOLUTION INTRAMUSCULAR; SUBCUTANEOUS WEEKLY
Status: COMPLETED | OUTPATIENT
Start: 2025-03-11 | End: 2025-04-01

## 2025-03-11 NOTE — TELEPHONE ENCOUNTER
Pt presented to [place] office for bit b12 injection.  Injection orders went to [facility] pharmacy.  Office contacted belle at [place].  I wrote order in doctors absence.     Poli and Dr Esposito please follow up with pt and correct future orders.

## 2025-03-12 ENCOUNTER — CLINICAL SUPPORT (OUTPATIENT)
Dept: NEUROLOGY | Facility: CLINIC | Age: 55
End: 2025-03-12
Payer: COMMERCIAL

## 2025-03-12 ENCOUNTER — TELEPHONE (OUTPATIENT)
Age: 55
End: 2025-03-12

## 2025-03-12 DIAGNOSIS — M51.26 LUMBAR DISC HERNIATION: Primary | ICD-10-CM

## 2025-03-12 DIAGNOSIS — G89.29 CHRONIC MIDLINE LOW BACK PAIN WITHOUT SCIATICA: ICD-10-CM

## 2025-03-12 DIAGNOSIS — M54.50 CHRONIC MIDLINE LOW BACK PAIN WITHOUT SCIATICA: ICD-10-CM

## 2025-03-12 DIAGNOSIS — E53.8 B12 DEFICIENCY: Primary | ICD-10-CM

## 2025-03-12 PROCEDURE — 96372 THER/PROPH/DIAG INJ SC/IM: CPT | Performed by: NURSE PRACTITIONER

## 2025-03-12 RX ADMIN — CYANOCOBALAMIN 1000 MCG: 1000 INJECTION, SOLUTION INTRAMUSCULAR; SUBCUTANEOUS at 10:33

## 2025-03-12 NOTE — TELEPHONE ENCOUNTER
Patient stated he's been going to [facility] for back issues but would like to switch to an in-network provider with [facility]'s. He would like to know if Dr Escamilla could give him a referral to Orthopedics or if he needs to schedule an appointment first. Patient would like a call back; [phone].

## 2025-03-18 ENCOUNTER — CLINICAL SUPPORT (OUTPATIENT)
Dept: NEUROLOGY | Facility: CLINIC | Age: 55
End: 2025-03-18
Payer: COMMERCIAL

## 2025-03-18 DIAGNOSIS — E53.8 B12 DEFICIENCY: Primary | ICD-10-CM

## 2025-03-18 PROCEDURE — 96372 THER/PROPH/DIAG INJ SC/IM: CPT | Performed by: PSYCHIATRY & NEUROLOGY

## 2025-03-18 RX ADMIN — CYANOCOBALAMIN 1000 MCG: 1000 INJECTION, SOLUTION INTRAMUSCULAR; SUBCUTANEOUS at 09:10

## 2025-03-18 NOTE — PROGRESS NOTES
Pt here for vitamin b 12 inj #2, given IM in L deltoid, tolerated well without complications, has injection scheduled for next week.

## 2025-03-21 ENCOUNTER — TELEPHONE (OUTPATIENT)
Dept: OBGYN CLINIC | Facility: MEDICAL CENTER | Age: 55
End: 2025-03-21

## 2025-03-21 NOTE — TELEPHONE ENCOUNTER
Caller: Luis    Doctor: Dr. Gomes     Reason for call: Patient is coming in on 3/25 and he is having a difficult time obtaining his MRI results on disc from [facility].     He also has MRI's at [facility].     Call back#: [phone]

## 2025-03-25 ENCOUNTER — CLINICAL SUPPORT (OUTPATIENT)
Dept: NEUROLOGY | Facility: CLINIC | Age: 55
End: 2025-03-25
Payer: COMMERCIAL

## 2025-03-25 ENCOUNTER — OFFICE VISIT (OUTPATIENT)
Dept: OBGYN CLINIC | Facility: HOSPITAL | Age: 55
End: 2025-03-25
Payer: COMMERCIAL

## 2025-03-25 ENCOUNTER — HOSPITAL ENCOUNTER (OUTPATIENT)
Dept: RADIOLOGY | Facility: HOSPITAL | Age: 55
Discharge: HOME | End: 2025-03-25
Attending: ORTHOPAEDIC SURGERY
Payer: COMMERCIAL

## 2025-03-25 VITALS — HEIGHT: 71 IN | BODY MASS INDEX: 30.38 KG/M2 | WEIGHT: 217 LBS

## 2025-03-25 DIAGNOSIS — M54.50 CHRONIC MIDLINE LOW BACK PAIN WITHOUT SCIATICA: ICD-10-CM

## 2025-03-25 DIAGNOSIS — M54.16 LUMBAR RADICULOPATHY: Primary | ICD-10-CM

## 2025-03-25 DIAGNOSIS — E53.8 B12 DEFICIENCY: Primary | ICD-10-CM

## 2025-03-25 DIAGNOSIS — M54.12 CERVICAL RADICULOPATHY: ICD-10-CM

## 2025-03-25 DIAGNOSIS — G89.29 CHRONIC MIDLINE LOW BACK PAIN WITHOUT SCIATICA: ICD-10-CM

## 2025-03-25 DIAGNOSIS — M51.26 LUMBAR DISC HERNIATION: ICD-10-CM

## 2025-03-25 PROCEDURE — 72110 X-RAY EXAM L-2 SPINE 4/>VWS: CPT

## 2025-03-25 PROCEDURE — 96372 THER/PROPH/DIAG INJ SC/IM: CPT | Performed by: PSYCHIATRY & NEUROLOGY

## 2025-03-25 PROCEDURE — 99203 OFFICE O/P NEW LOW 30 MIN: CPT | Performed by: ORTHOPAEDIC SURGERY

## 2025-03-25 RX ADMIN — CYANOCOBALAMIN 1000 MCG: 1000 INJECTION, SOLUTION INTRAMUSCULAR; SUBCUTANEOUS at 08:58

## 2025-03-25 NOTE — LETTER
2025     James Escamilla DO  [address]  [address]  [place] [facility] [address]    Patient: Jose Miguel Clayton   YOB: 1970   Date of Visit: 3/25/2025       Dear Dr. Escamilla:    Thank you for referring Jose Miguel Clayton to me for evaluation. Below are my notes for this consultation.    If you have questions, please do not hesitate to call me. I look forward to following your patient along with you.         Sincerely,        Kd Gomes MD        CC: No Recipients    Kd Gomes MD  3/28/2025  7:49 PM  Sign when Signing Visit  Name: Jose Miguel Clayton      : 1970       MRN: [id number]   Encounter Provider: Kd Gomes MD   Encounter Date: 25  Encounter department: [facility] ORTHOPEDIC CARE SPECIALISTS [facility] ORTHOPEDIC SPINE SURGERY    Assessment & Plan/Medical Decision Makin y.o. male with Back Pain and imaging findings most notable for Lumbar Spondylosis, L4-5 Disc Extrusion        The clinical, physical and imaging findings were reviewed with the patient.  Jose Miguel  has a constellation of findings consistent with lumbar degenerative disc disease.  Fortunately patient remains neurologically intact and functional. Surgical and non surgical treatments were discussed with the patient and patient would not like surgery at this time. Discussed following up in 3 months for further evaluation.     Referral placed for physical therapy to work on core strengthening, lumbar ROM, strengthening, and stretching exercises.   Assessment & Plan  Lumbar disc herniation    Orders:  •  Ambulatory Referral to Orthopedic Surgery  •  Ambulatory Referral to Comprehensive Spine PT; Future    Chronic midline low back pain without sciatica    Orders:  •  Ambulatory Referral to Orthopedic Surgery  •  XR spine lumbar minimum 4 views non injury; Future    Lumbar radiculopathy    Orders:  •  Ambulatory Referral to Comprehensive Spine PT;  Future    Cervical radiculopathy    Orders:  •  Ambulatory Referral to Comprehensive Spine PT; Future        Subjective:      Chief Complaint: Back Pain    HPI:  Jose Miguel Clayton is a 54 y.o. male presenting for initial visit with chief complaint of low back pain - referred by Dr. Escamilla.  Pain began about 1 year ago.  Describes pain as numbing and annoying in nature which is mainly in his right lower leg.  Numbness comes and goes.  He describes his right foot to feel weak during walking. He was previously treated at [facility] for his back but wanted to switch to [facility] for in network treatment. He claims he received an epidural about 3 months ago which took away his back and hip pain for about a month.   Denies any wade trauma. Denies fever or chills, no night sweats. Denies any bladder or bowel changes.      Conservative therapy includes the following:   Medications: None    Injections: Epidural 3 months ago     Physical Therapy: HEP  Chiropractic Medicine: has attempted  Accupunture/Massage Therapy: has not attempted   These therapeutic modalities were ineffective at providing sustained pain relief/functional improvement.     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  Biopsies confirmed again in 2014 in 2015. Dr. Agarwal, EGD 12/12/2017 Positive intestinal metaplasia without dysplasia. EGD 2020 long segment Brennan's esophagus, biopsy negative.    • COVID-19    • COVID-19 virus infection 12/16/2020    15Dec: poitive test.16Dec:  Day 4. Some issues with SOB and cough. 18Dec: Day 6: Feeling better overall. Increased cough. 21Dec:  Day 9: First symptoms 11Dec. Continues with minor symptoms.  22Dec: Day 10. Has imporved a bit. Add Phenergan at HS. 12/23/2020.  Released from isolation see note 12/28: Still reporting lingering symptoms of COVID.   • GERD (gastroesophageal reflux disease) 08/30/2012    Description: s/p Carlos fundoplication, Dr. Valadez   • Herpes simplex type 1 infection 06/18/2014   • Hyperlipidemia 08/30/2012   • Lymphadenopathy 08/30/2012     Benign Cervical per Dr. Tomy Yeager   • Vitamin D deficiency 05/06/2017       Current Outpatient Medications   Medication Sig Dispense Refill   • atorvastatin (LIPITOR) 20 mg tablet Take 1 tablet (20 mg total) by mouth daily 90 tablet 3   • sulfaSALAzine (AZULFIDINE) 500 mg tablet Take 1 tablet (500 mg total) by mouth 2 (two) times a day 60 tablet 6     Current Facility-Administered Medications   Medication Dose Route Frequency Provider Last Rate Last Admin   • cyanocobalamin injection 1,000 mcg  1,000 mcg Intramuscular Once        • cyanocobalamin injection 1,000 mcg  1,000 mcg Intramuscular Weekly    1,000 mcg at 03/25/25 0858       Past Surgical History:   Procedure Laterality Date   • DEEP NECK LYMPH NODE BIOPSY / EXCISION      cervical node biopsy with dissection of jugular nodes   • EGD  01/2020    Dr Agarwal, [facility].Nissen fundoplication evidencing gastric fundus without abnormality, Long segment Brennan's esophagus without dysplasia, bx negative.    • EGD  11/10/2021    4 cm Brennan's esophagus biopsy positive for intestinal metaplasia and no dysplasia, biopsies stomach negative for H. pylori, Nissen intact by Dr. 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   Tobacco Use  "  • Smoking status: Never   • Smokeless tobacco: Never   Vaping Use   • Vaping status: Never Used   Substance and Sexual Activity   • Alcohol use: Yes     Alcohol/week: 1.0 - 2.0 standard drink of alcohol     Types: 1 - 2 Standard drinks or equivalent per week     Comment: socially   • Drug use: Not Currently   • Sexual activity: Not on file   Other Topics Concern   • Not on file   Social History Narrative   • Not on file     Social Drivers of Health     Financial Resource Strain: Not on file   Food Insecurity: Not on file   Transportation Needs: Not on file   Physical Activity: Not on file   Stress: Not on file   Social Connections: Not on file   Intimate Partner Violence: Not on file   Housing Stability: Not on file       Allergies   Allergen Reactions   • Azithromycin GI Intolerance     Category: Adverse Reaction;    • Penicillins      Other reaction(s): Other (See Comments)       Review of Systems  General- denies fever/chills  HEENT- denies hearing loss or sore throat  Eyes- denies eye pain or visual disturbances, denies red eyes  Respiratory- denies cough or SOB  Cardio- denies chest pain or palpitations  GI- denies abdominal pain  Endocrine- denies urinary frequency  Urinary- denies pain with urination  Musculoskeletal- Negative except noted above  Skin- denies rashes or wounds  Neurological- denies dizziness or headache  Psychiatric- denies anxiety or difficulty concentrating    Physical Exam  Ht 5' 11\" (1.803 m)   Wt 98.4 kg (217 lb)   BMI 30.27 kg/m²     General/Constitutional: No apparent distress: well-nourished and well developed.  Lymphatic: No appreciable lymphadenopathy  Respiratory: Non-labored breathing  Vascular: No edema, swelling or tenderness, except as noted in detailed exam.  Integumentary: No impressive skin lesions present, except as noted in detailed exam.  Psych: Normal mood and affect, oriented to person, place and time.  MSK: normal other than stated in HPI and exam  Gait & balance: " "no evidence of myelopathic gait, ambulates Independently     Lumbar spine range of motion:  -Forward flexion to 90  -Extension to neutral  There is mild tenderness with palpation along lumbar paraspinal musculature, no midline tenderness     Neurologic:    Lower Extremity Motor Function    Right  Left    Iliopsoas  5/5  5/5    Quadriceps 5/5 5/5   Tibialis anterior  5/5  5/5    EHL  5/5  5/5    Gastroc. muscle  5/5  5/5    Heel rise  5/5  5/5    Toe rise  5/5  5/5      Sensory: light touch is intact to bilateral lower extremities     Reflexes:  Intact    Other tests:  Straight Leg Raise: negative  Thony SI: positive  AZRA SI: negative  Greater troch: no tenderness   Internal/external hip ROM: intact, no pain   Flexion/extension knee ROM: intact, no pain   Vascular: WWP extremities    Diagnostic Tests   IMAGING: I have personally reviewed the images and these are my findings:  Lumbar Spine X-rays from 3/25/25: multi level lumbar spondylosis with loss of disc height, osteophyte formation and facet hypertrophy, no apparent spondylolisthesis, no appreciated lytic/blastic lesions, no obvious instability    Lumbar Spine MRI from 11/8/24: multi level lumbar disc degeneration with disc desiccation, loss of disc height, facet and ligamentum hypertrophy, right sided L4-5 disc herniation with caudal migration resulting in lateral recess/foraminal stenosis, also with left L5-S1 paracentral disc herniation     Electronic Medical Records were reviewed including Historical medical records    Procedures, if performed today     None performed       Portions of the record may have been created with voice recognition software.  Occasional wrong word or \"sound a like\" substitutions may have occurred due to the inherent limitations of voice recognition software.  Read the chart carefully and recognize, using context, where substitutions have occurred.     "

## 2025-03-25 NOTE — PROGRESS NOTES
Name: Jose Miguel Clayton      : 1970       MRN: [id number]   Encounter Provider: Kd Gomes MD   Encounter Date: 25  Encounter department: [facility] ORTHOPEDIC CARE SPECIALISTS [facility] ORTHOPEDIC SPINE SURGERY    Assessment & Plan/Medical Decision Makin y.o. male with Back Pain and imaging findings most notable for Lumbar Spondylosis, L4-5 Disc Extrusion        The clinical, physical and imaging findings were reviewed with the patient.  Jose Miguel  has a constellation of findings consistent with lumbar degenerative disc disease.  Fortunately patient remains neurologically intact and functional. Surgical and non surgical treatments were discussed with the patient and patient would not like surgery at this time. Discussed following up in 3 months for further evaluation.     Referral placed for physical therapy to work on core strengthening, lumbar ROM, strengthening, and stretching exercises.   Assessment & Plan  Lumbar disc herniation    Orders:    Ambulatory Referral to Orthopedic Surgery    Ambulatory Referral to Comprehensive Spine PT; Future    Chronic midline low back pain without sciatica    Orders:    Ambulatory Referral to Orthopedic Surgery    XR spine lumbar minimum 4 views non injury; Future    Lumbar radiculopathy    Orders:    Ambulatory Referral to Comprehensive Spine PT; Future    Cervical radiculopathy    Orders:    Ambulatory Referral to Comprehensive Spine PT; Future        Subjective:      Chief Complaint: Back Pain    HPI:  Jose Miguel Clayton is a 54 y.o. male presenting for initial visit with chief complaint of low back pain - referred by Dr. Escamilla.  Pain began about 1 year ago.  Describes pain as numbing and annoying in nature which is mainly in his right lower leg.  Numbness comes and goes.  He describes his right foot to feel weak during walking. He was previously treated at [facility] for his back but wanted to switch to [facility] for in network  treatment. He claims he received an epidural about 3 months ago which took away his back and hip pain for about a month.   Denies any wade trauma. Denies fever or chills, no night sweats. Denies any bladder or bowel changes.      Conservative therapy includes the following:   Medications: None    Injections: Epidural 3 months ago     Physical Therapy: HEP  Chiropractic Medicine: has attempted  Accupunture/Massage Therapy: has not attempted   These therapeutic modalities were ineffective at providing sustained pain relief/functional improvement.     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  Biopsies confirmed again in 2014 in 2015. Dr. Agarwal, EGD 12/12/2017 Positive intestinal metaplasia without dysplasia. EGD 2020 long segment Brennan's esophagus, biopsy negative.     COVID-19     COVID-19 virus infection 12/16/2020    15Dec: poitive test.16Dec:  Day 4. Some issues with SOB and cough. 18Dec: Day 6: Feeling better overall. Increased cough. 21Dec: Day 9: First symptoms 11Dec. Continues with minor symptoms.  22Dec: Day 10. Has imporved a bit. Add Phenergan at HS. 12/23/2020.  Released from isolation see note 12/28: Still reporting lingering symptoms of COVID.    GERD (gastroesophageal reflux disease) 08/30/2012    Description: s/p Carlos fundoplication, Dr. Valadez    Herpes simplex type 1 infection 06/18/2014    Hyperlipidemia 08/30/2012    Lymphadenopathy 08/30/2012     Benign Cervical per Dr. Tomy Yeager    Vitamin D deficiency 05/06/2017       Current Outpatient Medications   Medication Sig Dispense Refill    atorvastatin (LIPITOR) 20 mg  tablet Take 1 tablet (20 mg total) by mouth daily 90 tablet 3    sulfaSALAzine (AZULFIDINE) 500 mg tablet Take 1 tablet (500 mg total) by mouth 2 (two) times a day 60 tablet 6     Current Facility-Administered Medications   Medication Dose Route Frequency Provider Last Rate Last Admin    cyanocobalamin injection 1,000 mcg  1,000 mcg Intramuscular Once         cyanocobalamin injection 1,000 mcg  1,000 mcg Intramuscular Weekly    1,000 mcg at 03/25/25 0858       Past Surgical History:   Procedure Laterality Date    DEEP NECK LYMPH NODE BIOPSY / EXCISION      cervical node biopsy with dissection of jugular nodes    EGD  01/2020    Dr Agarwal, [facility].Nissen fundoplication evidencing gastric fundus without abnormality, Long segment Brennan's esophagus without dysplasia, bx negative.     EGD  11/10/2021    4 cm Brennan's esophagus biopsy positive for intestinal metaplasia and no dysplasia, biopsies stomach negative for H. pylori, Nissen intact by Dr. 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   Tobacco Use    Smoking status: Never    Smokeless tobacco: Never   Vaping Use    Vaping status: Never Used   Substance and Sexual Activity    Alcohol use: Yes     Alcohol/week: 1.0 - 2.0 standard drink of alcohol     Types: 1 - 2 Standard drinks or equivalent per week     Comment: socially    Drug use: Not Currently    Sexual activity: Not on file   Other Topics Concern    Not on file   Social History Narrative    Not on file     Social Drivers of Health     Financial Resource Strain: Not on file   Food Insecurity: Not on file   Transportation Needs: Not on file   Physical Activity: Not on file   Stress: Not on file  "  Social Connections: Not on file   Intimate Partner Violence: Not on file   Housing Stability: Not on file       Allergies   Allergen Reactions    Azithromycin GI Intolerance     Category: Adverse Reaction;     Penicillins      Other reaction(s): Other (See Comments)       Review of Systems  General- denies fever/chills  HEENT- denies hearing loss or sore throat  Eyes- denies eye pain or visual disturbances, denies red eyes  Respiratory- denies cough or SOB  Cardio- denies chest pain or palpitations  GI- denies abdominal pain  Endocrine- denies urinary frequency  Urinary- denies pain with urination  Musculoskeletal- Negative except noted above  Skin- denies rashes or wounds  Neurological- denies dizziness or headache  Psychiatric- denies anxiety or difficulty concentrating    Physical Exam  Ht 5' 11\" (1.803 m)   Wt 98.4 kg (217 lb)   BMI 30.27 kg/m²     General/Constitutional: No apparent distress: well-nourished and well developed.  Lymphatic: No appreciable lymphadenopathy  Respiratory: Non-labored breathing  Vascular: No edema, swelling or tenderness, except as noted in detailed exam.  Integumentary: No impressive skin lesions present, except as noted in detailed exam.  Psych: Normal mood and affect, oriented to person, place and time.  MSK: normal other than stated in HPI and exam  Gait & balance: no evidence of myelopathic gait, ambulates Independently     Lumbar spine range of motion:  -Forward flexion to 90  -Extension to neutral  There is mild tenderness with palpation along lumbar paraspinal musculature, no midline tenderness     Neurologic:    Lower Extremity Motor Function    Right  Left    Iliopsoas  5/5  5/5    Quadriceps 5/5 5/5   Tibialis anterior  5/5  5/5    EHL  5/5  5/5    Gastroc. muscle  5/5  5/5    Heel rise  5/5  5/5    Toe rise  5/5  5/5      Sensory: light touch is intact to bilateral lower extremities     Reflexes:  Intact    Other tests:  Straight Leg Raise: negative  Thony SI: " "positive  AZRA SI: negative  Greater troch: no tenderness   Internal/external hip ROM: intact, no pain   Flexion/extension knee ROM: intact, no pain   Vascular: WWP extremities    Diagnostic Tests   IMAGING: I have personally reviewed the images and these are my findings:  Lumbar Spine X-rays from 3/25/25: multi level lumbar spondylosis with loss of disc height, osteophyte formation and facet hypertrophy, no apparent spondylolisthesis, no appreciated lytic/blastic lesions, no obvious instability    Lumbar Spine MRI from 11/8/24: multi level lumbar disc degeneration with disc desiccation, loss of disc height, facet and ligamentum hypertrophy, right sided L4-5 disc herniation with caudal migration resulting in lateral recess/foraminal stenosis, also with left L5-S1 paracentral disc herniation     Electronic Medical Records were reviewed including Historical medical records    Procedures, if performed today     None performed       Portions of the record may have been created with voice recognition software.  Occasional wrong word or \"sound a like\" substitutions may have occurred due to the inherent limitations of voice recognition software.  Read the chart carefully and recognize, using context, where substitutions have occurred.   "

## 2025-03-25 NOTE — PROGRESS NOTES
Patient is here for nurse visit weekly B12 injections . I administered on her left deltoid. Patient tolerated well.

## 2025-04-01 ENCOUNTER — CLINICAL SUPPORT (OUTPATIENT)
Dept: NEUROLOGY | Facility: CLINIC | Age: 55
End: 2025-04-01
Payer: COMMERCIAL

## 2025-04-01 DIAGNOSIS — E53.8 B12 DEFICIENCY: Primary | ICD-10-CM

## 2025-04-01 PROCEDURE — 96372 THER/PROPH/DIAG INJ SC/IM: CPT | Performed by: PSYCHIATRY & NEUROLOGY

## 2025-04-01 RX ADMIN — CYANOCOBALAMIN 1000 MCG: 1000 INJECTION, SOLUTION INTRAMUSCULAR; SUBCUTANEOUS at 08:59

## 2025-04-07 ENCOUNTER — APPOINTMENT (OUTPATIENT)
Dept: LAB | Facility: CLINIC | Age: 55
End: 2025-04-07
Payer: COMMERCIAL

## 2025-04-07 ENCOUNTER — OFFICE VISIT (OUTPATIENT)
Dept: RHEUMATOLOGY | Facility: CLINIC | Age: 55
End: 2025-04-07
Payer: COMMERCIAL

## 2025-04-07 VITALS
HEART RATE: 97 BPM | DIASTOLIC BLOOD PRESSURE: 76 MMHG | SYSTOLIC BLOOD PRESSURE: 128 MMHG | HEIGHT: 71 IN | OXYGEN SATURATION: 95 % | WEIGHT: 216.6 LBS | BODY MASS INDEX: 30.32 KG/M2

## 2025-04-07 DIAGNOSIS — M13.80 SERONEGATIVE INFLAMMATORY ARTHRITIS: ICD-10-CM

## 2025-04-07 DIAGNOSIS — M35.2: Primary | ICD-10-CM

## 2025-04-07 DIAGNOSIS — M35.2: ICD-10-CM

## 2025-04-07 LAB — CRP SERPL QL: 11.9 MG/L

## 2025-04-07 PROCEDURE — 36415 COLL VENOUS BLD VENIPUNCTURE: CPT

## 2025-04-07 PROCEDURE — 99215 OFFICE O/P EST HI 40 MIN: CPT | Performed by: INTERNAL MEDICINE

## 2025-04-07 PROCEDURE — 86140 C-REACTIVE PROTEIN: CPT

## 2025-04-07 RX ORDER — PREDNISONE 5 MG/1
TABLET ORAL
Qty: 50 TABLET | Refills: 0 | Status: SHIPPED | OUTPATIENT
Start: 2025-04-07

## 2025-04-07 NOTE — ASSESSMENT & PLAN NOTE
Prednisone taper called in  Orders:    predniSONE 5 mg tablet; 4 tabs x5 days, then 3 tabs x5 days, then 2 tabs x5 days, then 1 tab x5 days, then stop.    C-reactive protein; Future

## 2025-04-07 NOTE — ASSESSMENT & PLAN NOTE
Luis Clayton is a 53 y/o male with h/o Behcet's, seronegative inflammatory arthritis     Transferring care from outside rheumatologist Dr Castro      Chronic low back pain, DDD, radiculopathy follows with pain management at [facility]    He has seen Orthopedic Spine surgery here at [facility]     Also reports h/o gout (not on urate lowering therapy)     Continues  BID (intolerant Otezla)     Check CRP         Orders:    predniSONE 5 mg tablet; 4 tabs x5 days, then 3 tabs x5 days, then 2 tabs x5 days, then 1 tab x5 days, then stop.    C-reactive protein; Future

## 2025-04-07 NOTE — PROGRESS NOTES
"Name: Jose Miguel Clayton      : 1970      MRN: [id number]  Encounter Provider: Everardo Cordova MD  Encounter Date: 2025   Encounter department: 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    C-reactive protein; Future    Seronegative inflammatory arthritis  Prednisone taper called in  Orders:    predniSONE 5 mg tablet; 4 tabs x5 days, then 3 tabs x5 days, then 2 tabs x5 days, then 1 tab x5 days, then stop.    C-reactive protein; Future        History of Present Illness   HPI  Jose Miguel Clayton is a 54 y.o. male who presents for further f/u Behcet's disease, seronegative inflammatory arthritis  History obtained from: patient    Since last visit he has increased GI symptoms, has seen GI in the past    H/o chronic low back pain, DDD, radiculopathy follows with pain management  at [facility] and gets injections    Also sees Orthopedic Spine surgery here at [facility]    Former patient Dr Castro     BID     Review of Systems  Pertinent Medical History       H/o Behcet's, seronegative inflammatory arthritis. He has past medical history HLD, GERD, anxiety     He has past medical history GERD s/p surgery. He is off PPI     Was dx with Behcet's per outside rheumatologist Dr Jhaveri ([facility]). Per patient he has had \"symptoms since childhood\"     Recurrent oral ulcers, \"sores in my scalp, acne\"   "   No rashes, inflammatory bowel disease     He has tried Otezla but did not tolerate due to severe nausea     Now on  BID and this has helped oral ulcers.      Also reports h/o gout (not on urate lowering therapy)     H/o chronic low back pain, DDD, radiculopathy follows with pain management  at [facility] and gets injections    --------------------------------------------------------------------------------------------------------        ROS:      - for: Fevers, Chills or sweats.  No HAs or scalp tenderness.  No jaw claudication.  No acute visual or eye changes.  No dry eyes.  No auditory complaints.  No oral lesions or ulcers.  No dry mouth.  No sore throat or cough.  No chest pains or palpitations.  No shortness of breath, dyspnea on exertion or wheezing.  No hemotpysis.  No abdominal pain, GERD symptoms, diarrhea or constipation.  No urinary complaints.  No numbness, tingling or weakness.  No rashes.    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  Biopsies confirmed again in 2014 in 2015. Dr. Agarwal, EGD 12/12/2017 Positive intestinal metaplasia without dysplasia. EGD 2020 long segment Brennan's esophagus, biopsy negative.     COVID-19     COVID-19 virus infection 12/16/2020    15Dec: poitive test.16Dec:  Day 4. Some issues with SOB and cough. 18Dec: Day 6: Feeling better overall. Increased cough. 21Dec: Day 9: First symptoms 11Dec. Continues with minor symptoms.  22Dec: Day 10. Has imporved a bit. Add Phenergan at HS. 12/23/2020.  Released from isolation see note 12/28: Still reporting lingering symptoms of COVID.    GERD (gastroesophageal reflux disease) 08/30/2012    Description: s/p Carlos fundoplication, Dr. Valadez     Herpes simplex type 1 infection 06/18/2014    Hyperlipidemia 08/30/2012    Lymphadenopathy 08/30/2012     Benign Cervical per Dr. Tomy Yeager    Vitamin D deficiency 05/06/2017           Past Surgical History    Past Surgical History:   Procedure Laterality Date    DEEP NECK LYMPH NODE BIOPSY / EXCISION      cervical node biopsy with dissection of jugular nodes    EGD  01/2020    Dr Agarwal, [facility].Nissen fundoplication evidencing gastric fundus without abnormality, Long segment Brennan's esophagus without dysplasia, bx negative.     EGD  11/10/2021    4 cm Brennan's esophagus biopsy positive for intestinal metaplasia and no dysplasia, biopsies stomach negative for H. pylori, Nissen intact by Dr. Agarwal    ESOPHAGOGASTRIC FUNDOPLASTY  03/2016    Nissen Fundoplication, Dr Valadez            Family History    Family History   Problem Relation Age of Onset    Heart attack Father         Anteroapical MI    Heart disease Father     Leukemia Brother             Social History    Social History     Tobacco Use    Smoking status: Never    Smokeless tobacco: Never   Vaping Use    Vaping status: Never Used   Substance Use Topics    Alcohol use: Yes     Alcohol/week: 1.0 - 2.0 standard drink of alcohol     Types: 1 - 2 Standard drinks or equivalent per week     Comment: socially    Drug use: Not Currently          Allergies    Allergies   Allergen Reactions    Azithromycin GI Intolerance     Category: Adverse Reaction;     Penicillins      Other reaction(s): Other (See Comments)         Medications    Current Outpatient Medications   Medication Instructions    atorvastatin (LIPITOR) 20 mg, Oral, Daily    sulfaSALAzine (AZULFIDINE) 500 mg, Oral, 2 times daily              ________________________________________________________________________    Results Review    BANG, RF, CCP HLA B 51 negative    Component      Latest Ref Rng 12/9/2024   URIC ACID      3.5 - 8.5 mg/dL 7.7    C-REACTIVE PROTEIN      <3.0 mg/L 2.7   "  RHEUMATOID FACTOR      Negative  Negative    CYCLIC CITRULLINATED PEPTIDE ANTIBODY      See comment  1.0    BANG SCREEN      Negative  Negative                 Objective   /76   Pulse 97   Ht 5' 11\" (1.803 m)   Wt 98.2 kg (216 lb 9.6 oz)   SpO2 95%   BMI 30.21 kg/m²      Physical Exam    GEN: AAO, No apparent distress.  Patient is well developed.  HEENT:  Pupils are equal, round and reactive.  Sclera are clear.  Fundoscopic exam is normal.  External ears are without lesions.  Oral pharynx is clear of ulcers or other lesions.  MMM.   NECK:  Supple.  There is no adenopathy appreciable in anterior or posterior cervical chains or supraclavicularly.  JVP is normal.    HEART: Regular rate and rhythm.  There is no appreciable murmur, gallop or rub.  LUNGS: Clear to auscultation.  ABD:  Soft, without tenderness, rebound or guarding.  No appreciable organomegally.  NEURO: Speech and cognition are normal.  Strength is 5/5 throughout.  Tone is normal.  DTRs are 2/4 at the knees, ankles and elbows.  Gait is normal.  SKIN: There are no rashes or lesions     MUSCULOSKELETAL:   No synovitis noted    Thank you for involving me in this patient's care.        Everardo Cordova MD  [facility]        I have spent a total time of 42 minutes in caring for this patient on the day of the visit/encounter including Diagnostic results, Prognosis, Risks and benefits of tx options, Impressions, Counseling / Coordination of care, Documenting in the medical record, Reviewing/placing orders in the medical record (including tests, medications, and/or procedures), and Obtaining or reviewing history  .    "

## 2025-04-08 ENCOUNTER — CLINICAL SUPPORT (OUTPATIENT)
Dept: NEUROLOGY | Facility: CLINIC | Age: 55
End: 2025-04-08
Payer: COMMERCIAL

## 2025-04-08 ENCOUNTER — OFFICE VISIT (OUTPATIENT)
Dept: FAMILY MEDICINE CLINIC | Facility: CLINIC | Age: 55
End: 2025-04-08
Payer: COMMERCIAL

## 2025-04-08 VITALS
OXYGEN SATURATION: 95 % | WEIGHT: 217.2 LBS | HEIGHT: 71 IN | SYSTOLIC BLOOD PRESSURE: 122 MMHG | BODY MASS INDEX: 30.41 KG/M2 | DIASTOLIC BLOOD PRESSURE: 82 MMHG | HEART RATE: 91 BPM | TEMPERATURE: 98.9 F

## 2025-04-08 DIAGNOSIS — E53.8 B12 DEFICIENCY: Primary | ICD-10-CM

## 2025-04-08 DIAGNOSIS — R20.2 PARESTHESIA OF LEFT LOWER EXTREMITY: ICD-10-CM

## 2025-04-08 DIAGNOSIS — K21.9 GASTROESOPHAGEAL REFLUX DISEASE, UNSPECIFIED WHETHER ESOPHAGITIS PRESENT: Primary | ICD-10-CM

## 2025-04-08 PROCEDURE — 96372 THER/PROPH/DIAG INJ SC/IM: CPT | Performed by: PSYCHIATRY & NEUROLOGY

## 2025-04-08 PROCEDURE — 99214 OFFICE O/P EST MOD 30 MIN: CPT | Performed by: NURSE PRACTITIONER

## 2025-04-08 RX ORDER — FAMOTIDINE 40 MG/1
40 TABLET, FILM COATED ORAL
Qty: 7 TABLET | Refills: 0 | Status: SHIPPED | OUTPATIENT
Start: 2025-04-08

## 2025-04-08 RX ORDER — ONDANSETRON 8 MG/1
8 TABLET, ORALLY DISINTEGRATING ORAL EVERY 8 HOURS PRN
Qty: 20 TABLET | Refills: 0 | Status: SHIPPED | OUTPATIENT
Start: 2025-04-08

## 2025-04-08 RX ADMIN — CYANOCOBALAMIN 1000 MCG: 1000 INJECTION, SOLUTION INTRAMUSCULAR; SUBCUTANEOUS at 09:12

## 2025-04-08 NOTE — ASSESSMENT & PLAN NOTE
I do feel that patient's symptoms are consistent with either silent GERD or an acute viral gastroenteritis.  I will trial the patient on Pepcid 40 mg daily to see if this improves his symptoms.  I did prescribe him a 7-day course of the medication.  If the symptoms return after he stops the medication this would point towards a GERD cause.  I did advise him that if this is a viral gastroenteritis that should work its way out of his system within the next few days.  He was also prescribed Zofran 8 mg to be used as needed every 8 hours for nausea.  Orders:  •  famotidine (PEPCID) 40 MG tablet; Take 1 tablet (40 mg total) by mouth daily before breakfast  •  ondansetron (ZOFRAN-ODT) 8 mg disintegrating tablet; Take 1 tablet (8 mg total) by mouth every 8 (eight) hours as needed for nausea or vomiting

## 2025-04-08 NOTE — PROGRESS NOTES
Name: Jose Miguel Clayton      : 1970      MRN: [id number]  Encounter Provider: IHSAN Walls  Encounter Date: 2025   Encounter department: [facility] PRIMARY CARE  :  Assessment & Plan  Gastroesophageal reflux disease, unspecified whether esophagitis present  I do feel that patient's symptoms are consistent with either silent GERD or an acute viral gastroenteritis.  I will trial the patient on Pepcid 40 mg daily to see if this improves his symptoms.  I did prescribe him a 7-day course of the medication.  If the symptoms return after he stops the medication this would point towards a GERD cause.  I did advise him that if this is a viral gastroenteritis that should work its way out of his system within the next few days.  He was also prescribed Zofran 8 mg to be used as needed every 8 hours for nausea.  Orders:  •  famotidine (PEPCID) 40 MG tablet; Take 1 tablet (40 mg total) by mouth daily before breakfast  •  ondansetron (ZOFRAN-ODT) 8 mg disintegrating tablet; Take 1 tablet (8 mg total) by mouth every 8 (eight) hours as needed for nausea or vomiting    Paresthesia of left lower extremity  Patient symptoms are consistent with possible meralgia paresthetica of the left lower extremity.  EMG of the left lower extremity was ordered to be completed to assess for this.  Orders:  •  EMG; Future           History of Present Illness   GI symptoms: Patient reports over the past 4 days he has been experiencing recurrent nausea and diarrhea.  He reports that he has only had a few scattered episodes of diarrhea however, the nausea has been consistent throughout most of these days.  Of note, the patient does have a history of Nissen fundoplication previously for treatment of GERD so he is unable to vomit.  Patient has not been taking GERD medication since the surgery.  He currently denies any epigastric burning or pain.  He also denies noting that the nausea is worse after eating or when waking  "in the morning.    Paresthesia of left lower extremity: Patient reports that over the past few months he has been noting recurrent paresthesias of the left thigh.  He reports that these tend to occur randomly and do not seem to be brought on by activity.  He denies any recent falls or known injuries to the area.      Review of Systems   Constitutional:  Negative for chills and fever.   HENT:  Negative for ear pain and sore throat.    Eyes:  Negative for pain and visual disturbance.   Respiratory:  Negative for cough, chest tightness, shortness of breath and wheezing.    Cardiovascular:  Negative for chest pain, palpitations and leg swelling.   Gastrointestinal:  Positive for diarrhea and nausea. Negative for abdominal distention, abdominal pain, anal bleeding, blood in stool, constipation, rectal pain and vomiting.   Endocrine: Negative for cold intolerance and heat intolerance.   Genitourinary:  Negative for decreased urine volume, dysuria and hematuria.   Musculoskeletal:  Negative for arthralgias, back pain and myalgias.   Skin:  Negative for color change and rash.   Allergic/Immunologic: Negative for environmental allergies.   Neurological:  Negative for dizziness, tremors, seizures, syncope, facial asymmetry, speech difficulty, weakness, light-headedness, numbness and headaches.        Paresthesia of left thigh   Hematological:  Negative for adenopathy.   Psychiatric/Behavioral:  Negative for confusion. The patient is not nervous/anxious.    All other systems reviewed and are negative.      Objective   /82 (BP Location: Right arm, Patient Position: Sitting, Cuff Size: Standard)   Pulse 91   Temp 98.9 °F (37.2 °C) (Skin)   Ht 5' 11\" (1.803 m)   Wt 98.5 kg (217 lb 3.2 oz)   SpO2 95%   BMI 30.29 kg/m²      Physical Exam  Vitals and nursing note reviewed.   Constitutional:       General: He is not in acute distress.     Appearance: Normal appearance. He is well-developed. He is not ill-appearing. "   HENT:      Head: Normocephalic.   Eyes:      Conjunctiva/sclera: Conjunctivae normal.   Cardiovascular:      Rate and Rhythm: Normal rate and regular rhythm.      Pulses: Normal pulses.           Carotid pulses are 2+ on the right side and 2+ on the left side.       Radial pulses are 2+ on the right side and 2+ on the left side.        Posterior tibial pulses are 2+ on the right side and 2+ on the left side.      Heart sounds: Normal heart sounds. No murmur heard.  Pulmonary:      Effort: Pulmonary effort is normal. No respiratory distress.      Breath sounds: Normal breath sounds. No decreased breath sounds, wheezing, rhonchi or rales.   Abdominal:      General: Abdomen is flat. Bowel sounds are normal. There is no distension.      Palpations: Abdomen is soft.      Tenderness: There is no abdominal tenderness. There is no guarding.   Musculoskeletal:         General: No swelling. Normal range of motion.      Cervical back: Normal range of motion and neck supple.      Right lower leg: No edema.      Left lower leg: No edema.   Skin:     General: Skin is warm and dry.      Capillary Refill: Capillary refill takes less than 2 seconds.   Neurological:      General: No focal deficit present.      Mental Status: He is alert and oriented to person, place, and time.   Psychiatric:         Mood and Affect: Mood normal.         Behavior: Behavior normal.         Thought Content: Thought content normal.         Judgment: Judgment normal.

## 2025-04-08 NOTE — ASSESSMENT & PLAN NOTE
Patient symptoms are consistent with possible meralgia paresthetica of the left lower extremity.  EMG of the left lower extremity was ordered to be completed to assess for this.  Orders:  •  EMG; Future

## 2025-04-18 ENCOUNTER — TELEPHONE (OUTPATIENT)
Age: 55
End: 2025-04-18

## 2025-04-18 NOTE — TELEPHONE ENCOUNTER
Patient called in regards to being on prednisone and believes medication is making him worst. Patient would like to know if he can stop taking medication. Patient stated that it was prescribed by his rheumatologist.

## 2025-04-18 NOTE — TELEPHONE ENCOUNTER
Saw GI doctor and had issues with his stomach prior to Prednisone but he believes the prednisone is making it worse. Started the taper pack las Monday asking if could stop.

## 2025-04-23 NOTE — TELEPHONE ENCOUNTER
3rd attempt. LM to CB to go over provider message also will send mychart message as well. If pt cb let him know that per Dr. Faith can stop Prednisone

## 2025-05-02 ENCOUNTER — APPOINTMENT (EMERGENCY)
Dept: RADIOLOGY | Facility: HOSPITAL | Age: 55
End: 2025-05-02
Payer: COMMERCIAL

## 2025-05-02 ENCOUNTER — HOSPITAL ENCOUNTER (EMERGENCY)
Facility: HOSPITAL | Age: 55
Discharge: HOME | End: 2025-05-02
Attending: EMERGENCY MEDICINE
Payer: COMMERCIAL

## 2025-05-02 VITALS
HEART RATE: 68 BPM | BODY MASS INDEX: 30.87 KG/M2 | RESPIRATION RATE: 19 BRPM | DIASTOLIC BLOOD PRESSURE: 90 MMHG | TEMPERATURE: 97.6 F | OXYGEN SATURATION: 95 % | SYSTOLIC BLOOD PRESSURE: 152 MMHG | WEIGHT: 221.34 LBS

## 2025-05-02 DIAGNOSIS — R07.9 INTERMITTENT CHEST PAIN: Primary | ICD-10-CM

## 2025-05-02 LAB
ALBUMIN SERPL BCG-MCNC: 3.8 G/DL (ref 3.5–5)
ALP SERPL-CCNC: 84 U/L (ref 34–104)
ALT SERPL W P-5'-P-CCNC: 23 U/L (ref 7–52)
ANION GAP SERPL CALCULATED.3IONS-SCNC: 4 MMOL/L (ref 4–13)
AST SERPL W P-5'-P-CCNC: 19 U/L (ref 13–39)
ATRIAL RATE: 80 BPM
BASOPHILS # BLD AUTO: 0.08 THOUSANDS/ÂΜL (ref 0–0.1)
BASOPHILS NFR BLD AUTO: 1 % (ref 0–1)
BILIRUB SERPL-MCNC: 0.37 MG/DL (ref 0.2–1)
BUN SERPL-MCNC: 15 MG/DL (ref 5–25)
CALCIUM SERPL-MCNC: 9 MG/DL (ref 8.4–10.2)
CARDIAC TROPONIN I PNL SERPL HS: 3 NG/L (ref ?–50)
CHLORIDE SERPL-SCNC: 105 MMOL/L (ref 96–108)
CO2 SERPL-SCNC: 29 MMOL/L (ref 21–32)
CREAT SERPL-MCNC: 0.96 MG/DL (ref 0.6–1.3)
EOSINOPHIL # BLD AUTO: 0.28 THOUSAND/ÂΜL (ref 0–0.61)
EOSINOPHIL NFR BLD AUTO: 4 % (ref 0–6)
ERYTHROCYTE [DISTWIDTH] IN BLOOD BY AUTOMATED COUNT: 11.9 % (ref 11.6–15.1)
GFR SERPL CREATININE-BSD FRML MDRD: 89 ML/MIN/1.73SQ M
GLUCOSE SERPL-MCNC: 94 MG/DL (ref 65–140)
HCT VFR BLD AUTO: 39.5 % (ref 36.5–49.3)
HGB BLD-MCNC: 13.6 G/DL (ref 12–17)
IMM GRANULOCYTES # BLD AUTO: 0.02 THOUSAND/UL (ref 0–0.2)
IMM GRANULOCYTES NFR BLD AUTO: 0 % (ref 0–2)
LYMPHOCYTES # BLD AUTO: 1.94 THOUSANDS/ÂΜL (ref 0.6–4.47)
LYMPHOCYTES NFR BLD AUTO: 26 % (ref 14–44)
MCH RBC QN AUTO: 32.2 PG (ref 26.8–34.3)
MCHC RBC AUTO-ENTMCNC: 34.4 G/DL (ref 31.4–37.4)
MCV RBC AUTO: 94 FL (ref 82–98)
MONOCYTES # BLD AUTO: 0.78 THOUSAND/ÂΜL (ref 0.17–1.22)
MONOCYTES NFR BLD AUTO: 11 % (ref 4–12)
NEUTROPHILS # BLD AUTO: 4.31 THOUSANDS/ÂΜL (ref 1.85–7.62)
NEUTS SEG NFR BLD AUTO: 58 % (ref 43–75)
NRBC BLD AUTO-RTO: 0 /100 WBCS
P AXIS: 8 DEGREES
PLATELET # BLD AUTO: 341 THOUSANDS/UL (ref 149–390)
PMV BLD AUTO: 8.6 FL (ref 8.9–12.7)
POTASSIUM SERPL-SCNC: 4 MMOL/L (ref 3.5–5.3)
PR INTERVAL: 136 MS
PROT SERPL-MCNC: 6.3 G/DL (ref 6.4–8.4)
QRS AXIS: 29 DEGREES
QRSD INTERVAL: 94 MS
QT INTERVAL: 380 MS
QTC INTERVAL: 438 MS
RBC # BLD AUTO: 4.22 MILLION/UL (ref 3.88–5.62)
SODIUM SERPL-SCNC: 138 MMOL/L (ref 135–147)
T WAVE AXIS: -10 DEGREES
VENTRICULAR RATE: 80 BPM
WBC # BLD AUTO: 7.41 THOUSAND/UL (ref 4.31–10.16)

## 2025-05-02 PROCEDURE — 93010 ELECTROCARDIOGRAM REPORT: CPT | Performed by: INTERNAL MEDICINE

## 2025-05-02 PROCEDURE — 99285 EMERGENCY DEPT VISIT HI MDM: CPT | Performed by: EMERGENCY MEDICINE

## 2025-05-02 PROCEDURE — 85025 COMPLETE CBC W/AUTO DIFF WBC: CPT

## 2025-05-02 PROCEDURE — 80053 COMPREHEN METABOLIC PANEL: CPT

## 2025-05-02 PROCEDURE — 99285 EMERGENCY DEPT VISIT HI MDM: CPT

## 2025-05-02 PROCEDURE — 36415 COLL VENOUS BLD VENIPUNCTURE: CPT

## 2025-05-02 PROCEDURE — 84484 ASSAY OF TROPONIN QUANT: CPT

## 2025-05-02 PROCEDURE — 71046 X-RAY EXAM CHEST 2 VIEWS: CPT

## 2025-05-02 PROCEDURE — 96360 HYDRATION IV INFUSION INIT: CPT

## 2025-05-02 PROCEDURE — 93005 ELECTROCARDIOGRAM TRACING: CPT

## 2025-05-02 RX ADMIN — SODIUM CHLORIDE 1000 ML: 0.9 INJECTION, SOLUTION INTRAVENOUS at 13:50

## 2025-05-02 NOTE — DISCHARGE INSTRUCTIONS
Follow up with family doctor    Return to the ED if you have any new/worsening symptoms including but not limited to worsening pain, difficulty breathing, lightheadedness, passing out, numbness, weakness, speech difficulty, etc.

## 2025-05-02 NOTE — ED PROVIDER NOTES
Time reflects when diagnosis was documented in both MDM as applicable and the Disposition within this note       Time User Action Codes Description Comment    5/2/2025  2:30 PM Kiah Ang Add [R07.9] Intermittent chest pain           ED Disposition       ED Disposition   Discharge    Condition   Stable    Date/Time   Fri May 2, 2025  2:30 PM    Comment   Jose Miguel Clayton discharge to home/self care.                   Assessment & Plan       Medical Decision Making  55 yo M with intermittent CP- EKG to r/o STEMI/dysrhythmia/abn intervals/ischemic changes, troponin to eval for ischemia, CBC to assess for leukocytosis/anemia, CMP to assess for elevated LFTs/ADRIANNE/electrolyte derangements, CXR to r/o PTX/PNA/effusion/CHF     ER workup unrevealing and felt well in the ED  Discussed delta troponin, but preferred to go home  Instructed to f/u with PCP   Close return precautions discussed and pt expressed comfort and understanding with plan     Problems Addressed:  Intermittent chest pain: acute illness or injury    Amount and/or Complexity of Data Reviewed  External Data Reviewed: labs, radiology, ECG and notes.  Labs: ordered. Decision-making details documented in ED Course.  Radiology: ordered and independent interpretation performed. Decision-making details documented in ED Course.  ECG/medicine tests: ordered and independent interpretation performed. Decision-making details documented in ED Course.        ED Course as of 05/02/25 2056   Fri May 02, 2025   1332 EKG independently interpreted by myself:  NSR @ 80 bpm, normal axis, normal intervals qtc 438, no sig st changes, Q wave III, twi III, no sig change compared to 6/24/23   1344 hs TnI 0hr: 3   1345 CXR independently interpreted by myself: no acute abn    1427 Discussed all results with pt and recommendation for repeat troponin, but pt would prefer to go home, understands reasoning for delta trop/risks of leaving. Return precautions reviewed        Medications  "  sodium chloride 0.9 % bolus 1,000 mL (0 mL Intravenous Stopped 5/2/25 2000)       ED Risk Strat Scores   HEART Risk Score      Flowsheet Row Most Recent Value   Heart Score Risk Calculator    History 0 Filed at: 05/02/2025 1343   ECG 1 Filed at: 05/02/2025 1343   Age 1 Filed at: 05/02/2025 1343   Risk Factors 1 Filed at: 05/02/2025 1343   Troponin 0 Filed at: 05/02/2025 1343   HEART Score 3 Filed at: 05/02/2025 1343          HEART Risk Score      Flowsheet Row Most Recent Value   Heart Score Risk Calculator    History 0 Filed at: 05/02/2025 1343   ECG 1 Filed at: 05/02/2025 1343   Age 1 Filed at: 05/02/2025 1343   Risk Factors 1 Filed at: 05/02/2025 1343   Troponin 0 Filed at: 05/02/2025 1343   HEART Score 3 Filed at: 05/02/2025 1343                      No data recorded                            History of Present Illness       Chief Complaint   Patient presents with    Chest Pain     Chest tightness since approx 10am. Pt states the pain is intermittent. He was at work when the pain started. Nothing makes the pain better or worse. Associated symptoms of dizziness and head pressure.     Dizziness     Dizziness since 10am. Pt states \"I feel disoriented\". Dizziness is constant. 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  Biopsies confirmed again in 2014 in 2015. Dr. Agarwal, EGD 12/12/2017 Positive intestinal metaplasia without dysplasia. EGD 2020 long segment Brennan's esophagus, biopsy negative.     COVID-19     COVID-19 virus infection 12/16/2020    15Dec: poitive test.16Dec:  Day 4. Some issues with SOB and cough. 18Dec: Day 6: Feeling better overall. Increased cough. 21Dec: Day 9: First symptoms 11Dec. Continues with minor symptoms.  22Dec: Day 10. Has imporved a bit. Add Phenergan at HS. 12/23/2020.  Released from isolation see note 12/28: Still " reporting lingering symptoms of COVID.    GERD (gastroesophageal reflux disease) 08/30/2012    Description: s/p Carlos fundoplication, Dr. Valadez    Herpes simplex type 1 infection 06/18/2014    Hyperlipidemia 08/30/2012    Lymphadenopathy 08/30/2012     Benign Cervical per Dr. Tomy Yeager    Vitamin D deficiency 05/06/2017      Past Surgical History:   Procedure Laterality Date    DEEP NECK LYMPH NODE BIOPSY / EXCISION      cervical node biopsy with dissection of jugular nodes    EGD  01/2020    Dr Agarwal, [facility].Nissen fundoplication evidencing gastric fundus without abnormality, Long segment Brennan's esophagus without dysplasia, bx negative.     EGD  11/10/2021    4 cm Brennan's esophagus biopsy positive for intestinal metaplasia and no dysplasia, biopsies stomach negative for H. pylori, Nissen intact by Dr. Agarwal    ESOPHAGOGASTRIC FUNDOPLASTY  03/2016    Nissen Fundoplication, Dr Valadez       Family History   Problem Relation Age of Onset    Heart attack Father         Anteroapical MI    Heart disease Father     Leukemia Brother       Social History     Tobacco Use    Smoking status: Never    Smokeless tobacco: Never   Vaping Use    Vaping status: Never Used   Substance Use Topics    Alcohol use: Yes     Alcohol/week: 1.0 - 2.0 standard drink of alcohol     Types: 1 - 2 Standard drinks or equivalent per week     Comment: socially    Drug use: Not Currently      E-Cigarette/Vaping    E-Cigarette Use Never User       E-Cigarette/Vaping Substances    Nicotine No     THC No     CBD No     Flavoring No     Other No     Unknown No       I have reviewed and agree with the history as documented.     55 yo M h/o GERD/Brennan's esophagus, Behcet syndrome; presenting for evaluation of CP.    He was at work standing up at desk when sx started, nothing exertional. Reports brief few seconds of CP with associated lightheadedness/head pressure describes as head in a vice. States this was intermittent leading to coming in. Not  currently present. States this has happened in the past.     Denies numbness, weakness, neck pain, SOB, leg pain/swelling  Chronic upper abdominal pain that is unchanged  No known CAD.                 Per independent review of external records:   - 4/30/25 GI- GERD with h/o Nissen fundoplication (2016), Brennan's esophagitis, behcet syndrome,   Nausea/upper abdominal pain- rx for Protonix 40 QD  - 4/8/24 Stress test WNL    Review of Systems        Objective       ED Triage Vitals [05/02/25 1257]   Temperature Pulse Blood Pressure Respirations SpO2 Patient Position - Orthostatic VS   97.6 °F (36.4 °C) 78 152/90 16 98 % Sitting      Temp Source Heart Rate Source BP Location FiO2 (%) Pain Score    Oral Monitor Right arm -- --      Vitals      Date and Time Temp Pulse SpO2 Resp BP Pain Score FACES Pain Rating User   05/02/25 1400 -- 68 95 % 19 -- -- -- HI   05/02/25 1257 97.6 °F (36.4 °C) 78 98 % 16 152/90 -- -- ML            Physical Exam  Vitals and nursing note reviewed.   Constitutional:       General: He is not in acute distress.     Appearance: Normal appearance. He is well-developed.   HENT:      Head: Normocephalic and atraumatic.      Nose: Nose normal.   Eyes:      Extraocular Movements: Extraocular movements intact.      Conjunctiva/sclera: Conjunctivae normal.   Cardiovascular:      Rate and Rhythm: Normal rate and regular rhythm.      Heart sounds: Normal heart sounds.   Pulmonary:      Effort: Pulmonary effort is normal. No respiratory distress.      Breath sounds: Normal breath sounds. No stridor. No wheezing or rales.   Chest:      Chest wall: No tenderness.   Abdominal:      General: There is no distension.      Palpations: Abdomen is soft.      Tenderness: There is no abdominal tenderness. There is no guarding or rebound.   Musculoskeletal:         General: No swelling, tenderness or deformity.      Cervical back: Normal range of motion and neck supple.      Comments: No calf swelling/ttp, no  peripheral edema   Skin:     General: Skin is warm and dry.      Findings: No rash.   Neurological:      General: No focal deficit present.      Mental Status: He is alert and oriented to person, place, and time.      Motor: No abnormal muscle tone.      Coordination: Coordination normal.   Psychiatric:         Thought Content: Thought content normal.         Judgment: Judgment normal.         Results Reviewed       Procedure Component Value Units Date/Time    Comprehensive metabolic panel [[id number]]  (Abnormal) Collected: 05/02/25 1312    Lab Status: Final result Specimen: Blood from Arm, Left Updated: 05/02/25 1342     Sodium 138 mmol/L      Potassium 4.0 mmol/L      Chloride 105 mmol/L      CO2 29 mmol/L      ANION GAP 4 mmol/L      BUN 15 mg/dL      Creatinine 0.96 mg/dL      Glucose 94 mg/dL      Calcium 9.0 mg/dL      AST 19 U/L      ALT 23 U/L      Alkaline Phosphatase 84 U/L      Total Protein 6.3 g/dL      Albumin 3.8 g/dL      Total Bilirubin 0.37 mg/dL      eGFR 89 ml/min/1.73sq m     Narrative:      National Kidney Disease Foundation guidelines for Chronic Kidney Disease (CKD):     Stage 1 with normal or high GFR (GFR > 90 mL/min/1.73 square meters)    Stage 2 Mild CKD (GFR = 60-89 mL/min/1.73 square meters)    Stage 3A Moderate CKD (GFR = 45-59 mL/min/1.73 square meters)    Stage 3B Moderate CKD (GFR = 30-44 mL/min/1.73 square meters)    Stage 4 Severe CKD (GFR = 15-29 mL/min/1.73 square meters)    Stage 5 End Stage CKD (GFR <15 mL/min/1.73 square meters)  Note: GFR calculation is accurate only with a steady state creatinine    HS Troponin 0hr (reflex protocol) [[id number]]  (Normal) Collected: 05/02/25 1312    Lab Status: Final result Specimen: Blood from Arm, Left Updated: 05/02/25 1341     hs TnI 0hr 3 ng/L     CBC and differential [[id number]]  (Abnormal) Collected: 05/02/25 1312    Lab Status: Final result Specimen: Blood from Arm, Left Updated: 05/02/25 1319     WBC 7.41 Thousand/uL      RBC 4.22  Million/uL      Hemoglobin 13.6 g/dL      Hematocrit 39.5 %      MCV 94 fL      MCH 32.2 pg      MCHC 34.4 g/dL      RDW 11.9 %      MPV 8.6 fL      Platelets 341 Thousands/uL      nRBC 0 /100 WBCs      Segmented % 58 %      Immature Grans % 0 %      Lymphocytes % 26 %      Monocytes % 11 %      Eosinophils Relative 4 %      Basophils Relative 1 %      Absolute Neutrophils 4.31 Thousands/µL      Absolute Immature Grans 0.02 Thousand/uL      Absolute Lymphocytes 1.94 Thousands/µL      Absolute Monocytes 0.78 Thousand/µL      Eosinophils Absolute 0.28 Thousand/µL      Basophils Absolute 0.08 Thousands/µL             XR chest 2 views   ED Interpretation by Kiah Ang DO ([id number])   IMPRESSION:     No acute cardiopulmonary disease.        Final Interpretation by Marialuisa Wheeler MD ([id number])      No acute cardiopulmonary disease.            Workstation performed: [id number]             Procedures    ED Medication and Procedure Management   Prior to Admission Medications   Prescriptions Last Dose Informant Patient Reported? Taking?   VITAMIN D PO   Yes No   Sig: Take by mouth   atorvastatin (LIPITOR) 20 mg tablet   No No   Sig: Take 1 tablet (20 mg total) by mouth daily   ondansetron (ZOFRAN-ODT) 8 mg disintegrating tablet   No No   Sig: Take 1 tablet (8 mg total) by mouth every 8 (eight) hours as needed for nausea or vomiting   pantoprazole (PROTONIX) 40 mg tablet   No No   Sig: Take 1 tablet (40 mg total) by mouth daily   predniSONE 5 mg tablet   No No   Si tabs x5 days, then 3 tabs x5 days, then 2 tabs x5 days, then 1 tab x5 days, then stop.   sulfaSALAzine (AZULFIDINE) 500 mg tablet   No No   Sig: Take 1 tablet (500 mg total) by mouth 2 (two) times a day      Facility-Administered Medications: None     Discharge Medication List as of 2025  2:31 PM        CONTINUE these medications which have NOT CHANGED    Details   atorvastatin (LIPITOR) 20 mg tablet Take 1 tablet (20 mg total) by mouth  daily, Starting Thu 4/11/2024, Normal      ondansetron (ZOFRAN-ODT) 8 mg disintegrating tablet Take 1 tablet (8 mg total) by mouth every 8 (eight) hours as needed for nausea or vomiting, Starting Tue 4/8/2025, Normal      pantoprazole (PROTONIX) 40 mg tablet Take 1 tablet (40 mg total) by mouth daily, Starting Wed 4/30/2025, Normal      predniSONE 5 mg tablet 4 tabs x5 days, then 3 tabs x5 days, then 2 tabs x5 days, then 1 tab x5 days, then stop., Normal      sulfaSALAzine (AZULFIDINE) 500 mg tablet Take 1 tablet (500 mg total) by mouth 2 (two) times a day, Starting Mon 12/9/2024, Normal      VITAMIN D PO Take by mouth, Historical Med           No discharge procedures on file.  ED SEPSIS DOCUMENTATION   Time reflects when diagnosis was documented in both MDM as applicable and the Disposition within this note       Time User Action Codes Description Comment    5/2/2025  2:30 PM Kiah Ang Add [R07.9] Intermittent chest pain                  Kiah Ang DO  05/02/25 2056

## 2025-05-06 ENCOUNTER — DOCUMENTATION (OUTPATIENT)
Dept: CCU | Facility: HOSPITAL | Age: 55
End: 2025-05-06

## 2025-05-06 ENCOUNTER — CLINICAL SUPPORT (OUTPATIENT)
Dept: NEUROLOGY | Facility: CLINIC | Age: 55
End: 2025-05-06
Payer: COMMERCIAL

## 2025-05-06 DIAGNOSIS — E53.8 DEFICIENCY OF VITAMIN B12: Primary | ICD-10-CM

## 2025-05-06 DIAGNOSIS — E53.8 B12 DEFICIENCY: Primary | ICD-10-CM

## 2025-05-06 PROCEDURE — 96372 THER/PROPH/DIAG INJ SC/IM: CPT | Performed by: PSYCHIATRY & NEUROLOGY

## 2025-05-06 RX ORDER — CYANOCOBALAMIN 1000 UG/ML
1000 INJECTION, SOLUTION INTRAMUSCULAR; SUBCUTANEOUS
Status: DISPENSED | OUTPATIENT
Start: 2025-05-06

## 2025-05-06 RX ORDER — CYANOCOBALAMIN 1000 UG/ML
INJECTION, SOLUTION INTRAMUSCULAR; SUBCUTANEOUS
Qty: 9 ML | Refills: 0 | Status: SHIPPED | OUTPATIENT
Start: 2025-05-06

## 2025-05-06 RX ADMIN — CYANOCOBALAMIN 1000 MCG: 1000 INJECTION, SOLUTION INTRAMUSCULAR; SUBCUTANEOUS at 08:53

## 2025-05-08 ENCOUNTER — OFFICE VISIT (OUTPATIENT)
Dept: FAMILY MEDICINE CLINIC | Facility: CLINIC | Age: 55
End: 2025-05-08
Payer: COMMERCIAL

## 2025-05-08 VITALS
SYSTOLIC BLOOD PRESSURE: 124 MMHG | WEIGHT: 219.6 LBS | HEART RATE: 85 BPM | HEIGHT: 71 IN | BODY MASS INDEX: 30.74 KG/M2 | DIASTOLIC BLOOD PRESSURE: 70 MMHG | OXYGEN SATURATION: 98 %

## 2025-05-08 DIAGNOSIS — R53.83 OTHER FATIGUE: ICD-10-CM

## 2025-05-08 DIAGNOSIS — M13.80 SERONEGATIVE INFLAMMATORY ARTHRITIS: ICD-10-CM

## 2025-05-08 DIAGNOSIS — K22.70 BARRETT'S ESOPHAGUS WITHOUT DYSPLASIA: ICD-10-CM

## 2025-05-08 DIAGNOSIS — K21.9 GASTROESOPHAGEAL REFLUX DISEASE, UNSPECIFIED WHETHER ESOPHAGITIS PRESENT: ICD-10-CM

## 2025-05-08 DIAGNOSIS — R07.9 CHEST PAIN, UNSPECIFIED TYPE: ICD-10-CM

## 2025-05-08 DIAGNOSIS — M35.2: ICD-10-CM

## 2025-05-08 DIAGNOSIS — Z00.00 HEALTHCARE MAINTENANCE: Primary | ICD-10-CM

## 2025-05-08 PROCEDURE — 99396 PREV VISIT EST AGE 40-64: CPT | Performed by: FAMILY MEDICINE

## 2025-05-08 PROCEDURE — 99214 OFFICE O/P EST MOD 30 MIN: CPT | Performed by: FAMILY MEDICINE

## 2025-05-08 PROCEDURE — 96372 THER/PROPH/DIAG INJ SC/IM: CPT | Performed by: FAMILY MEDICINE

## 2025-05-08 RX ORDER — TRIAMCINOLONE ACETONIDE 40 MG/ML
40 INJECTION, SUSPENSION INTRA-ARTICULAR; INTRAMUSCULAR ONCE
Status: COMPLETED | OUTPATIENT
Start: 2025-05-08 | End: 2025-05-08

## 2025-05-08 RX ADMIN — TRIAMCINOLONE ACETONIDE 40 MG: 40 INJECTION, SUSPENSION INTRA-ARTICULAR; INTRAMUSCULAR at 13:17

## 2025-05-08 NOTE — ASSESSMENT & PLAN NOTE
Follow-up with rheumatology will give Kenalog 40 mg IM in office  Orders:  •  triamcinolone acetonide (KENALOG-40) 40 mg/mL injection 40 mg

## 2025-05-08 NOTE — PROGRESS NOTES
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)  Follow-up with rheumatology will give Kenalog 40 mg IM in office  Orders:  •  triamcinolone acetonide (KENALOG-40) 40 mg/mL injection 40 mg    Seronegative inflammatory arthritis  As above  Orders:  •  triamcinolone acetonide (KENALOG-40) 40 mg/mL injection 40 mg    Other fatigue  Multifactorial       Brennan's esophagus without dysplasia  Stable on pantoprazole status post Carlos fundoplication       Gastroesophageal reflux disease, unspecified whether esophagitis present  As above       Chest pain, unspecified type  In ER, chest x-ray troponin negative in ER  Will check stress echocardiogram  Orders:  •  Echo stress test, exercise; Future        Preventive Screenings:  - Diabetes Screening: screening up-to-date  - Cholesterol Screening: screening not indicated and has hyperlipidemia   - Hepatitis C screening: screening up-to-date   - HIV screening: screening up-to-date   - Colon cancer screening: screening up-to-date   - Lung cancer screening: screening not indicated   - Prostate cancer screening: risks/benefits discussed and screening up-to-date     Patient has blood work to include PSA already ordered       Immunizations:  - Immunizations due: Tdap, Zoster (Shingrix) and Unable to give immunization secondary to steroid injection  - Risks/benefits immunizations discussed    - The patient declines recommended vaccines currently despite my recommendations      Counseling/Anticipatory Guidance:    - Diet: discussed recommendations for a healthy/well-balanced  "diet.          History of Present Illness     Adult Annual Physical:  Patient presents for annual physical. Patient is here for follow-up agreed to do annual exam.     Diet and Physical Activity:  - Diet/Nutrition: well balanced diet.  - Exercise: no formal exercise.    Depression Screening:  - PHQ-2 Score: 0    General Health:  - Sleep: 7-8 hours of sleep on average.  - Hearing: normal hearing bilateral ears.  - Vision: no vision problems.  - Dental: regular dental visits.     Health:  - History of STDs: no.   - Urinary symptoms: none.     Advanced Care Planning:  - Has an advanced directive?: no    - Has a durable medical POA?: no    - ACP document given to patient?: yes      Review of Systems   Constitutional:         Sepsis causing a very severe fatigue   HENT:          Patient is having ongoing \"canker sores\" secondary to the Bec[place]te   Eyes: Negative.    Respiratory: Negative.     Cardiovascular:         Patient was in ER 5/2/2025 for chest pain and dizziness, chest x-ray troponin and EKG were normal.  Patient did not stay for second troponin.  At the present time patient denies chest pain or dizziness but has severe fatigue   Gastrointestinal: Negative.    Endocrine: Negative.    Genitourinary: Negative.    Musculoskeletal:         Myalgias secondary to his autoimmune disease   Skin: Negative.    Allergic/Immunologic: Negative.    Neurological: Negative.    Hematological: Negative.    Psychiatric/Behavioral: Negative.           Objective   /90 (BP Location: Right arm, Patient Position: Sitting, Cuff Size: Standard)   Pulse 85   Ht 5' 11\" (1.803 m)   Wt 99.6 kg (219 lb 9.6 oz)   SpO2 98%   BMI 30.63 kg/m²     Physical Exam  Vitals and nursing note reviewed.   Constitutional:       Appearance: Normal appearance.   HENT:      Head: Normocephalic and atraumatic.      Right Ear: Tympanic membrane normal.      Left Ear: Tympanic membrane normal.      Nose: Nose normal.      Mouth/Throat:      Mouth: " Mucous membranes are moist.      Pharynx: No oropharyngeal exudate or posterior oropharyngeal erythema.      Comments: Several aphthous ulcers in her lower lip  Eyes:      Conjunctiva/sclera: Conjunctivae normal.   Neck:      Vascular: No carotid bruit.   Cardiovascular:      Rate and Rhythm: Normal rate and regular rhythm.      Heart sounds: Normal heart sounds.   Pulmonary:      Effort: Pulmonary effort is normal.      Breath sounds: Normal breath sounds.   Abdominal:      General: Bowel sounds are normal.      Palpations: Abdomen is soft.      Tenderness: There is no abdominal tenderness.   Musculoskeletal:      Cervical back: Neck supple.      Right lower leg: No edema.      Left lower leg: No edema.   Lymphadenopathy:      Cervical: No cervical adenopathy.   Skin:     General: Skin is warm and dry.   Neurological:      General: No focal deficit present.      Mental Status: He is alert.   Psychiatric:         Mood and Affect: Mood normal.

## 2025-05-08 NOTE — PROGRESS NOTES
"Name: Jose Miguel Clayton      : 1970      MRN: [id number]  Encounter Provider: James Escamilla DO  Encounter Date: 2025   Encounter department: [facility] PRIMARY CARE  :  Assessment & Plan         History of Present Illness {?Quick Links Encounters * My Last Note * Last Note in Specialty * Snapshot * Since Last Visit * History :18258}  HPI  Review of Systems    Objective {?Quick Links Trend Vitals * Enter New Vitals * Results Review * Timeline (Adult) * Labs * Imaging * Cardiology * Procedures * Lung Cancer Screening * Surgical eConsent :50270}  /90 (BP Location: Right arm, Patient Position: Sitting, Cuff Size: Standard)   Pulse 85   Ht 5' 11\" (1.803 m)   Wt 99.6 kg (219 lb 9.6 oz)   SpO2 98%   BMI 30.63 kg/m²      Physical Exam  {Administrative / Billing Section (Optional):86046}  "

## 2025-05-08 NOTE — PATIENT INSTRUCTIONS
Kenalog 40 mg IM given in office  Continue other medications  Follow with all specialist further instructions  In light of fatigue and chest pain we will check stress echocardiogram however doubtful of cardiac etiology secondary to negative stress echo last year  Return in 1 week if still with symptoms

## 2025-05-29 ENCOUNTER — APPOINTMENT (OUTPATIENT)
Dept: LAB | Facility: HOSPITAL | Age: 55
End: 2025-05-29
Payer: COMMERCIAL

## 2025-05-29 DIAGNOSIS — M35.00 SICCA SYNDROME (HCC): ICD-10-CM

## 2025-05-29 DIAGNOSIS — M12.249 VILLONODULAR SYNOVITIS OF THE HAND, UNSPECIFIED LATERALITY: ICD-10-CM

## 2025-05-29 DIAGNOSIS — M06.09 RHEUMATOID ARTHRITIS OF MULTIPLE SITES WITHOUT RHEUMATOID FACTOR (HCC): Primary | ICD-10-CM

## 2025-05-29 LAB
C3 SERPL-MCNC: 115 MG/DL (ref 87–200)
C4 SERPL-MCNC: 17 MG/DL (ref 19–52)
CRP SERPL QL: 4 MG/L
ERYTHROCYTE [SEDIMENTATION RATE] IN BLOOD: 6 MM/HOUR (ref 0–19)
IGA SERPL-MCNC: 439 MG/DL (ref 66–433)
IGG SERPL-MCNC: 811 MG/DL (ref 635–1741)
IGM SERPL-MCNC: 90 MG/DL (ref 45–281)

## 2025-05-29 PROCEDURE — 82164 ANGIOTENSIN I ENZYME TEST: CPT

## 2025-05-29 PROCEDURE — 86235 NUCLEAR ANTIGEN ANTIBODY: CPT

## 2025-05-29 PROCEDURE — 85652 RBC SED RATE AUTOMATED: CPT

## 2025-05-29 PROCEDURE — 36415 COLL VENOUS BLD VENIPUNCTURE: CPT

## 2025-05-29 PROCEDURE — 86038 ANTINUCLEAR ANTIBODIES: CPT

## 2025-05-29 PROCEDURE — 86705 HEP B CORE ANTIBODY IGM: CPT

## 2025-05-29 PROCEDURE — 86803 HEPATITIS C AB TEST: CPT

## 2025-05-29 PROCEDURE — 86225 DNA ANTIBODY NATIVE: CPT

## 2025-05-29 PROCEDURE — 86140 C-REACTIVE PROTEIN: CPT

## 2025-05-29 PROCEDURE — 86162 COMPLEMENT TOTAL (CH50): CPT

## 2025-05-29 PROCEDURE — 82784 ASSAY IGA/IGD/IGG/IGM EACH: CPT

## 2025-05-29 PROCEDURE — 86160 COMPLEMENT ANTIGEN: CPT

## 2025-05-30 LAB
HBV CORE IGM SER QL: NORMAL
HCV AB SER QL: NORMAL

## 2025-05-31 LAB
ENA RNP AB SER-ACNC: <0.2 AI (ref 0–0.9)
ENA SM AB SER-ACNC: <0.2 AI (ref 0–0.9)

## 2025-06-01 LAB
CENTROMERE B AB SER-ACNC: <0.7 U/ML (ref ?–10)
DSDNA IGG SERPL IA-ACNC: <0.9 IU/ML (ref ?–15)
ENA JO1 AB SER IA-ACNC: <0.5 U/ML (ref ?–10)
ENA SS-A AB SER IA-ACNC: <0.5 U/ML (ref ?–10)
ENA SS-B IGG SER IA-ACNC: <0.6 U/ML (ref ?–10)

## 2025-06-02 LAB
ACE SERPL-CCNC: 24 U/L (ref 14–82)
CH50 SERPL-ACNC: 45 U/ML
DSDNA IGG SERPL IA-ACNC: <0.9 IU/ML (ref ?–15)
NUCLEAR IGG SER IA-RTO: <0.09 RATIO (ref ?–1)

## 2025-06-03 ENCOUNTER — CLINICAL SUPPORT (OUTPATIENT)
Dept: NEUROLOGY | Facility: CLINIC | Age: 55
End: 2025-06-03
Payer: COMMERCIAL

## 2025-06-03 DIAGNOSIS — E53.8 B12 DEFICIENCY: Primary | ICD-10-CM

## 2025-06-03 PROCEDURE — 96372 THER/PROPH/DIAG INJ SC/IM: CPT | Performed by: PSYCHIATRY & NEUROLOGY

## 2025-06-05 ENCOUNTER — HOSPITAL ENCOUNTER (OUTPATIENT)
Dept: ULTRASOUND IMAGING | Facility: HOSPITAL | Age: 55
Discharge: HOME | End: 2025-06-05
Attending: INTERNAL MEDICINE
Payer: COMMERCIAL

## 2025-06-05 DIAGNOSIS — M06.09 RHEUMATOID ARTHRITIS WITHOUT RHEUMATOID FACTOR, MULTIPLE SITES (HCC): ICD-10-CM

## 2025-06-05 PROCEDURE — 76536 US EXAM OF HEAD AND NECK: CPT

## 2025-06-12 ENCOUNTER — TELEPHONE (OUTPATIENT)
Dept: OBGYN CLINIC | Facility: HOSPITAL | Age: 55
End: 2025-06-12

## 2025-06-12 NOTE — TELEPHONE ENCOUNTER
Called and spoke with pt about reschedule his appointment on 6/23/25 due to Dr. Gomes being OOO. Pt stated he would call back next week to reschedule once he has his work schedule.

## 2025-06-16 ENCOUNTER — TELEPHONE (OUTPATIENT)
Age: 55
End: 2025-06-16

## 2025-06-16 NOTE — TELEPHONE ENCOUNTER
Patient called in and stated that he had an appointment this weeks with Neuro. He stated that the issue is more with his left side and not his right- he stated that another Dr. Sent in the referral this. Advised to contact the other doctor.    Call need well.

## 2025-06-19 ENCOUNTER — HOSPITAL ENCOUNTER (OUTPATIENT)
Dept: NEUROLOGY | Facility: CLINIC | Age: 55
End: 2025-06-19
Payer: COMMERCIAL

## 2025-06-19 DIAGNOSIS — R20.2 PARESTHESIA: ICD-10-CM

## 2025-06-19 PROBLEM — M54.16 RADICULOPATHY, LUMBAR REGION: Status: ACTIVE | Noted: 2025-06-19

## 2025-06-19 PROCEDURE — 95886 MUSC TEST DONE W/N TEST COMP: CPT | Performed by: PSYCHIATRY & NEUROLOGY

## 2025-06-19 PROCEDURE — 95911 NRV CNDJ TEST 9-10 STUDIES: CPT | Performed by: PSYCHIATRY & NEUROLOGY

## 2025-06-26 DIAGNOSIS — E78.2 MIXED HYPERLIPIDEMIA: ICD-10-CM

## 2025-06-27 ENCOUNTER — TRANSCRIBE ORDERS (OUTPATIENT)
Dept: ADMINISTRATIVE | Facility: HOSPITAL | Age: 55
End: 2025-06-27

## 2025-06-27 DIAGNOSIS — M06.09 RHEUMATOID ARTHRITIS OF MULTIPLE SITES WITHOUT RHEUMATOID FACTOR (HCC): ICD-10-CM

## 2025-06-27 DIAGNOSIS — M06.09 RHEUMATOID ARTHRITIS WITHOUT RHEUMATOID FACTOR, MULTIPLE SITES (HCC): Primary | ICD-10-CM

## 2025-06-27 RX ORDER — ATORVASTATIN CALCIUM 20 MG/1
20 TABLET, FILM COATED ORAL DAILY
Qty: 90 TABLET | Refills: 1 | Status: SHIPPED | OUTPATIENT
Start: 2025-06-27

## 2025-07-01 ENCOUNTER — CLINICAL SUPPORT (OUTPATIENT)
Dept: NEUROLOGY | Facility: CLINIC | Age: 55
End: 2025-07-01
Payer: COMMERCIAL

## 2025-07-01 DIAGNOSIS — E53.8 B12 DEFICIENCY: Primary | ICD-10-CM

## 2025-07-01 PROCEDURE — 96372 THER/PROPH/DIAG INJ SC/IM: CPT | Performed by: PSYCHIATRY & NEUROLOGY

## 2025-07-01 RX ADMIN — CYANOCOBALAMIN 1000 MCG: 1000 INJECTION, SOLUTION INTRAMUSCULAR; SUBCUTANEOUS at 08:55

## 2025-07-03 ENCOUNTER — APPOINTMENT (OUTPATIENT)
Dept: LAB | Facility: CLINIC | Age: 55
End: 2025-07-03
Payer: COMMERCIAL

## 2025-07-03 DIAGNOSIS — E78.2 MIXED HYPERLIPIDEMIA: ICD-10-CM

## 2025-07-03 DIAGNOSIS — M13.80 SERONEGATIVE INFLAMMATORY ARTHRITIS: ICD-10-CM

## 2025-07-03 DIAGNOSIS — M79.601 RIGHT ARM PAIN: ICD-10-CM

## 2025-07-03 DIAGNOSIS — M06.09 RHEUMATOID ARTHRITIS OF MULTIPLE SITES WITHOUT RHEUMATOID FACTOR (HCC): ICD-10-CM

## 2025-07-03 DIAGNOSIS — M35.2: ICD-10-CM

## 2025-07-03 DIAGNOSIS — D75.839 THROMBOCYTOSIS: ICD-10-CM

## 2025-07-03 DIAGNOSIS — M06.09 RHEUMATOID ARTHRITIS WITHOUT RHEUMATOID FACTOR, MULTIPLE SITES (HCC): ICD-10-CM

## 2025-07-03 DIAGNOSIS — Z12.5 SCREENING FOR PROSTATE CANCER: ICD-10-CM

## 2025-07-03 DIAGNOSIS — M79.671 RIGHT FOOT PAIN: ICD-10-CM

## 2025-07-03 DIAGNOSIS — M10.9 GOUTY ARTHRITIS: ICD-10-CM

## 2025-07-03 DIAGNOSIS — E55.9 VITAMIN D DEFICIENCY: ICD-10-CM

## 2025-07-03 LAB
25(OH)D3 SERPL-MCNC: 33.7 NG/ML (ref 30–100)
ALBUMIN SERPL BCG-MCNC: 4 G/DL (ref 3.5–5)
ALP SERPL-CCNC: 98 U/L (ref 34–104)
ALT SERPL W P-5'-P-CCNC: 26 U/L (ref 7–52)
ANION GAP SERPL CALCULATED.3IONS-SCNC: 9 MMOL/L (ref 4–13)
AST SERPL W P-5'-P-CCNC: 23 U/L (ref 13–39)
BILIRUB SERPL-MCNC: 0.51 MG/DL (ref 0.2–1)
BILIRUB UR QL STRIP: NEGATIVE
BUN SERPL-MCNC: 12 MG/DL (ref 5–25)
CALCIUM SERPL-MCNC: 9.5 MG/DL (ref 8.4–10.2)
CHLORIDE SERPL-SCNC: 104 MMOL/L (ref 96–108)
CHOLEST SERPL-MCNC: 163 MG/DL (ref ?–200)
CK SERPL-CCNC: 155 U/L (ref 39–308)
CLARITY UR: CLEAR
CO2 SERPL-SCNC: 27 MMOL/L (ref 21–32)
COLOR UR: NORMAL
CREAT SERPL-MCNC: 0.8 MG/DL (ref 0.6–1.3)
CRP SERPL QL: 5 MG/L
ERYTHROCYTE [DISTWIDTH] IN BLOOD BY AUTOMATED COUNT: 11.9 % (ref 11.6–15.1)
ERYTHROCYTE [SEDIMENTATION RATE] IN BLOOD: 12 MM/HOUR (ref 0–19)
GFR SERPL CREATININE-BSD FRML MDRD: 101 ML/MIN/1.73SQ M
GLUCOSE P FAST SERPL-MCNC: 108 MG/DL (ref 65–99)
GLUCOSE UR STRIP-MCNC: NEGATIVE MG/DL
HCT VFR BLD AUTO: 43 % (ref 36.5–49.3)
HDLC SERPL-MCNC: 37 MG/DL
HGB BLD-MCNC: 14.3 G/DL (ref 12–17)
HGB UR QL STRIP.AUTO: NEGATIVE
KETONES UR STRIP-MCNC: NEGATIVE MG/DL
LDLC SERPL CALC-MCNC: 99 MG/DL (ref 0–100)
LEUKOCYTE ESTERASE UR QL STRIP: NEGATIVE
MCH RBC QN AUTO: 31.2 PG (ref 26.8–34.3)
MCHC RBC AUTO-ENTMCNC: 33.3 G/DL (ref 31.4–37.4)
MCV RBC AUTO: 94 FL (ref 82–98)
NITRITE UR QL STRIP: NEGATIVE
PH UR STRIP.AUTO: 7 [PH]
PLATELET # BLD AUTO: 416 THOUSANDS/UL (ref 149–390)
PMV BLD AUTO: 9.6 FL (ref 8.9–12.7)
POTASSIUM SERPL-SCNC: 3.9 MMOL/L (ref 3.5–5.3)
PROT SERPL-MCNC: 6.6 G/DL (ref 6.4–8.4)
PROT UR STRIP-MCNC: NEGATIVE MG/DL
PSA SERPL-MCNC: 2.05 NG/ML (ref 0–4)
RBC # BLD AUTO: 4.58 MILLION/UL (ref 3.88–5.62)
SODIUM SERPL-SCNC: 140 MMOL/L (ref 135–147)
SP GR UR STRIP.AUTO: 1.02 (ref 1–1.03)
TRIGL SERPL-MCNC: 133 MG/DL (ref ?–150)
TSH SERPL DL<=0.05 MIU/L-ACNC: 2.04 UIU/ML (ref 0.45–4.5)
UROBILINOGEN UR STRIP-ACNC: <2 MG/DL
WBC # BLD AUTO: 7.69 THOUSAND/UL (ref 4.31–10.16)

## 2025-07-03 PROCEDURE — 80061 LIPID PANEL: CPT

## 2025-07-03 PROCEDURE — 86140 C-REACTIVE PROTEIN: CPT

## 2025-07-03 PROCEDURE — 36415 COLL VENOUS BLD VENIPUNCTURE: CPT

## 2025-07-03 PROCEDURE — 81003 URINALYSIS AUTO W/O SCOPE: CPT

## 2025-07-03 PROCEDURE — G0103 PSA SCREENING: HCPCS

## 2025-07-03 PROCEDURE — 83516 IMMUNOASSAY NONANTIBODY: CPT

## 2025-07-03 PROCEDURE — 86340 INTRINSIC FACTOR ANTIBODY: CPT

## 2025-07-03 PROCEDURE — 85652 RBC SED RATE AUTOMATED: CPT

## 2025-07-03 PROCEDURE — 82550 ASSAY OF CK (CPK): CPT

## 2025-07-03 PROCEDURE — 80053 COMPREHEN METABOLIC PANEL: CPT

## 2025-07-03 PROCEDURE — 84443 ASSAY THYROID STIM HORMONE: CPT

## 2025-07-03 PROCEDURE — 82306 VITAMIN D 25 HYDROXY: CPT

## 2025-07-03 PROCEDURE — 85027 COMPLETE CBC AUTOMATED: CPT

## 2025-07-05 LAB — PCA AB SER-ACNC: 40.9 UNITS (ref 0–20)

## 2025-07-07 LAB — IF BLOCK AB SER QL RIA: 1.1 AU/ML (ref 0–1.1)

## 2025-07-08 ENCOUNTER — TELEPHONE (OUTPATIENT)
Age: 55
End: 2025-07-08

## 2025-07-08 ENCOUNTER — APPOINTMENT (OUTPATIENT)
Dept: LAB | Facility: CLINIC | Age: 55
End: 2025-07-08
Payer: COMMERCIAL

## 2025-07-08 DIAGNOSIS — K29.40 AUTOIMMUNE GASTRITIS: ICD-10-CM

## 2025-07-08 PROCEDURE — 82941 ASSAY OF GASTRIN: CPT

## 2025-07-08 PROCEDURE — 36415 COLL VENOUS BLD VENIPUNCTURE: CPT

## 2025-07-08 NOTE — TELEPHONE ENCOUNTER
Pt called to establish care. Pt requesting an appointment for 7/10/2025. Offered an appointment for 7/9/2025. Pt declined.

## 2025-07-09 ENCOUNTER — HOSPITAL ENCOUNTER (OUTPATIENT)
Dept: CT IMAGING | Facility: HOSPITAL | Age: 55
Discharge: HOME | End: 2025-07-09
Payer: COMMERCIAL

## 2025-07-09 DIAGNOSIS — M06.09 RHEUMATOID ARTHRITIS WITHOUT RHEUMATOID FACTOR, MULTIPLE SITES (HCC): ICD-10-CM

## 2025-07-09 PROCEDURE — 70490 CT SOFT TISSUE NECK W/O DYE: CPT

## 2025-07-10 ENCOUNTER — TELEPHONE (OUTPATIENT)
Age: 55
End: 2025-07-10

## 2025-07-10 DIAGNOSIS — D75.839 THROMBOCYTOSIS: Primary | ICD-10-CM

## 2025-07-10 NOTE — TELEPHONE ENCOUNTER
Patient is looking for a referral to Oncology based on his bloodwork. Please place referral in MyChart.

## 2025-07-11 LAB — GASTRIN SERPL-MCNC: 32 PG/ML (ref 0–115)

## 2025-07-15 ENCOUNTER — APPOINTMENT (OUTPATIENT)
Dept: LAB | Facility: CLINIC | Age: 55
End: 2025-07-15
Payer: COMMERCIAL

## 2025-07-15 DIAGNOSIS — E53.8 B12 DEFICIENCY: ICD-10-CM

## 2025-07-15 LAB — VIT B12 SERPL-MCNC: 385 PG/ML (ref 180–914)

## 2025-07-15 PROCEDURE — 36415 COLL VENOUS BLD VENIPUNCTURE: CPT

## 2025-07-15 PROCEDURE — 82607 VITAMIN B-12: CPT

## 2025-07-16 NOTE — RESULT NOTES
Hypertension is stable.  Patient currently only taking lisinopril 20 mg once a day  Continue current treatment regimen.  Continue current medications.  Ambulatory blood pressure monitoring.  Blood pressure will be reassessed at the next regular appointment.    Verified Results  * XR RIBS RIGHT W PA CHEST MIN 3 VIEWS [id number] 11:32AM Robby  Order Number: [id number]     Test Name Result Flag Reference   XR RIBS RIGHT W PA CHEST MIN 3 VIEWS (Report)     RIGHT RIBS AND CHEST - DUAL ENERGY     INDICATION: Right rib and back pain     COMPARISON: 2/11/2016 chest radiograph     VIEWS: PA chest (including soft tissue/bone algorithms) and 3 views right hemithorax     IMAGES: 6     FINDINGS:     The cardiomediastinal silhouette is unremarkable  Lungs are clear  No pleural effusions  There is no pneumothorax  No rib fractures are identified  IMPRESSION:     1  No active pulmonary disease  2  No evidence of rib fractures  Workstation performed: [id number]     Signed by:   Marco Antonio Betancourt MD   5/13/17     * XR SPINE THORACIC 3 VIEW [id number] 02:43PM Robby  Order Number: [id number]     Test Name Result Flag Reference   XR SPINE THORACIC 3 VW (Report)     THORACIC SPINE     INDICATION: Pain across upper thoracic spine for one month  COMPARISON: None     VIEWS: AP, lateral and coned-down projections     IMAGES: 4     FINDINGS:     Thoracic vertebrae demonstrate normal stature  A mild scoliosis is present convexity to the left  There is no fracture or pathologic bone lesion  There is no displacement of the paraspinal line  The pedicles are intact  Minimal spurring in the midthoracic region  IMPRESSION:     Mild scoliosis  Minimal spurring         Workstation performed: [id number]     Signed by:   Mario Ball MD   5/13/17

## 2025-07-16 NOTE — TELEPHONE ENCOUNTER
Why would patient like to see a hematologist.  Labs essentially normal platelets just minimally elevated.  This is the first time I am going to repeat platelet count in 2 weeks for patient please ascertain as if there is another reason patient would like to see hematology/oncology as at this juncture I do not see a need

## 2025-07-16 NOTE — TELEPHONE ENCOUNTER
Per pt his rheumatologist was suspecting of possible Pernicious anemia, but he already scheduled the appointment for tomorrow

## 2025-07-17 ENCOUNTER — TELEPHONE (OUTPATIENT)
Dept: HEMATOLOGY ONCOLOGY | Facility: CLINIC | Age: 55
End: 2025-07-17

## 2025-07-17 ENCOUNTER — OFFICE VISIT (OUTPATIENT)
Dept: HEMATOLOGY ONCOLOGY | Facility: CLINIC | Age: 55
End: 2025-07-17
Payer: COMMERCIAL

## 2025-07-17 VITALS
DIASTOLIC BLOOD PRESSURE: 76 MMHG | TEMPERATURE: 98.1 F | WEIGHT: 210 LBS | SYSTOLIC BLOOD PRESSURE: 114 MMHG | HEIGHT: 71 IN | OXYGEN SATURATION: 98 % | BODY MASS INDEX: 29.4 KG/M2 | HEART RATE: 79 BPM

## 2025-07-17 DIAGNOSIS — E53.8 B12 DEFICIENCY: ICD-10-CM

## 2025-07-17 DIAGNOSIS — D75.839 THROMBOCYTOSIS: Primary | ICD-10-CM

## 2025-07-17 PROCEDURE — 99204 OFFICE O/P NEW MOD 45 MIN: CPT

## 2025-07-17 RX ORDER — CYANOCOBALAMIN 1000 UG/ML
1000 INJECTION, SOLUTION INTRAMUSCULAR; SUBCUTANEOUS ONCE
Status: CANCELLED | OUTPATIENT
Start: 2025-07-17

## 2025-07-17 RX ORDER — CYANOCOBALAMIN 1000 UG/ML
1000 INJECTION, SOLUTION INTRAMUSCULAR; SUBCUTANEOUS ONCE
OUTPATIENT
Start: 2025-09-15

## 2025-07-17 RX ORDER — CYANOCOBALAMIN 1000 UG/ML
1000 INJECTION, SOLUTION INTRAMUSCULAR; SUBCUTANEOUS ONCE
Status: CANCELLED | OUTPATIENT
Start: 2025-07-24

## 2025-07-17 NOTE — ASSESSMENT & PLAN NOTE
Patient is a 54-year-old male self-referral with concern for pernicious anemia.  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  This may contribute to his B12 deficiency through poor absorption.  Patient is seen by rheumatology for seronegative inflammatory arthritis.  His rheumatologist was concerned that he may have autoimmune gastritis and pernicious anemia.  On review of chart patient is not anemic, hemoglobin on 7/3/2025 was 14.3, hematocrit 43, WBC 7.6, platelets 416.  Patient's intrinsic factor was 1.1.     On February 10, 2025 patient's B12 level was 180.  He states his neurologist ordered B12 injections 1000 mcg weekly x 4 then monthly x 5.  Repeat B12 on July 15, 2025 was 385, he has 1 monthly B12 injection still ordered.  As patient complains of numbness and tingling in his feet and hands and excessive fatigue I will reinitiate 1000 mcg weekly B12 injections x 4 and then monthly with goal to get B12 levels above 500.  Patient will return to office in 6 months with repeat B12 level at that time we will reevaluate need for continuation of therapy.    Plan-  - IM B12 1000 mcg weekly x 4 then monthly maintenance  -CBC and B12 level in 6 months  -Patient is scheduled for CT abdomen pelvis at the end of July, no hepatosplenomegaly noted on physical exam  - Return to clinic for history and physical and to review labs in January 2025  Orders:    CBC and differential; Future    Vitamin B12; Future

## 2025-07-17 NOTE — PROGRESS NOTES
Name: Jose Miguel Clayton      : 1970      MRN: [id number]  Encounter Provider: IHSAN Godfrey  Encounter Date: 2025   Encounter department: [facility]  :  Assessment & Plan  B12 deficiency  Patient is a 54-year-old male self-referral with concern for pernicious anemia.  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  This may contribute to his B12 deficiency through poor absorption.  Patient is seen by rheumatology for seronegative inflammatory arthritis.  His rheumatologist was concerned that he may have autoimmune gastritis and pernicious anemia.  On review of chart patient is not anemic, hemoglobin on 7/3/2025 was 14.3, hematocrit 43, WBC 7.6, platelets 416.  Patient's intrinsic factor was 1.1.     On February 10, 2025 patient's B12 level was 180.  He states his neurologist ordered B12 injections 1000 mcg weekly x 4 then monthly x 5.  Repeat B12 on July 15, 2025 was 385, he has 1 monthly B12 injection still ordered.  As patient complains of numbness and tingling in his feet and hands and excessive fatigue I will reinitiate 1000 mcg weekly B12 injections x 4 and then monthly with goal to get B12 levels above 500.  Patient will return to office in 6 months with repeat B12 level at that time we will reevaluate need for continuation of therapy.    Plan-  - IM B12 1000 mcg weekly x 4 then monthly maintenance  -CBC and B12 level in 6 months  -Patient is scheduled for CT abdomen pelvis at the end of July, no hepatosplenomegaly noted on physical exam  - Return to clinic for history and physical and to review labs in 2025  Orders:    CBC and differential; Future    Vitamin B12; Future    Thrombocytosis  This is likely reactive as patient has elevated inflammatory markers secondary to inflammatory arthritis/autoimmune disorder.  Baseline platelet level is mid to upper 400,000           No follow-ups on  file.    History of Present Illness   Chief Complaint   Patient presents with    Consult     Pertinent Medical History     07/17/25: Mr. Steiner is a 54-year-old male with a history of Brennan's esophagitis, nisin fundoplication, radiculopathy, gout, seronegative inflammatory arthritis, thrombocytosis, B12 deficiency.  Discussed he states he has a many year history of fatigue, numbness tingling, abdominal pain discomfort bloating, intermittent diarrhea that has worsened in the last 6 months.  He is followed by rheumatology and gastroenterology.  He states little improves his symptoms.  He has been receiving B12 injections since February through neurology, with the last injection being next month.  As he is still symptomatic with tingling and fatigue I will reinitiate B12 injections with goal of B12 level over 500.    Patient denies weight loss, night sweats, fevers, adenopathy (patient has had inflammatory cervical lymph nodes removed in the past without malignancy), or other B symptoms.  He denies cardiopulmonary symptoms, dermatologic or other neurologic symptoms.  Patient does follow with neurology.    Patient understands and agrees with the plan of care he will return in 6 months to reevaluate         Review of Systems   Constitutional:  Positive for fatigue. Negative for activity change, appetite change, chills, diaphoresis, fever and unexpected weight change.   Respiratory:  Negative for cough and shortness of breath.    Cardiovascular:  Negative for chest pain, palpitations and leg swelling.   Gastrointestinal:  Positive for abdominal pain and diarrhea. Negative for anal bleeding and blood in stool.        Abdominal fullness   Genitourinary:  Negative for hematuria.   Musculoskeletal:  Negative for arthralgias.   Skin:  Negative for color change, pallor and rash.   Hematological:  Negative for adenopathy. Does not bruise/bleed easily.   Psychiatric/Behavioral: Negative.     All other systems reviewed and are  negative.          Objective   There were no vitals taken for this visit.    Physical Exam    Labs: I have reviewed the following labs:  Results for orders placed or performed in visit on 07/15/25   Vitamin B12   Result Value Ref Range    Vitamin B-12 385 180 - 914 pg/mL     Lab Results   Component Value Date/Time    WBC 7.69 07/03/2025 07:53 AM    RBC 4.58 07/03/2025 07:53 AM    Hemoglobin 14.3 07/03/2025 07:53 AM    Hematocrit 43.0 07/03/2025 07:53 AM    MCV 94 07/03/2025 07:53 AM    MCH 31.2 07/03/2025 07:53 AM    RDW 11.9 07/03/2025 07:53 AM    Platelets 416 (H) 07/03/2025 07:53 AM    Segmented % 58 05/02/2025 01:12 PM    Lymphocytes % 26 05/02/2025 01:12 PM    Monocytes % 11 05/02/2025 01:12 PM    Eosinophils Relative 4 05/02/2025 01:12 PM    Basophils Relative 1 05/02/2025 01:12 PM    Immature Grans % 0 05/02/2025 01:12 PM    Absolute Neutrophils 4.31 05/02/2025 01:12 PM      Lab Results   Component Value Date/Time    Sodium 140 07/03/2025 07:53 AM    Potassium 3.9 07/03/2025 07:53 AM    Chloride 104 07/03/2025 07:53 AM    CO2 27 07/03/2025 07:53 AM    ANION GAP 9 07/03/2025 07:53 AM    BUN 12 07/03/2025 07:53 AM    Creatinine 0.80 07/03/2025 07:53 AM    Glucose 94 05/02/2025 01:12 PM    Glucose, Fasting 108 (H) 07/03/2025 07:53 AM    Calcium 9.5 07/03/2025 07:53 AM    AST 23 07/03/2025 07:53 AM    ALT 26 07/03/2025 07:53 AM    Alkaline Phosphatase 98 07/03/2025 07:53 AM    Total Protein 6.6 07/03/2025 07:53 AM    Albumin 4.0 07/03/2025 07:53 AM    Total Bilirubin 0.51 07/03/2025 07:53 AM    eGFR 101 07/03/2025 07:53 AM      Results for orders placed or performed in visit on 07/15/25   Vitamin B12   Result Value Ref Range    Vitamin B-12 385 180 - 914 pg/mL            Administrative Statements   I have spent a total time of 45 minutes in caring for this patient on the day of the visit/encounter including Diagnostic results, Prognosis, Risks and benefits of tx options, Patient and family education, Risk factor  reductions, Impressions, Counseling / Coordination of care, Documenting in the medical record, Reviewing/placing orders in the medical record (including tests, medications, and/or procedures), and Obtaining or reviewing history  .

## 2025-07-17 NOTE — ASSESSMENT & PLAN NOTE
This is likely reactive as patient has elevated inflammatory markers secondary to inflammatory arthritis/autoimmune disorder.  Baseline platelet level is mid to upper 400,000

## 2025-07-17 NOTE — TELEPHONE ENCOUNTER
Spoke with patient and went over scheduled dates and times. Patient is aware and understanding of his upcoming appointments

## 2025-07-23 ENCOUNTER — HOSPITAL ENCOUNTER (OUTPATIENT)
Dept: CT IMAGING | Facility: HOSPITAL | Age: 55
Discharge: HOME | End: 2025-07-23
Attending: INTERNAL MEDICINE

## 2025-07-23 DIAGNOSIS — R19.5 LOOSE STOOLS: ICD-10-CM

## 2025-07-23 DIAGNOSIS — E53.8 B12 DEFICIENCY: ICD-10-CM

## 2025-07-24 ENCOUNTER — HOSPITAL ENCOUNTER (OUTPATIENT)
Dept: INFUSION CENTER | Facility: CLINIC | Age: 55
Discharge: HOME | End: 2025-07-24
Attending: INTERNAL MEDICINE
Payer: COMMERCIAL

## 2025-07-24 DIAGNOSIS — E53.8 B12 DEFICIENCY: Primary | ICD-10-CM

## 2025-07-24 PROCEDURE — 96372 THER/PROPH/DIAG INJ SC/IM: CPT

## 2025-07-24 RX ORDER — CYANOCOBALAMIN 1000 UG/ML
1000 INJECTION, SOLUTION INTRAMUSCULAR; SUBCUTANEOUS ONCE
Status: COMPLETED | OUTPATIENT
Start: 2025-07-24 | End: 2025-07-24

## 2025-07-24 RX ORDER — CYANOCOBALAMIN 1000 UG/ML
1000 INJECTION, SOLUTION INTRAMUSCULAR; SUBCUTANEOUS ONCE
Status: CANCELLED | OUTPATIENT
Start: 2025-07-31

## 2025-07-24 RX ADMIN — CYANOCOBALAMIN 1000 MCG: 1000 INJECTION, SOLUTION INTRAMUSCULAR at 08:19

## 2025-07-28 ENCOUNTER — APPOINTMENT (OUTPATIENT)
Dept: LAB | Facility: CLINIC | Age: 55
End: 2025-07-28
Payer: COMMERCIAL

## 2025-07-28 ENCOUNTER — TRANSCRIBE ORDERS (OUTPATIENT)
Dept: LAB | Facility: CLINIC | Age: 55
End: 2025-07-28

## 2025-07-28 ENCOUNTER — HOSPITAL ENCOUNTER (OUTPATIENT)
Dept: CT IMAGING | Facility: HOSPITAL | Age: 55
Discharge: HOME | End: 2025-07-28
Attending: INTERNAL MEDICINE
Payer: COMMERCIAL

## 2025-07-28 DIAGNOSIS — R53.83 OTHER FATIGUE: ICD-10-CM

## 2025-07-28 DIAGNOSIS — D75.839 THROMBOCYTOSIS: ICD-10-CM

## 2025-07-28 DIAGNOSIS — K20.0 EOSINOPHILIC ESOPHAGITIS: ICD-10-CM

## 2025-07-28 DIAGNOSIS — D51.0 PERNICIOUS ANEMIA: Primary | ICD-10-CM

## 2025-07-28 DIAGNOSIS — D51.0 PERNICIOUS ANEMIA: ICD-10-CM

## 2025-07-28 LAB
FERRITIN SERPL-MCNC: 110 NG/ML (ref 30–336)
HCYS SERPL-SCNC: 11.9 UMOL/L (ref 5–15)
IRON SATN MFR SERPL: 45 % (ref 15–50)
IRON SERPL-MCNC: 143 UG/DL (ref 50–212)
PLATELET # BLD AUTO: 405 THOUSANDS/UL (ref 149–390)
PMV BLD AUTO: 9.5 FL (ref 8.9–12.7)
TIBC SERPL-MCNC: 315 UG/DL (ref 250–450)
TRANSFERRIN SERPL-MCNC: 225 MG/DL (ref 203–362)
UIBC SERPL-MCNC: 172 UG/DL (ref 155–355)

## 2025-07-28 PROCEDURE — 36415 COLL VENOUS BLD VENIPUNCTURE: CPT

## 2025-07-28 PROCEDURE — 83550 IRON BINDING TEST: CPT

## 2025-07-28 PROCEDURE — 74177 CT ABD & PELVIS W/CONTRAST: CPT

## 2025-07-28 PROCEDURE — 82728 ASSAY OF FERRITIN: CPT

## 2025-07-28 PROCEDURE — 83090 ASSAY OF HOMOCYSTEINE: CPT

## 2025-07-28 PROCEDURE — 83918 ORGANIC ACIDS TOTAL QUANT: CPT

## 2025-07-28 PROCEDURE — 82747 ASSAY OF FOLIC ACID RBC: CPT

## 2025-07-28 PROCEDURE — 85049 AUTOMATED PLATELET COUNT: CPT

## 2025-07-28 PROCEDURE — 83540 ASSAY OF IRON: CPT

## 2025-07-28 RX ADMIN — IOHEXOL 100 ML: 350 INJECTION, SOLUTION INTRAVENOUS at 19:25

## 2025-07-30 ENCOUNTER — APPOINTMENT (OUTPATIENT)
Dept: LAB | Facility: CLINIC | Age: 55
End: 2025-07-30
Payer: COMMERCIAL

## 2025-07-30 DIAGNOSIS — R19.5 LOOSE STOOLS: ICD-10-CM

## 2025-07-30 LAB — METHYLMALONATE SERPL-SCNC: 105 NMOL/L (ref 0–378)

## 2025-07-30 PROCEDURE — 83993 ASSAY FOR CALPROTECTIN FECAL: CPT

## 2025-07-30 PROCEDURE — 82653 EL-1 FECAL QUANTITATIVE: CPT

## 2025-07-31 ENCOUNTER — HOSPITAL ENCOUNTER (OUTPATIENT)
Dept: INFUSION CENTER | Facility: CLINIC | Age: 55
Discharge: HOME | End: 2025-07-31
Attending: INTERNAL MEDICINE
Payer: COMMERCIAL

## 2025-07-31 DIAGNOSIS — E53.8 B12 DEFICIENCY: Primary | ICD-10-CM

## 2025-07-31 LAB
FOLATE BLD-MCNC: 496 NG/ML
FOLATE RBC-MCNC: 1156 NG/ML
HCT VFR BLD AUTO: 42.9 % (ref 37.5–51)

## 2025-07-31 PROCEDURE — 96372 THER/PROPH/DIAG INJ SC/IM: CPT

## 2025-07-31 RX ORDER — CYANOCOBALAMIN 1000 UG/ML
1000 INJECTION, SOLUTION INTRAMUSCULAR; SUBCUTANEOUS ONCE
Status: CANCELLED | OUTPATIENT
Start: 2025-08-07

## 2025-07-31 RX ORDER — CYANOCOBALAMIN 1000 UG/ML
1000 INJECTION, SOLUTION INTRAMUSCULAR; SUBCUTANEOUS ONCE
Status: COMPLETED | OUTPATIENT
Start: 2025-07-31 | End: 2025-07-31

## 2025-07-31 RX ADMIN — CYANOCOBALAMIN 1000 MCG: 1000 INJECTION, SOLUTION INTRAMUSCULAR at 08:09

## 2025-08-01 LAB
CALPROTECTIN STL-MCNC: 16.1 ÂΜG/G
ELASTASE PANC STL-MCNT: 546 UG/G

## 2025-08-05 ENCOUNTER — TELEPHONE (OUTPATIENT)
Age: 55
End: 2025-08-05

## 2025-08-07 ENCOUNTER — HOSPITAL ENCOUNTER (OUTPATIENT)
Dept: INFUSION CENTER | Facility: CLINIC | Age: 55
Discharge: HOME | End: 2025-08-07
Attending: INTERNAL MEDICINE
Payer: COMMERCIAL

## 2025-08-08 ENCOUNTER — CLINICAL SUPPORT (OUTPATIENT)
Dept: NEUROLOGY | Facility: CLINIC | Age: 55
End: 2025-08-08
Payer: COMMERCIAL

## 2025-08-08 DIAGNOSIS — E53.8 B12 DEFICIENCY: Primary | ICD-10-CM

## 2025-08-08 PROCEDURE — 96372 THER/PROPH/DIAG INJ SC/IM: CPT | Performed by: PSYCHIATRY & NEUROLOGY

## 2025-08-08 RX ADMIN — CYANOCOBALAMIN 1000 MCG: 1000 INJECTION, SOLUTION INTRAMUSCULAR; SUBCUTANEOUS at 08:32

## 2025-08-14 ENCOUNTER — HOSPITAL ENCOUNTER (OUTPATIENT)
Dept: INFUSION CENTER | Facility: CLINIC | Age: 55
End: 2025-08-14
Attending: INTERNAL MEDICINE

## 2025-08-19 ENCOUNTER — HOSPITAL ENCOUNTER (OUTPATIENT)
Dept: INFUSION CENTER | Facility: CLINIC | Age: 55
Discharge: HOME | End: 2025-08-19
Attending: INTERNAL MEDICINE
Payer: COMMERCIAL

## 2025-08-19 DIAGNOSIS — E53.8 B12 DEFICIENCY: Primary | ICD-10-CM

## 2025-08-19 PROCEDURE — 96372 THER/PROPH/DIAG INJ SC/IM: CPT

## 2025-08-19 RX ORDER — CYANOCOBALAMIN 1000 UG/ML
1000 INJECTION, SOLUTION INTRAMUSCULAR; SUBCUTANEOUS ONCE
Status: CANCELLED | OUTPATIENT
Start: 2025-08-21

## 2025-08-19 RX ORDER — CYANOCOBALAMIN 1000 UG/ML
1000 INJECTION, SOLUTION INTRAMUSCULAR; SUBCUTANEOUS ONCE
Status: COMPLETED | OUTPATIENT
Start: 2025-08-19 | End: 2025-08-19

## 2025-08-19 RX ADMIN — CYANOCOBALAMIN 1000 MCG: 1000 INJECTION, SOLUTION INTRAMUSCULAR at 08:26

## 2025-08-20 ENCOUNTER — OFFICE VISIT (OUTPATIENT)
Dept: GASTROENTEROLOGY | Facility: CLINIC | Age: 55
End: 2025-08-20
Payer: COMMERCIAL

## 2025-08-20 ENCOUNTER — TELEPHONE (OUTPATIENT)
Dept: NEUROLOGY | Facility: CLINIC | Age: 55
End: 2025-08-20

## 2025-08-20 VITALS
TEMPERATURE: 98.4 F | BODY MASS INDEX: 29.79 KG/M2 | SYSTOLIC BLOOD PRESSURE: 124 MMHG | DIASTOLIC BLOOD PRESSURE: 72 MMHG | WEIGHT: 213.6 LBS

## 2025-08-20 DIAGNOSIS — R13.19 ESOPHAGEAL DYSPHAGIA: ICD-10-CM

## 2025-08-20 DIAGNOSIS — R09.A2 GLOBUS SENSATION: ICD-10-CM

## 2025-08-20 DIAGNOSIS — K21.9 GASTROESOPHAGEAL REFLUX DISEASE, UNSPECIFIED WHETHER ESOPHAGITIS PRESENT: ICD-10-CM

## 2025-08-20 DIAGNOSIS — E53.8 B12 DEFICIENCY: ICD-10-CM

## 2025-08-20 DIAGNOSIS — Z12.11 SCREENING FOR COLON CANCER: ICD-10-CM

## 2025-08-20 DIAGNOSIS — K22.70 BARRETT'S ESOPHAGUS WITHOUT DYSPLASIA: Primary | ICD-10-CM

## 2025-08-20 DIAGNOSIS — M35.2: ICD-10-CM

## 2025-08-20 DIAGNOSIS — R11.0 NAUSEA: ICD-10-CM

## 2025-08-20 PROCEDURE — 99205 OFFICE O/P NEW HI 60 MIN: CPT | Performed by: INTERNAL MEDICINE

## 2025-08-20 RX ORDER — SODIUM CHLORIDE, SODIUM LACTATE, POTASSIUM CHLORIDE, CALCIUM CHLORIDE 600; 310; 30; 20 MG/100ML; MG/100ML; MG/100ML; MG/100ML
125 INJECTION, SOLUTION INTRAVENOUS CONTINUOUS
OUTPATIENT
Start: 2025-08-20

## 2025-08-22 ENCOUNTER — TELEPHONE (OUTPATIENT)
Dept: GASTROENTEROLOGY | Facility: HOSPITAL | Age: 55
End: 2025-08-22